# Patient Record
Sex: FEMALE | Race: WHITE | NOT HISPANIC OR LATINO | Employment: OTHER | URBAN - METROPOLITAN AREA
[De-identification: names, ages, dates, MRNs, and addresses within clinical notes are randomized per-mention and may not be internally consistent; named-entity substitution may affect disease eponyms.]

---

## 2017-03-01 ENCOUNTER — HOSPITAL ENCOUNTER (OUTPATIENT)
Dept: LAB | Facility: MEDICAL CENTER | Age: 32
End: 2017-03-01
Attending: OBSTETRICS & GYNECOLOGY
Payer: MEDICARE

## 2017-03-01 LAB — B-HCG SERPL-ACNC: 804.9 MIU/ML (ref 0–5)

## 2017-03-01 PROCEDURE — 36415 COLL VENOUS BLD VENIPUNCTURE: CPT | Mod: GA

## 2017-03-01 PROCEDURE — 84702 CHORIONIC GONADOTROPIN TEST: CPT | Mod: GA

## 2017-05-04 ENCOUNTER — OFFICE VISIT (OUTPATIENT)
Dept: URGENT CARE | Facility: CLINIC | Age: 32
End: 2017-05-04
Payer: MEDICARE

## 2017-05-04 VITALS
DIASTOLIC BLOOD PRESSURE: 72 MMHG | OXYGEN SATURATION: 98 % | HEIGHT: 65 IN | HEART RATE: 66 BPM | BODY MASS INDEX: 28.82 KG/M2 | RESPIRATION RATE: 16 BRPM | TEMPERATURE: 99.1 F | SYSTOLIC BLOOD PRESSURE: 114 MMHG | WEIGHT: 173 LBS

## 2017-05-04 DIAGNOSIS — Z3A.15 PREGNANCY WITH 15 COMPLETED WEEKS GESTATION: ICD-10-CM

## 2017-05-04 DIAGNOSIS — J45.41 MODERATE PERSISTENT ASTHMA WITH ACUTE EXACERBATION: ICD-10-CM

## 2017-05-04 DIAGNOSIS — J30.0 VASOMOTOR RHINITIS: ICD-10-CM

## 2017-05-04 DIAGNOSIS — J30.89 NON-SEASONAL ALLERGIC RHINITIS, UNSPECIFIED ALLERGIC RHINITIS TRIGGER: ICD-10-CM

## 2017-05-04 PROCEDURE — G8419 CALC BMI OUT NRM PARAM NOF/U: HCPCS | Performed by: EMERGENCY MEDICINE

## 2017-05-04 PROCEDURE — 1036F TOBACCO NON-USER: CPT | Performed by: EMERGENCY MEDICINE

## 2017-05-04 PROCEDURE — 99203 OFFICE O/P NEW LOW 30 MIN: CPT | Performed by: EMERGENCY MEDICINE

## 2017-05-04 ASSESSMENT — ENCOUNTER SYMPTOMS
SORE THROAT: 0
RHINORRHEA: 1
WHEEZING: 1
EYE REDNESS: 0
HEARTBURN: 0
VOMITING: 0
ABDOMINAL PAIN: 0
DIARRHEA: 0
SHORTNESS OF BREATH: 1
PALPITATIONS: 0
COUGH: 1
HEMOPTYSIS: 0
MYALGIAS: 0
SPUTUM PRODUCTION: 0
FEVER: 0
EYE DISCHARGE: 0
CHILLS: 0
HEADACHES: 0
NAUSEA: 0

## 2017-05-04 NOTE — MR AVS SNAPSHOT
"        Annamaria Medrano Ramoneinnaz   2017 4:30 PM   Office Visit   MRN: [id number]    Department:  [facility]   Dept Phone:  [phone]    Description:  Female : 1985   Provider:  Chi Palacios M.D.           Reason for Visit     Cough x 3 weeks having cough and chest congestion, nasal congestion and drainage, some ear popping, headache, wheezing, hx of asthma, 15 weeks pregnant       Allergies as of 2017     Allergen Noted Reactions    Imuran [Azathioprine Sodium] 2012   Anaphylaxis    Liver enzymes rise significantly    Sulfasalazine 2012   Hives    Plaquenil [Hydroxychloroquine Sulfate] 2012   Hives    Vicodin [Hydrocodone-Acetaminophen] 2012   Itching    Other Drug 2013   Hives    \"Iv contrast\"    Other Misc 10/05/2007       tape      You were diagnosed with     Moderate persistent asthma with acute exacerbation   [0114439]       Non-seasonal allergic rhinitis, unspecified allergic rhinitis trigger   [8906432]       Vasomotor rhinitis   [538116]       Pregnancy with 15 completed weeks gestation   [4374280]         Vital Signs     Blood Pressure Pulse Temperature Respirations Height Weight    114/72 mmHg 66 37.3 °C (99.1 °F) 16 1.651 m (5' 5\") 78.472 kg (173 lb)    Body Mass Index Oxygen Saturation Last Menstrual Period Breastfeeding? 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   2012 - Present    Dermatographic urticaria L50.3   2013 - Present    Migraine headache G43.909   2013 - Present    H/O thyroidectomy E89.0   2013 - Present    History of strabismus Z86.69   " 11/14/2013 - Present    S/P cholecystectomy Z90.49   11/14/2013 - Present    S/P inguinal hernia repair Z98.890, Z87.19   11/14/2013 - Present    Ankle fracture S82.899A   11/14/2013 - Present    History of back surgery Z98.890   11/14/2013 - Present    Mast cell disease Q82.2   11/19/2014 - Present    Mild intermittent asthma J45.20   9/28/2015 - Present    Postoperative hypothyroidism E89.0   1/15/2016 - Present    Folliculitis L73.9   1/15/2016 - Present    Candida vaginitis B37.3   1/15/2016 - Present    Acute on chronic SI joint pain M53.3, G89.29   2/3/2016 - Present    Lumbar radiculopathy M54.16   9/19/2016 - Present      Health Maintenance        Date Due Completion Dates    IMM PNEUMOCOCCAL 19-64 (ADULT) MEDIUM RISK SERIES (1 of 1 - PPSV23) 11/14/2016 9/19/2016    PAP SMEAR 12/17/2018 12/17/2015    IMM DTaP/Tdap/Td Vaccine (2 - Td) 9/19/2026 9/19/2016            Current Immunizations     13-VALENT PCV PREVNAR 9/19/2016 10:20 AM    Influenza Vaccine Quad Inj (Preserved) 1/15/2016    Tdap Vaccine 9/19/2016 10:20 AM      Below and/or attached are the medications your provider expects you to take. Review all of your home medications and newly ordered medications with your provider and/or pharmacist. Follow medication instructions as directed by your provider and/or pharmacist. Please keep your medication list with you and share with your provider. Update the information when medications are discontinued, doses are changed, or new medications (including over-the-counter products) are added; and carry medication information at all times in the event of emergency situations     Allergies:  IMURAN - Anaphylaxis     SULFASALAZINE - Hives     PLAQUENIL - Hives     VICODIN - Itching     OTHER DRUG - Hives     OTHER MISC - (reactions not documented)               Medications  Valid as of: May 04, 2017 -  5:41 PM    Generic Name Brand Name Tablet Size Instructions for use    Acyclovir (Ointment) ZOVIRAX 5 % APPLY TO  COLD SORE 4 TIMES DAILY        Albuterol Sulfate (Aero Soln) albuterol 108 (90 BASE) MCG/ACT Inhale 2 Puffs by mouth every four hours as needed for Shortness of Breath.        Albuterol Sulfate (Aero Soln) albuterol 108 (90 BASE) MCG/ACT Inhale 2 Puffs by mouth every 6 hours as needed for Shortness of Breath.        Budesonide (AEROSOL POWDER, BREATH ACTIVATED) Budesonide (Inhalation) 180 MCG/ACT Inhale 1 Puff by mouth 2 Times a Day.        Cetirizine HCl (Tab) ZYRTEC 10 MG TAKE 2 TABLETS BY MOUTH DAILY        Diclofenac Sodium (Gel) Diclofenac Sodium 1 % Apply 4 gm to effected areas qid prn.        Fluconazole (Tab) DIFLUCAN 100 MG Take 1 Tab by mouth every day. TAKE 1 TAB BY MOUTH EVERY DAY.        HydrOXYzine HCl (Tab) ATARAX 10 MG Take 1 Tab by mouth 3 times a day as needed for Itching.        Ibuprofen (Tab) MOTRIN 600 MG Take 1 Tab by mouth every 8 hours as needed for Moderate Pain or Inflammation.        Levothyroxine Sodium (Tab) SYNTHROID 112 MCG TAKE 2 TABLETS BY MOUTH EVERY MORNING ON AN EMPTY STOMACH.        Metaxalone (Tab) SKELAXIN 800 MG Take 1 Tab by mouth 3 times a day.        Montelukast Sodium (Tab) SINGULAIR 10 MG TAKE 1 TAB BY MOUTH EVERY DAY.        Ondansetron HCl (Tab) ZOFRAN 4 MG Take 1 Tab by mouth every 8 hours as needed for Nausea/Vomiting.        RaNITidine HCl (Tab) ZANTAC 150 MG Take 1 Tab by mouth 2 times a day.        SUMAtriptan Succinate   Take  by mouth.        .                 Medicines prescribed today were sent to:     [id number] - [place] - [address]    [address] [place] [address]    Phone: [phone] Fax: [phone]    Open 24 Hours?: No      Medication refill instructions:       If your prescription bottle indicates you have medication refills left, it is not necessary to call your provider’s office. Please contact your pharmacy and they will refill your medication.    If your prescription bottle indicates you do not have any refills left, you may  request refills at any time through one of the following ways: The online [employer] system (except Urgent Care), by calling your provider’s office, or by asking your pharmacy to contact your provider’s office with a refill request. Medication refills are processed only during regular business hours and may not be available until the next business day. Your provider may request additional information or to have a follow-up visit with you prior to refilling your medication.   *Please Note: Medication refills are assigned a new Rx number when refilled electronically. Your pharmacy may indicate that no refills were authorized even though a new prescription for the same medication is available at the pharmacy. Please request the medicine by name with the pharmacy before contacting your provider for a refill.        Instructions    Use saline nasal irrigation, such as with a Neti Pot, as needed daily for relief of nasal or sinus congestion relief.  Use over-the-counter inhaled nasal steroid (Rhinocort AQ) daily; continue for at least 2-3 weeks    Allergic Rhinitis  Allergic rhinitis is when the mucous membranes in the nose respond to allergens. Allergens are particles in the air that cause your body to have an allergic reaction. This causes you to release allergic antibodies. Through a chain of events, these eventually cause you to release histamine into the blood stream. Although meant to protect the body, it is this release of histamine that causes your discomfort, such as frequent sneezing, congestion, and an itchy, runny nose.   CAUSES  Seasonal allergic rhinitis (hay fever) is caused by pollen allergens that may come from grasses, trees, and weeds. Year-round allergic rhinitis (perennial allergic rhinitis) is caused by allergens such as house dust mites, pet dander, and mold spores.  SYMPTOMS  · Nasal stuffiness (congestion).  · Itchy, runny nose with sneezing and tearing of the eyes.  DIAGNOSIS  Your health care  provider can help you determine the allergen or allergens that trigger your symptoms. If you and your health care provider are unable to determine the allergen, skin or blood testing may be used. Your health care provider will diagnose your condition after taking your health history and performing a physical exam. Your health care provider may assess you for other related conditions, such as asthma, pink eye, or an ear infection.  TREATMENT  Allergic rhinitis does not have a cure, but it can be controlled by:  · Medicines that block allergy symptoms. These may include allergy shots, nasal sprays, and oral antihistamines.  · Avoiding the allergen.  Hay fever may often be treated with antihistamines in pill or nasal spray forms. Antihistamines block the effects of histamine. There are over-the-counter medicines that may help with nasal congestion and swelling around the eyes. Check with your health care provider before taking or giving this medicine.  If avoiding the allergen or the medicine prescribed do not work, there are many new medicines your health care provider can prescribe. Stronger medicine may be used if initial measures are ineffective. Desensitizing injections can be used if medicine and avoidance does not work. Desensitization is when a patient is given ongoing shots until the body becomes less sensitive to the allergen. Make sure you follow up with your health care provider if problems continue.  HOME CARE INSTRUCTIONS  It is not possible to completely avoid allergens, but you can reduce your symptoms by taking steps to limit your exposure to them. It helps to know exactly what you are allergic to so that you can avoid your specific triggers.  SEEK MEDICAL CARE IF:  · You have a fever.  · You develop a cough that does not stop easily (persistent).  · You have shortness of breath.  · You start wheezing.  · Symptoms interfere with normal daily activities.     This information is not intended to replace  advice given to you by your health care provider. Make sure you discuss any questions you have with your health care provider.     Document Released: 09/12/2002 Document Revised: 01/08/2016 Document Reviewed: 08/25/2014  [employer] Interactive Patient Education ©2016 [employer]  Bronchospasm, Adult  A bronchospasm is when the tubes that carry air in and out of your lungs (airways) spasm or tighten. During a bronchospasm it is hard to breathe. This is because the airways get smaller. A bronchospasm can be triggered by:  · Allergies. These may be to animals, pollen, food, or mold.  · Infection. This is a common cause of bronchospasm.  · Exercise.  · Irritants. These include pollution, cigarette smoke, strong odors, aerosol sprays, and paint fumes.  · Weather changes.  · Stress.  · Being emotional.  HOME CARE   · Always have a plan for getting help. Know when to call your doctor and local emergency services (911 in the [place] Know where you can get emergency care.  · Only take medicines as told by your doctor.  · If you were prescribed an inhaler or nebulizer machine, ask your doctor how to use it correctly. Always use a spacer with your inhaler if you were given one.  · Stay calm during an attack. Try to relax and breathe more slowly.  · Control your home environment:  · Change your heating and air conditioning filter at least once a month.  · Limit your use of fireplaces and wood stoves.  · Do not  smoke. Do not  allow smoking in your home.  · Avoid perfumes and fragrances.  · Get rid of pests (such as roaches and mice) and their droppings.  · Throw away plants if you see mold on them.  · Keep your house clean and dust free.  · Replace carpet with wood, tile, or vinyl shazia. Carpet can trap dander and dust.  · Use allergy-proof pillows, mattress covers, and box spring covers.  · Wash bed sheets and blankets every week in hot water. Dry them in a dryer.  · Use blankets that are made of polyester or cotton.  · Wash  hands frequently.  GET HELP IF:  · You have muscle aches.  · You have chest pain.  · The thick spit you spit or cough up (sputum) changes from clear or white to yellow, green, gray, or bloody.  · The thick spit you spit or cough up gets thicker.  · There are problems that may be related to the medicine you are given such as:  · A rash.  · Itching.  · Swelling.  · Trouble breathing.  GET HELP RIGHT AWAY IF:  · You feel you cannot breathe or catch your breath.  · You cannot stop coughing.  · Your treatment is not helping you breathe better.  · You have very bad chest pain.  MAKE SURE YOU:   · Understand these instructions.  · Will watch your condition.  · Will get help right away if you are not doing well or get worse.     This information is not intended to replace advice given to you by your health care provider. Make sure you discuss any questions you have with your health care provider.     Document Released: 10/15/2010 Document Revised: 01/08/2016 Document Reviewed: 06/10/2014  [employer] Interactive Patient Education ©2016 [employer]  .       Other Notes About Your Plan     Dr Champagne for Allergy  Dr Titus-STEVIE Jarrett-rheumatology           MyChart Access Code: Activation code not generated  Current [employer] Status: Active

## 2017-05-05 NOTE — PROGRESS NOTES
Subjective:      Annamaria Babin is a 32 y.o. female who presents with Cough            Cough  This is a new problem. Episode onset: 3 weeks. The problem has been waxing and waning. The problem occurs every few minutes. The cough is non-productive. Associated symptoms include chest pain, nasal congestion, postnasal drip, rhinorrhea, shortness of breath and wheezing. Pertinent negatives include no chills, ear congestion, ear pain, eye redness, fever, headaches, heartburn, hemoptysis, myalgias or sore throat. She has tried a beta-agonist inhaler for the symptoms. The treatment provided mild relief. Her past medical history is significant for asthma and environmental allergies.     3 and Ab2, 15 weeks on, located pregnancy. Patient notes prior vasomotor issues associated with pregnancy. Onset of illness associated with coryzal symptoms and fever, fever resolved but cough and congestion persist. Obstetrician suggested riding it out.    Review of Systems   Constitutional: Positive for malaise/fatigue. Negative for fever and chills.   HENT: Positive for congestion, postnasal drip and rhinorrhea. Negative for ear discharge, ear pain, hearing loss, nosebleeds and sore throat.    Eyes: Negative for discharge and redness.   Respiratory: Positive for cough, shortness of breath and wheezing. Negative for hemoptysis and sputum production.    Cardiovascular: Positive for chest pain. Negative for palpitations and leg swelling.        Diffuse chest pain associated with coughing only   Gastrointestinal: Negative for heartburn, nausea, vomiting, abdominal pain and diarrhea.   Genitourinary:        No vaginal discharge or bleeding.   Musculoskeletal: Negative for myalgias.   Neurological: Negative for headaches.   Endo/Heme/Allergies: Positive for environmental allergies.     PMH:  has a past medical history of Hashimoto disease; Anemia; Renal disorder; and Liver disease.  MEDS:   Current outpatient prescriptions:   •   Budesonide, Inhalation, 180 MCG/ACT AEROSOL POWDER, BREATH ACTIVATED, Inhale 1 Puff by mouth 2 Times a Day., Disp: 1 Each, Rfl: 0  •  levothyroxine (SYNTHROID) 112 MCG Tab, TAKE 2 TABLETS BY MOUTH EVERY MORNING ON AN EMPTY STOMACH., Disp: 180 Tab, Rfl: 3  •  albuterol 108 (90 BASE) MCG/ACT Aero Soln inhalation aerosol, Inhale 2 Puffs by mouth every 6 hours as needed for Shortness of Breath., Disp: 8.5 g, Rfl: 3  •  montelukast (SINGULAIR) 10 MG Tab, TAKE 1 TAB BY MOUTH EVERY DAY., Disp: 60 Tab, Rfl: 6  •  cetirizine (ZYRTEC) 10 MG Tab, TAKE 2 TABLETS BY MOUTH DAILY, Disp: 90 Tab, Rfl: 3  •  acyclovir (ZOVIRAX) 5 % Ointment, APPLY TO COLD SORE 4 TIMES DAILY, Disp: 30 g, Rfl: 0  •  ranitidine (ZANTAC) 150 MG Tab, Take 1 Tab by mouth 2 times a day., Disp: 60 Tab, Rfl: 11  •  albuterol (VENTOLIN OR PROVENTIL) 108 (90 BASE) MCG/ACT Aero Soln inhalation aerosol, Inhale 2 Puffs by mouth every four hours as needed for Shortness of Breath., Disp: 1 Inhaler, Rfl: 3  •  ondansetron (ZOFRAN) 4 MG Tab tablet, Take 1 Tab by mouth every 8 hours as needed for Nausea/Vomiting., Disp: 20 Tab, Rfl: 1  •  SUMAtriptan Succinate (IMITREX PO), Take  by mouth., Disp: , Rfl:   •  ibuprofen (MOTRIN) 600 MG Tab, Take 1 Tab by mouth every 8 hours as needed for Moderate Pain or Inflammation., Disp: 30 Tab, Rfl: 3  •  metaxalone (SKELAXIN) 800 MG Tab, Take 1 Tab by mouth 3 times a day., Disp: 90 Tab, Rfl: 3  •  hydrOXYzine (ATARAX) 10 MG Tab, Take 1 Tab by mouth 3 times a day as needed for Itching., Disp: 90 Tab, Rfl: 1  •  fluconazole (DIFLUCAN) 100 MG Tab, Take 1 Tab by mouth every day. TAKE 1 TAB BY MOUTH EVERY DAY., Disp: 5 Tab, Rfl: 1  •  Diclofenac Sodium (VOLTAREN) 1 % GEL, Apply 4 gm to effected areas qid prn., Disp: 3 Tube, Rfl: 3  ALLERGIES:   Allergies   Allergen Reactions   • Imuran [Azathioprine Sodium] Anaphylaxis     Liver enzymes rise significantly   • Sulfasalazine Hives   • Plaquenil [Hydroxychloroquine Sulfate] Hives   •  "Vicodin [Hydrocodone-Acetaminophen] Itching   • Other Drug Hives     \"Iv contrast\"   • Other Misc      tape     SURGHX:   Past Surgical History   Procedure Laterality Date   • Thyroidectomy     • Tonsillectomy     • Strabismus repair     • Ankle orif     • Inguinal hernia repair     • Other abdominal surgery       gall bladder     SOCHX:  reports that she has never smoked. She has never used smokeless tobacco. She reports that she does not drink alcohol or use illicit drugs.  FH: family history includes Diabetes in her father and mother; GI in her mother; Heart Disease in her father; Heart Failure in her father; Hypertension in her father and mother.       Objective:     /72 mmHg  Pulse 66  Temp(Src) 37.3 °C (99.1 °F)  Resp 16  Ht 1.651 m (5' 5\")  Wt 78.472 kg (173 lb)  BMI 28.79 kg/m2  SpO2 98%  LMP 02/01/2017  Breastfeeding? No     Physical Exam   Constitutional: She is oriented to person, place, and time. She appears well-developed and well-nourished. She is cooperative. She does not have a sickly appearance. She does not appear ill. No distress.   HENT:   Head: Normocephalic.   Right Ear: Ear canal normal. Tympanic membrane is not erythematous.   Left Ear: Ear canal normal. Tympanic membrane is not erythematous.   Nose: Mucosal edema and rhinorrhea present. Right sinus exhibits maxillary sinus tenderness. Left sinus exhibits maxillary sinus tenderness.   Mouth/Throat: Oropharyngeal exudate present. No posterior oropharyngeal edema or posterior oropharyngeal erythema.   Eyes: Conjunctivae are normal.   Neck: Trachea normal and phonation normal. Neck supple. No JVD present.   Cardiovascular: Normal rate, regular rhythm and normal heart sounds.    No significant pedal edema.   Pulmonary/Chest: No accessory muscle usage. No tachypnea. No respiratory distress. She has no decreased breath sounds. She has wheezes. She has rhonchi. She has no rales.   Rhonchi clear with cough.   Abdominal: She exhibits no " distension.   Lymphadenopathy:     She has no cervical adenopathy.   Neurological: She is alert and oriented to person, place, and time.   Skin: Skin is warm and dry.   Psychiatric: She has a normal mood and affect.          Risk of persistent asthma symptoms appears greater than the risk of having inhaled steroids during second trimester pregnancy     Assessment/Plan:     1. Moderate persistent asthma with acute exacerbation  Advised used own metered-dose inhaler with spacer, own nebulizer with albuterol as needed.  Advised F/U PCP as soon as available.  - Budesonide, Inhalation, 180 MCG/ACT AEROSOL POWDER, BREATH ACTIVATED; Inhale 1 Puff by mouth 2 Times a Day.  Dispense: 1 Each; Refill: 0  2. Non-seasonal allergic rhinitis, unspecified allergic rhinitis trigger  Recommended nasal saline irrigation daily, OTC inhaled Rhinocort nasal steroid daily, discontinue OTC nonsedating antihistamine.  3. Vasomotor rhinitis    4. Pregnancy with 15 completed weeks gestation

## 2017-05-05 NOTE — PATIENT INSTRUCTIONS
Use saline nasal irrigation, such as with a Neti Pot, as needed daily for relief of nasal or sinus congestion relief.  Use over-the-counter inhaled nasal steroid (Rhinocort AQ) daily; continue for at least 2-3 weeks    Allergic Rhinitis  Allergic rhinitis is when the mucous membranes in the nose respond to allergens. Allergens are particles in the air that cause your body to have an allergic reaction. This causes you to release allergic antibodies. Through a chain of events, these eventually cause you to release histamine into the blood stream. Although meant to protect the body, it is this release of histamine that causes your discomfort, such as frequent sneezing, congestion, and an itchy, runny nose.   CAUSES  Seasonal allergic rhinitis (hay fever) is caused by pollen allergens that may come from grasses, trees, and weeds. Year-round allergic rhinitis (perennial allergic rhinitis) is caused by allergens such as house dust mites, pet dander, and mold spores.  SYMPTOMS  · Nasal stuffiness (congestion).  · Itchy, runny nose with sneezing and tearing of the eyes.  DIAGNOSIS  Your health care provider can help you determine the allergen or allergens that trigger your symptoms. If you and your health care provider are unable to determine the allergen, skin or blood testing may be used. Your health care provider will diagnose your condition after taking your health history and performing a physical exam. Your health care provider may assess you for other related conditions, such as asthma, pink eye, or an ear infection.  TREATMENT  Allergic rhinitis does not have a cure, but it can be controlled by:  · Medicines that block allergy symptoms. These may include allergy shots, nasal sprays, and oral antihistamines.  · Avoiding the allergen.  Hay fever may often be treated with antihistamines in pill or nasal spray forms. Antihistamines block the effects of histamine. There are over-the-counter medicines that may help with  nasal congestion and swelling around the eyes. Check with your health care provider before taking or giving this medicine.  If avoiding the allergen or the medicine prescribed do not work, there are many new medicines your health care provider can prescribe. Stronger medicine may be used if initial measures are ineffective. Desensitizing injections can be used if medicine and avoidance does not work. Desensitization is when a patient is given ongoing shots until the body becomes less sensitive to the allergen. Make sure you follow up with your health care provider if problems continue.  HOME CARE INSTRUCTIONS  It is not possible to completely avoid allergens, but you can reduce your symptoms by taking steps to limit your exposure to them. It helps to know exactly what you are allergic to so that you can avoid your specific triggers.  SEEK MEDICAL CARE IF:  · You have a fever.  · You develop a cough that does not stop easily (persistent).  · You have shortness of breath.  · You start wheezing.  · Symptoms interfere with normal daily activities.     This information is not intended to replace advice given to you by your health care provider. Make sure you discuss any questions you have with your health care provider.     Document Released: 09/12/2002 Document Revised: 01/08/2016 Document Reviewed: 08/25/2014  [employer] Interactive Patient Education ©2016 [employer]  Bronchospasm, Adult  A bronchospasm is when the tubes that carry air in and out of your lungs (airways) spasm or tighten. During a bronchospasm it is hard to breathe. This is because the airways get smaller. A bronchospasm can be triggered by:  · Allergies. These may be to animals, pollen, food, or mold.  · Infection. This is a common cause of bronchospasm.  · Exercise.  · Irritants. These include pollution, cigarette smoke, strong odors, aerosol sprays, and paint fumes.  · Weather changes.  · Stress.  · Being emotional.  HOME CARE   · Always have a plan  for getting help. Know when to call your doctor and local emergency services (911 in the [place] Know where you can get emergency care.  · Only take medicines as told by your doctor.  · If you were prescribed an inhaler or nebulizer machine, ask your doctor how to use it correctly. Always use a spacer with your inhaler if you were given one.  · Stay calm during an attack. Try to relax and breathe more slowly.  · Control your home environment:  · Change your heating and air conditioning filter at least once a month.  · Limit your use of fireplaces and wood stoves.  · Do not  smoke. Do not  allow smoking in your home.  · Avoid perfumes and fragrances.  · Get rid of pests (such as roaches and mice) and their droppings.  · Throw away plants if you see mold on them.  · Keep your house clean and dust free.  · Replace carpet with wood, tile, or vinyl shazia. Carpet can trap dander and dust.  · Use allergy-proof pillows, mattress covers, and box spring covers.  · Wash bed sheets and blankets every week in hot water. Dry them in a dryer.  · Use blankets that are made of polyester or cotton.  · Wash hands frequently.  GET HELP IF:  · You have muscle aches.  · You have chest pain.  · The thick spit you spit or cough up (sputum) changes from clear or white to yellow, green, gray, or bloody.  · The thick spit you spit or cough up gets thicker.  · There are problems that may be related to the medicine you are given such as:  · A rash.  · Itching.  · Swelling.  · Trouble breathing.  GET HELP RIGHT AWAY IF:  · You feel you cannot breathe or catch your breath.  · You cannot stop coughing.  · Your treatment is not helping you breathe better.  · You have very bad chest pain.  MAKE SURE YOU:   · Understand these instructions.  · Will watch your condition.  · Will get help right away if you are not doing well or get worse.     This information is not intended to replace advice given to you by your health care provider. Make sure you  discuss any questions you have with your health care provider.     Document Released: 10/15/2010 Document Revised: 01/08/2016 Document Reviewed: 06/10/2014  [employer] Interactive Patient Education ©2016 [employer]  .

## 2017-05-08 ENCOUNTER — HOSPITAL ENCOUNTER (OUTPATIENT)
Dept: LAB | Facility: MEDICAL CENTER | Age: 32
End: 2017-05-08
Attending: OBSTETRICS & GYNECOLOGY
Payer: MEDICARE

## 2017-05-08 ENCOUNTER — HOSPITAL ENCOUNTER (OUTPATIENT)
Facility: MEDICAL CENTER | Age: 32
End: 2017-05-08
Attending: OBSTETRICS & GYNECOLOGY
Payer: MEDICARE

## 2017-05-08 LAB — AMBIGUOUS DTTM AMBI4: NORMAL

## 2017-05-08 PROCEDURE — 81507 FETAL ANEUPLOIDY TRISOM RISK: CPT

## 2017-05-12 ENCOUNTER — HOSPITAL ENCOUNTER (OUTPATIENT)
Dept: LAB | Facility: MEDICAL CENTER | Age: 32
End: 2017-05-12
Attending: OBSTETRICS & GYNECOLOGY
Payer: MEDICARE

## 2017-05-12 LAB
ALBUMIN SERPL BCP-MCNC: 4.2 G/DL (ref 3.2–4.9)
ALBUMIN/GLOB SERPL: 1.3 G/DL
ALP SERPL-CCNC: 82 U/L (ref 30–99)
ALT SERPL-CCNC: 10 U/L (ref 2–50)
ANION GAP SERPL CALC-SCNC: 9 MMOL/L (ref 0–11.9)
AST SERPL-CCNC: 17 U/L (ref 12–45)
BILIRUB SERPL-MCNC: 0.6 MG/DL (ref 0.1–1.5)
BUN SERPL-MCNC: 10 MG/DL (ref 8–22)
CALCIUM SERPL-MCNC: 9.2 MG/DL (ref 8.5–10.5)
CHLORIDE SERPL-SCNC: 105 MMOL/L (ref 96–112)
CO2 SERPL-SCNC: 23 MMOL/L (ref 20–33)
COLLECT DURATION TIME UR: 24 HR
CREAT CL/1.73 SQ M ?TM UR+SERPL-ARVRAT: 209 ML/MIN (ref 88–128)
CREAT SERPL-MCNC: 0.61 MG/DL (ref 0.5–1.4)
CREAT SERPL-MCNC: 0.63 MG/DL (ref 0.5–1.4)
CREAT UR-MCNC: 65.3 MG/DL
GFR SERPL CREATININE-BSD FRML MDRD: >60 ML/MIN/1.73 M 2
GLOBULIN SER CALC-MCNC: 3.2 G/DL (ref 1.9–3.5)
GLUCOSE SERPL-MCNC: 77 MG/DL (ref 65–99)
POTASSIUM SERPL-SCNC: 4.1 MMOL/L (ref 3.6–5.5)
PROT 24H UR-MCNC: NORMAL MG/24 HR (ref 30–150)
PROT 24H UR-MRATE: <4 MG/DL (ref 0–15)
PROT SERPL-MCNC: 7.4 G/DL (ref 6–8.2)
SODIUM SERPL-SCNC: 137 MMOL/L (ref 135–145)
SPECIMEN VOL UR: 3000 ML
SPECIMEN VOL UR: 3000 ML
T4 FREE SERPL-MCNC: 0.76 NG/DL (ref 0.53–1.43)
TSH SERPL DL<=0.005 MIU/L-ACNC: 4.99 UIU/ML (ref 0.3–3.7)
URINE CREATININE EXCRETED 1125: 1959 MG/24 HR

## 2017-05-12 PROCEDURE — 80053 COMPREHEN METABOLIC PANEL: CPT

## 2017-05-12 PROCEDURE — 36415 COLL VENOUS BLD VENIPUNCTURE: CPT

## 2017-05-12 PROCEDURE — 84439 ASSAY OF FREE THYROXINE: CPT

## 2017-05-12 PROCEDURE — 81050 URINALYSIS VOLUME MEASURE: CPT

## 2017-05-12 PROCEDURE — 84443 ASSAY THYROID STIM HORMONE: CPT

## 2017-05-12 PROCEDURE — 84156 ASSAY OF PROTEIN URINE: CPT

## 2017-05-12 PROCEDURE — 82575 CREATININE CLEARANCE TEST: CPT

## 2017-05-15 LAB
ANNOTATION COMMENT IMP: NORMAL
FET CHR X + Y ANEUP RISK PLAS.CFDNA QN: NORMAL
FET SEX US: NORMAL
FET TS 13 RISK PLAS.CFDNA QL: NORMAL
FET TS 18 RISK PLAS.CFDNA QL: NORMAL
FET TS 21 RISK PLAS.CFDNA QL: NORMAL
FETAL FRACTION Q0204: 8.9
GA (DAYS): 5 D
GA (WEEKS): 12 WK
PATHOLOGY STUDY: NORMAL
SERVICE CMNT-IMP: YES
TRIPLOIDY VAN TWIN Q0202: NORMAL

## 2017-05-26 DIAGNOSIS — D47.09 MAST CELL DISEASE: ICD-10-CM

## 2017-05-26 DIAGNOSIS — L50.3 DERMATOGRAPHIC URTICARIA: ICD-10-CM

## 2017-05-26 DIAGNOSIS — J45.20 MILD INTERMITTENT ASTHMA WITHOUT COMPLICATION: ICD-10-CM

## 2017-05-26 RX ORDER — CETIRIZINE HYDROCHLORIDE 10 MG/1
TABLET ORAL
Qty: 90 TAB | Refills: 12 | Status: SHIPPED | OUTPATIENT
Start: 2017-05-26 | End: 2018-10-01 | Stop reason: SDUPTHER

## 2017-05-26 RX ORDER — RANITIDINE 150 MG/1
TABLET ORAL
Qty: 60 TAB | Refills: 12 | Status: SHIPPED | OUTPATIENT
Start: 2017-05-26 | End: 2018-10-01 | Stop reason: SDUPTHER

## 2017-05-26 RX ORDER — MONTELUKAST SODIUM 10 MG/1
TABLET ORAL
Qty: 60 TAB | Refills: 12 | Status: SHIPPED | OUTPATIENT
Start: 2017-05-26 | End: 2018-10-01 | Stop reason: SDUPTHER

## 2017-06-19 DIAGNOSIS — D47.09 MAST CELL DISEASE: ICD-10-CM

## 2017-06-19 RX ORDER — CROMOLYN SODIUM 100 MG/5ML
200 SOLUTION, CONCENTRATE ORAL
Qty: 10 ML | Refills: 0 | OUTPATIENT
Start: 2017-06-19

## 2017-06-19 NOTE — TELEPHONE ENCOUNTER
1. Caller Name: Annamaria Babin                                         Call Back Number: [phone] (home)       Patient approves a detailed voicemail message: N\A    Patient informed.

## 2017-06-19 NOTE — TELEPHONE ENCOUNTER
Was the patient seen in the last year in this department? No lv 04/19/16    Does patient have an active prescription for medications requested? No     Received Request Via: Patient

## 2017-06-19 NOTE — TELEPHONE ENCOUNTER
Refill one time. Patient received Cromolyn from Allergy specialist. Please ask patient and pharmacy to send refill request to allergy specialist.    Margret Titus M.D.

## 2017-06-26 ENCOUNTER — HOSPITAL ENCOUNTER (OUTPATIENT)
Dept: LAB | Facility: MEDICAL CENTER | Age: 32
End: 2017-06-26
Attending: OBSTETRICS & GYNECOLOGY
Payer: MEDICARE

## 2017-06-26 LAB
T4 FREE SERPL-MCNC: 0.68 NG/DL (ref 0.53–1.43)
TSH SERPL DL<=0.005 MIU/L-ACNC: 5.35 UIU/ML (ref 0.3–3.7)

## 2017-06-26 PROCEDURE — 82105 ALPHA-FETOPROTEIN SERUM: CPT

## 2017-06-26 PROCEDURE — 36415 COLL VENOUS BLD VENIPUNCTURE: CPT

## 2017-06-26 PROCEDURE — 84439 ASSAY OF FREE THYROXINE: CPT

## 2017-06-26 PROCEDURE — 84443 ASSAY THYROID STIM HORMONE: CPT

## 2017-07-03 LAB
# FETUSES US: NORMAL
AFP MOM SERPL: 0.98
AFP SERPL-MCNC: 54 NG/ML
AGE - REPORTED: 33.7 YR
GA METHOD: NORMAL
GA: 20.71 WEEKS
IDDM PATIENT QL: NO
INTEGRATED SCN PATIENT-IMP: NORMAL

## 2017-08-01 ENCOUNTER — HOSPITAL ENCOUNTER (OUTPATIENT)
Facility: MEDICAL CENTER | Age: 32
End: 2017-08-01
Attending: CLINICAL NURSE SPECIALIST
Payer: MEDICARE

## 2017-08-01 PROCEDURE — 87591 N.GONORRHOEAE DNA AMP PROB: CPT

## 2017-08-01 PROCEDURE — 87491 CHLMYD TRACH DNA AMP PROBE: CPT

## 2017-08-02 LAB — AMBIGUOUS DTTM AMBI4: NORMAL

## 2017-08-03 ENCOUNTER — HOSPITAL ENCOUNTER (OUTPATIENT)
Dept: LAB | Facility: MEDICAL CENTER | Age: 32
End: 2017-08-03
Attending: CLINICAL NURSE SPECIALIST
Payer: MEDICARE

## 2017-08-03 LAB
C TRACH DNA SPEC QL NAA+PROBE: NEGATIVE
ERYTHROCYTE [DISTWIDTH] IN BLOOD BY AUTOMATED COUNT: 49.4 FL (ref 35.9–50)
GLUCOSE 1H P 50 G GLC PO SERPL-MCNC: 115 MG/DL (ref 70–139)
HBV SURFACE AG SER QL: NEGATIVE
HCT VFR BLD AUTO: 44.1 % (ref 37–47)
HCV AB SER QL: NEGATIVE
HGB BLD-MCNC: 14.4 G/DL (ref 12–16)
HIV 1+2 AB+HIV1 P24 AG SERPL QL IA: NON REACTIVE
MCH RBC QN AUTO: 32.9 PG (ref 27–33)
MCHC RBC AUTO-ENTMCNC: 32.7 G/DL (ref 33.6–35)
MCV RBC AUTO: 100.7 FL (ref 81.4–97.8)
N GONORRHOEA DNA SPEC QL NAA+PROBE: NEGATIVE
PLATELET # BLD AUTO: 179 K/UL (ref 164–446)
PMV BLD AUTO: 11.7 FL (ref 9–12.9)
RBC # BLD AUTO: 4.38 M/UL (ref 4.2–5.4)
SPEC CONTAINER SPEC: NORMAL
SPECIMEN SOURCE: NORMAL
TREPONEMA PALLIDUM IGG+IGM AB [PRESENCE] IN SERUM OR PLASMA BY IMMUNOASSAY: NON REACTIVE
WBC # BLD AUTO: 10.1 K/UL (ref 4.8–10.8)

## 2017-08-03 PROCEDURE — 86780 TREPONEMA PALLIDUM: CPT

## 2017-08-03 PROCEDURE — 85027 COMPLETE CBC AUTOMATED: CPT

## 2017-08-03 PROCEDURE — 86803 HEPATITIS C AB TEST: CPT

## 2017-08-03 PROCEDURE — 87340 HEPATITIS B SURFACE AG IA: CPT

## 2017-08-03 PROCEDURE — 36415 COLL VENOUS BLD VENIPUNCTURE: CPT

## 2017-08-03 PROCEDURE — 87389 HIV-1 AG W/HIV-1&-2 AB AG IA: CPT

## 2017-08-03 PROCEDURE — 82950 GLUCOSE TEST: CPT

## 2017-08-21 ENCOUNTER — HOSPITAL ENCOUNTER (OUTPATIENT)
Facility: MEDICAL CENTER | Age: 32
End: 2017-08-21
Attending: OBSTETRICS & GYNECOLOGY | Admitting: OBSTETRICS & GYNECOLOGY
Payer: MEDICARE

## 2017-08-21 VITALS
TEMPERATURE: 97.8 F | HEART RATE: 69 BPM | SYSTOLIC BLOOD PRESSURE: 116 MMHG | RESPIRATION RATE: 18 BRPM | HEIGHT: 65 IN | BODY MASS INDEX: 33.32 KG/M2 | WEIGHT: 200 LBS | DIASTOLIC BLOOD PRESSURE: 70 MMHG

## 2017-08-21 LAB
A1 MICROGLOB PLACENTAL VAG QL: NEGATIVE
FIBRONECTIN FETAL SPEC QL: POSITIVE

## 2017-08-21 PROCEDURE — 59025 FETAL NON-STRESS TEST: CPT | Performed by: OBSTETRICS & GYNECOLOGY

## 2017-08-21 PROCEDURE — 84112 EVAL AMNIOTIC FLUID PROTEIN: CPT

## 2017-08-21 PROCEDURE — 82731 ASSAY OF FETAL FIBRONECTIN: CPT

## 2017-08-21 ASSESSMENT — PAIN SCALES - GENERAL
PAINLEVEL_OUTOF10: 0
PAINLEVEL_OUTOF10: 0

## 2017-08-22 NOTE — CONSULTS
DATE OF SERVICE:  2017    HISTORY OF PRESENT ILLNESS:  This is a 32-year-old  3, para 2 who is at   28 weeks and 5 days.  She reports loss of fluid at approximately 1900 with   small amount of dribble.  Following that, she denies any contractions or   vaginal bleeding.  She reports that the baby had decreased movement this   morning, but this has subsequently resolved.  Fetal surveillance shows   reactive tracing for gestational age.  Her Tocometer shows no contractions.    On sterile speculum exam, there was no fluid noted.  The AmniSure was   negative.  Fetal fibronectin was collected was positive; however, on review,   the patient has had intercourse within the last 24 hours.  Based on overall   clinical exam, patient does not appear to be ruptured and has reassuring fetal   surveillance for gestational age.  She will be discharged home with   precautions and will follow up with Dr. Mora as previously scheduled.       ____________________________________     MD CHASITY SINGLETON / MAGEN    DD:  2017 23:11:38  DT:  2017 00:11:53    D#:  [id number]  Job#:  [id number]

## 2017-08-22 NOTE — PROGRESS NOTES
"- 32 yr old, , EDC , per LMP, 28.5wks.  Pt arrived to L&D for LOF at 1900.  Pt states, \"I was standing outside and I have fluid leak down my legs.\"  \"I laid down for a while and then when I got up I had a couple more dripples.\"  Pt denies VB or UC's.  Pt reports decreased FM, starting this morning. Pt currently reports +FM, with visual movement and palpable movement of baby in abdomen.  [place] and US on, VS/S.  - Dr. Vigil updated on pt's status. FFN and amnisure obtained by sterile speculum exam, no fluid present upon exam.  - Amnisure results-neg. FFN results +, pt questioned about sexual activity and pt did have sex this morning.  Dr. Vigil updated on test results.  -  Working at the bedside, all questions answered. Pt to follow-up with Dr. Mora at scheduled appt. Pt discharged home with written instructions.  - pt ambulated out of hospital with FOB.  "

## 2017-09-19 ENCOUNTER — HOSPITAL ENCOUNTER (OUTPATIENT)
Facility: MEDICAL CENTER | Age: 32
End: 2017-09-19
Attending: OBSTETRICS & GYNECOLOGY | Admitting: OBSTETRICS & GYNECOLOGY
Payer: MEDICARE

## 2017-09-19 VITALS — TEMPERATURE: 97.2 F

## 2017-09-19 LAB
APPEARANCE UR: ABNORMAL
COLOR UR AUTO: YELLOW
GLUCOSE UR QL STRIP.AUTO: NEGATIVE MG/DL
KETONES UR QL STRIP.AUTO: NEGATIVE MG/DL
LEUKOCYTE ESTERASE UR QL STRIP.AUTO: NEGATIVE
NITRITE UR QL STRIP.AUTO: NEGATIVE
PH UR STRIP.AUTO: 5.5 [PH]
PROT UR QL STRIP: NEGATIVE MG/DL
RBC UR QL AUTO: ABNORMAL
SP GR UR: 1.01

## 2017-09-19 PROCEDURE — 59025 FETAL NON-STRESS TEST: CPT | Performed by: OBSTETRICS & GYNECOLOGY

## 2017-09-19 PROCEDURE — 81002 URINALYSIS NONAUTO W/O SCOPE: CPT

## 2017-09-20 PROCEDURE — 81050 URINALYSIS VOLUME MEASURE: CPT

## 2017-09-20 PROCEDURE — 84156 ASSAY OF PROTEIN URINE: CPT

## 2017-09-20 PROCEDURE — 36415 COLL VENOUS BLD VENIPUNCTURE: CPT

## 2017-09-20 PROCEDURE — 82575 CREATININE CLEARANCE TEST: CPT

## 2017-09-20 NOTE — PROGRESS NOTES
with EDC of  making her 32.6 presents for irregular UCs since last noc; denies VB or LOF; reports good FM. Pt had intercourse yesterday. Urine dipped, WNL. Pt reports no UCs while she's been here. Report given to Dr. Arita, orders to check cervix and d/c home. SVE closed. PTL precautions provided; educated about the importance of doing kick counts

## 2017-09-21 ENCOUNTER — HOSPITAL ENCOUNTER (OUTPATIENT)
Dept: LAB | Facility: MEDICAL CENTER | Age: 32
End: 2017-09-21
Attending: OBSTETRICS & GYNECOLOGY
Payer: MEDICARE

## 2017-09-21 LAB
ALBUMIN SERPL BCP-MCNC: 3.4 G/DL (ref 3.2–4.9)
ALBUMIN/GLOB SERPL: 1.1 G/DL
ALP SERPL-CCNC: 154 U/L (ref 30–99)
ALT SERPL-CCNC: 18 U/L (ref 2–50)
ANION GAP SERPL CALC-SCNC: 8 MMOL/L (ref 0–11.9)
AST SERPL-CCNC: 18 U/L (ref 12–45)
BASOPHILS # BLD AUTO: 0.5 % (ref 0–1.8)
BASOPHILS # BLD: 0.05 K/UL (ref 0–0.12)
BILIRUB SERPL-MCNC: 0.4 MG/DL (ref 0.1–1.5)
BUN SERPL-MCNC: 10 MG/DL (ref 8–22)
CALCIUM SERPL-MCNC: 8.3 MG/DL (ref 8.5–10.5)
CHLORIDE SERPL-SCNC: 105 MMOL/L (ref 96–112)
CO2 SERPL-SCNC: 23 MMOL/L (ref 20–33)
CREAT SERPL-MCNC: 0.46 MG/DL (ref 0.5–1.4)
EOSINOPHIL # BLD AUTO: 0.13 K/UL (ref 0–0.51)
EOSINOPHIL NFR BLD: 1.2 % (ref 0–6.9)
ERYTHROCYTE [DISTWIDTH] IN BLOOD BY AUTOMATED COUNT: 44.9 FL (ref 35.9–50)
GFR SERPL CREATININE-BSD FRML MDRD: >60 ML/MIN/1.73 M 2
GLOBULIN SER CALC-MCNC: 3.2 G/DL (ref 1.9–3.5)
GLUCOSE SERPL-MCNC: 100 MG/DL (ref 65–99)
HCT VFR BLD AUTO: 40.7 % (ref 37–47)
HGB BLD-MCNC: 13.8 G/DL (ref 12–16)
IMM GRANULOCYTES # BLD AUTO: 0.19 K/UL (ref 0–0.11)
IMM GRANULOCYTES NFR BLD AUTO: 1.8 % (ref 0–0.9)
LYMPHOCYTES # BLD AUTO: 1.53 K/UL (ref 1–4.8)
LYMPHOCYTES NFR BLD: 14.7 % (ref 22–41)
MCH RBC QN AUTO: 32.4 PG (ref 27–33)
MCHC RBC AUTO-ENTMCNC: 33.9 G/DL (ref 33.6–35)
MCV RBC AUTO: 95.5 FL (ref 81.4–97.8)
MONOCYTES # BLD AUTO: 0.75 K/UL (ref 0–0.85)
MONOCYTES NFR BLD AUTO: 7.2 % (ref 0–13.4)
NEUTROPHILS # BLD AUTO: 7.78 K/UL (ref 2–7.15)
NEUTROPHILS NFR BLD: 74.6 % (ref 44–72)
NRBC # BLD AUTO: 0 K/UL
NRBC BLD AUTO-RTO: 0 /100 WBC
PLATELET # BLD AUTO: 189 K/UL (ref 164–446)
PMV BLD AUTO: 11.5 FL (ref 9–12.9)
POTASSIUM SERPL-SCNC: 3.9 MMOL/L (ref 3.6–5.5)
PROT 24H UR-MCNC: 169 MG/24 HR (ref 30–150)
PROT 24H UR-MRATE: 6.2 MG/DL (ref 0–15)
PROT SERPL-MCNC: 6.6 G/DL (ref 6–8.2)
RBC # BLD AUTO: 4.26 M/UL (ref 4.2–5.4)
SODIUM SERPL-SCNC: 136 MMOL/L (ref 135–145)
SPECIMEN VOL UR: 2725 ML
T4 FREE SERPL-MCNC: 0.57 NG/DL (ref 0.53–1.43)
TSH SERPL DL<=0.005 MIU/L-ACNC: 6.59 UIU/ML (ref 0.3–3.7)
WBC # BLD AUTO: 10.4 K/UL (ref 4.8–10.8)

## 2017-09-21 PROCEDURE — 36415 COLL VENOUS BLD VENIPUNCTURE: CPT

## 2017-09-21 PROCEDURE — 80053 COMPREHEN METABOLIC PANEL: CPT

## 2017-09-21 PROCEDURE — 84443 ASSAY THYROID STIM HORMONE: CPT

## 2017-09-21 PROCEDURE — 85025 COMPLETE CBC W/AUTO DIFF WBC: CPT

## 2017-09-21 PROCEDURE — 84439 ASSAY OF FREE THYROXINE: CPT

## 2017-09-21 PROCEDURE — 84156 ASSAY OF PROTEIN URINE: CPT

## 2017-09-21 PROCEDURE — 82575 CREATININE CLEARANCE TEST: CPT

## 2017-09-21 PROCEDURE — 81050 URINALYSIS VOLUME MEASURE: CPT

## 2017-09-22 LAB
COLLECT DURATION TIME UR: 24 HR
CREAT CL/1.73 SQ M ?TM UR+SERPL-ARVRAT: 259 ML/MIN (ref 88–128)
CREAT SERPL-MCNC: 0.52 MG/DL (ref 0.5–1.4)
CREAT UR-MCNC: 80.7 MG/DL
SPECIMEN VOL UR: 2725 ML
URINE CREATININE EXCRETED 1125: 2199 MG/24 HR

## 2017-09-28 ENCOUNTER — APPOINTMENT (OUTPATIENT)
Dept: RADIOLOGY | Facility: MEDICAL CENTER | Age: 32
End: 2017-09-28
Attending: OBSTETRICS & GYNECOLOGY
Payer: MEDICARE

## 2017-09-28 ENCOUNTER — HOSPITAL ENCOUNTER (OUTPATIENT)
Facility: MEDICAL CENTER | Age: 32
End: 2017-09-28
Attending: OBSTETRICS & GYNECOLOGY | Admitting: OBSTETRICS & GYNECOLOGY
Payer: MEDICARE

## 2017-09-28 VITALS
HEART RATE: 82 BPM | BODY MASS INDEX: 36.49 KG/M2 | RESPIRATION RATE: 18 BRPM | DIASTOLIC BLOOD PRESSURE: 76 MMHG | SYSTOLIC BLOOD PRESSURE: 128 MMHG | HEIGHT: 65 IN | WEIGHT: 219 LBS | TEMPERATURE: 97.8 F

## 2017-09-28 LAB
ANION GAP SERPL CALC-SCNC: 12 MMOL/L (ref 0–11.9)
APPEARANCE UR: CLEAR
BILIRUB UR QL STRIP.AUTO: NEGATIVE
BUN SERPL-MCNC: 8 MG/DL (ref 8–22)
CALCIUM SERPL-MCNC: 8.3 MG/DL (ref 8.5–10.5)
CHLORIDE SERPL-SCNC: 105 MMOL/L (ref 96–112)
CO2 SERPL-SCNC: 19 MMOL/L (ref 20–33)
COLOR UR: YELLOW
CREAT SERPL-MCNC: 0.51 MG/DL (ref 0.5–1.4)
GFR SERPL CREATININE-BSD FRML MDRD: >60 ML/MIN/1.73 M 2
GLUCOSE SERPL-MCNC: 102 MG/DL (ref 65–99)
GLUCOSE UR STRIP.AUTO-MCNC: NEGATIVE MG/DL
KETONES UR STRIP.AUTO-MCNC: NEGATIVE MG/DL
LEUKOCYTE ESTERASE UR QL STRIP.AUTO: NEGATIVE
MICRO URNS: NORMAL
NITRITE UR QL STRIP.AUTO: NEGATIVE
PH UR STRIP.AUTO: 5 [PH]
POTASSIUM SERPL-SCNC: 3.5 MMOL/L (ref 3.6–5.5)
PROT UR QL STRIP: NEGATIVE MG/DL
RBC UR QL AUTO: NEGATIVE
SODIUM SERPL-SCNC: 136 MMOL/L (ref 135–145)
SP GR UR STRIP.AUTO: 1.01
UROBILINOGEN UR STRIP.AUTO-MCNC: 0.2 MG/DL

## 2017-09-28 PROCEDURE — 80048 BASIC METABOLIC PNL TOTAL CA: CPT

## 2017-09-28 PROCEDURE — 700111 HCHG RX REV CODE 636 W/ 250 OVERRIDE (IP): Performed by: OBSTETRICS & GYNECOLOGY

## 2017-09-28 PROCEDURE — 76775 US EXAM ABDO BACK WALL LIM: CPT

## 2017-09-28 PROCEDURE — 81003 URINALYSIS AUTO W/O SCOPE: CPT

## 2017-09-28 PROCEDURE — 96374 THER/PROPH/DIAG INJ IV PUSH: CPT

## 2017-09-28 PROCEDURE — 59025 FETAL NON-STRESS TEST: CPT | Performed by: OBSTETRICS & GYNECOLOGY

## 2017-09-28 PROCEDURE — 36415 COLL VENOUS BLD VENIPUNCTURE: CPT

## 2017-09-28 RX ORDER — BETAMETHASONE SODIUM PHOSPHATE AND BETAMETHASONE ACETATE 3; 3 MG/ML; MG/ML
12 INJECTION, SUSPENSION INTRA-ARTICULAR; INTRALESIONAL; INTRAMUSCULAR; SOFT TISSUE EVERY 24 HOURS
Status: DISCONTINUED | OUTPATIENT
Start: 2017-09-28 | End: 2017-09-28 | Stop reason: HOSPADM

## 2017-09-28 RX ADMIN — BETAMETHASONE SODIUM PHOSPHATE AND BETAMETHASONE ACETATE 12 MG: 3; 3 INJECTION, SUSPENSION INTRA-ARTICULAR; INTRALESIONAL; INTRAMUSCULAR at 04:37

## 2017-09-28 NOTE — PROGRESS NOTES
G  P  EDC of 10-30 35.3 weeks according to DR Patricia or 11-8 34.1 weeks according to Dr Tan pt is here with C/O constant pain on her right kidney. Dr Way was notified orders received 0220 pt is now straining her urine. US is here for renal US. BMP was drawn.  0300 it was noted that pt is having UCs Q 6 min SVE done 3-5 min 10% very posterior and very high. Can not  feel presenting part.  0400 pt is still feeling pain on her right side, just irritability noted. Labs ae WNL.  Renal US is all normal. Dr Way was called report given and order received for betamethasone.  betamethasone first dose given at 0437.  0545 DR Way in spoke to pt, physical exam done and SVE done extrnal oz 3-4 inner oz 2 cm, long and high.  0600 order received to discharge pt home . Pt does not need to come back for second dose.  0605 instructions given and pt was discharged home with  at 0610.

## 2017-09-28 NOTE — CONSULTS
DATE OF SERVICE:  2017    CHIEF COMPLAINT:  Right lower posterior chest wall pain at 35 weeks'   gestation.    HISTORY OF PRESENT ILLNESS:  The patient is a 32-year-old lady,  3,   para 2 with 2 previous uncomplicated term deliveries.  She states that she   sees Dr. Alesia Krishnamurthy for prenatal care.  We do not have her prenatal   records.  We do have some prenatal records from Dr. Mora's office from   earlier in the pregnancy and the patient states that she switched doctors   after that.  It sounds like her STACEY was changed to 10/30/2017 by first   trimester ultrasound at the perinatologist's office, which would make her EGA   35-3/7 weeks.    The patient came here today because of gradually worsening pain in the area of   her right lower posterior ribs for 3 days, which eventually became   unbearable.  She has been on labor and delivery for several hours now and   states that the pain is greatly improved.  She denies having any shortness of   breath or cough.  She denies any trauma or unusual activities.    The patient states that she has a diagnosis of Alport's syndrome, a disorder   of the glomerular basement membrane.  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  There is an extensive family history of renal failure   secondary to this condition in multiple males in her family.    Fetal monitoring is reassuring, there have been sporadic contractions, but no   regular uterine activity.  Before I arrived, there was a question of advanced   cervical dilatation, so I did order a dose of betamethasone;  she did receive   12 mg betamethasone as ordered before IU arrived..  I just checked her cervix myself, it is very high, very   posterior, very difficult to reach the internal os, but I think her internal   os is approximately 2 cm dilated.  I am fairly certain the baby presents   cephalically, the head  "is ballotable.  In view of this, I do not think it is   necessary for her to return tomorrow for second betamethasone injection.    PAST MEDICAL HISTORY:  Positive for \"Alport's syndrome\" diagnosed with renal   biopsy several years ago.  She also describes history of mastocytosis with   splenomegaly, Hashimoto's thyroiditis with goiter, eventually resulting in   thyroidectomy.    PAST SURGICAL HISTORY:  Extensive including total thyroidectomy,   tonsillectomy, hernia repair, cholecystectomy, ankle and back surgery.    MEDICATIONS:  Prenatal vitamin daily.    ALLERGIES:  She has to avoid many medicines because of her mastocytosis   including OPIOIDS, SULFA, AND TAPE ADHESIVE.    FAMILY HISTORY:  She states that her father, paternal grandfather and brother   are also  affected by Alport's syndrome, highly suspicious for the   X-linked variant.  Her father  from both renal failure and deafness.    PHYSICAL EXAMINATION:  VITAL SIGNS:  Afebrile, /76.  GENERAL:  At this time, she is in minimal distress.  She states that the right   posterior lower chest wall pain has markedly improved without any specific   treatment.  The intercostal muscles in this area are mildly tender.  There is   no CVA tenderness.  Uterus is soft and nontender.  PELVIC:  Above.  Cervix very posterior high, uneffaced, internal os   approximately 2 cm dilated.  Baby's head is ballotable.  EXTREMITIES:  No edema.  Homans negative.  DTRs normal.    LABORATORY DATA:  Hemoglobin 1 week ago was 13.8, hematocrit 40.7, white blood   count 10,400, platelet count 189,000.  Today, her BUN is 8, creatinine 0.5,   potassium 3.5, sodium 136.  Urinalysis is completely unremarkable.  There is   no proteinuria, negative for nitrites and leukocyte esterase, negative for   occult blood.  Renal ultrasound is within normal limits.  Kidneys are normal   size.  There is no evidence of hydronephrosis or ureteral obstruction on   either side.    DIAGNOSES:  1.  " 35-3/7 week gestation.  2.  Musculoskeletal pain involving the intercostal muscles, spontaneously   improved without therapy.  3.  No evidence of ureteral obstruction or nephroureterolithiasis.  4.  Reported history of Alport's syndrome, presently normal renal function   with no proteinuria whatsoever.    RECOMMENDATIONS:  She will go home, she will keep her appointment with Dr. Gumaro Ruff next week, I advised Tylenol as needed for pain, heating   pad as needed.  I do not think it is necessary to return for the second   betamethasone injection tomorrow.       ____________________________________     MD REBEKAH DUNHAM / MAGEN    DD:  09/28/2017 06:17:03  DT:  09/28/2017 06:52:53    D#:  [id number]  Job#:  [id number]    cc: GUMARO RUFF MD

## 2017-11-05 ENCOUNTER — HOSPITAL ENCOUNTER (INPATIENT)
Facility: MEDICAL CENTER | Age: 32
LOS: 2 days | End: 2017-11-07
Attending: OBSTETRICS & GYNECOLOGY | Admitting: OBSTETRICS & GYNECOLOGY
Payer: MEDICARE

## 2017-11-05 LAB
BASOPHILS # BLD AUTO: 0.4 % (ref 0–1.8)
BASOPHILS # BLD: 0.04 K/UL (ref 0–0.12)
EOSINOPHIL # BLD AUTO: 0.1 K/UL (ref 0–0.51)
EOSINOPHIL NFR BLD: 0.9 % (ref 0–6.9)
ERYTHROCYTE [DISTWIDTH] IN BLOOD BY AUTOMATED COUNT: 45.1 FL (ref 35.9–50)
HCT VFR BLD AUTO: 42.1 % (ref 37–47)
HGB BLD-MCNC: 14.4 G/DL (ref 12–16)
HOLDING TUBE BB 8507: NORMAL
IMM GRANULOCYTES # BLD AUTO: 0.19 K/UL (ref 0–0.11)
IMM GRANULOCYTES NFR BLD AUTO: 1.8 % (ref 0–0.9)
LYMPHOCYTES # BLD AUTO: 1.93 K/UL (ref 1–4.8)
LYMPHOCYTES NFR BLD: 18.3 % (ref 22–41)
MCH RBC QN AUTO: 31.8 PG (ref 27–33)
MCHC RBC AUTO-ENTMCNC: 34.2 G/DL (ref 33.6–35)
MCV RBC AUTO: 92.9 FL (ref 81.4–97.8)
MONOCYTES # BLD AUTO: 0.82 K/UL (ref 0–0.85)
MONOCYTES NFR BLD AUTO: 7.8 % (ref 0–13.4)
NEUTROPHILS # BLD AUTO: 7.47 K/UL (ref 2–7.15)
NEUTROPHILS NFR BLD: 70.8 % (ref 44–72)
NRBC # BLD AUTO: 0 K/UL
NRBC BLD AUTO-RTO: 0 /100 WBC
PLATELET # BLD AUTO: 183 K/UL (ref 164–446)
PMV BLD AUTO: 11.3 FL (ref 9–12.9)
RBC # BLD AUTO: 4.53 M/UL (ref 4.2–5.4)
WBC # BLD AUTO: 10.6 K/UL (ref 4.8–10.8)

## 2017-11-05 PROCEDURE — 700111 HCHG RX REV CODE 636 W/ 250 OVERRIDE (IP)

## 2017-11-05 PROCEDURE — 700105 HCHG RX REV CODE 258: Performed by: OBSTETRICS & GYNECOLOGY

## 2017-11-05 PROCEDURE — 4A1HX4Z MONITORING OF PRODUCTS OF CONCEPTION, CARDIAC ELECTRICAL ACTIVITY, EXTERNAL APPROACH: ICD-10-PCS | Performed by: OBSTETRICS & GYNECOLOGY

## 2017-11-05 PROCEDURE — 700111 HCHG RX REV CODE 636 W/ 250 OVERRIDE (IP): Performed by: OBSTETRICS & GYNECOLOGY

## 2017-11-05 PROCEDURE — 85025 COMPLETE CBC W/AUTO DIFF WBC: CPT

## 2017-11-05 PROCEDURE — 3E033VJ INTRODUCTION OF OTHER HORMONE INTO PERIPHERAL VEIN, PERCUTANEOUS APPROACH: ICD-10-PCS | Performed by: OBSTETRICS & GYNECOLOGY

## 2017-11-05 PROCEDURE — 4A1H74Z MONITORING OF PRODUCTS OF CONCEPTION, CARDIAC ELECTRICAL ACTIVITY, VIA NATURAL OR ARTIFICIAL OPENING: ICD-10-PCS | Performed by: OBSTETRICS & GYNECOLOGY

## 2017-11-05 PROCEDURE — 700105 HCHG RX REV CODE 258

## 2017-11-05 PROCEDURE — 10907ZC DRAINAGE OF AMNIOTIC FLUID, THERAPEUTIC FROM PRODUCTS OF CONCEPTION, VIA NATURAL OR ARTIFICIAL OPENING: ICD-10-PCS | Performed by: OBSTETRICS & GYNECOLOGY

## 2017-11-05 PROCEDURE — 10H07YZ INSERTION OF OTHER DEVICE INTO PRODUCTS OF CONCEPTION, VIA NATURAL OR ARTIFICIAL OPENING: ICD-10-PCS | Performed by: OBSTETRICS & GYNECOLOGY

## 2017-11-05 PROCEDURE — 36415 COLL VENOUS BLD VENIPUNCTURE: CPT

## 2017-11-05 PROCEDURE — 770002 HCHG ROOM/CARE - OB PRIVATE (112)

## 2017-11-05 RX ORDER — TERBUTALINE SULFATE 1 MG/ML
0.25 INJECTION, SOLUTION SUBCUTANEOUS PRN
Status: DISCONTINUED | OUTPATIENT
Start: 2017-11-05 | End: 2017-11-06 | Stop reason: HOSPADM

## 2017-11-05 RX ORDER — METHYLERGONOVINE MALEATE 0.2 MG/ML
0.2 INJECTION INTRAVENOUS
Status: COMPLETED | OUTPATIENT
Start: 2017-11-05 | End: 2017-11-06

## 2017-11-05 RX ORDER — SODIUM CHLORIDE 9 MG/ML
INJECTION, SOLUTION INTRAVENOUS
Status: ACTIVE
Start: 2017-11-05 | End: 2017-11-06

## 2017-11-05 RX ORDER — SODIUM CHLORIDE 9 MG/ML
INJECTION, SOLUTION INTRAVENOUS
Status: COMPLETED
Start: 2017-11-05 | End: 2017-11-06

## 2017-11-05 RX ORDER — AMPICILLIN 1 G/1
1 INJECTION, POWDER, FOR SOLUTION INTRAMUSCULAR; INTRAVENOUS EVERY 4 HOURS
Status: DISCONTINUED | OUTPATIENT
Start: 2017-11-05 | End: 2017-11-06

## 2017-11-05 RX ORDER — CARBOPROST TROMETHAMINE 250 UG/ML
250 INJECTION, SOLUTION INTRAMUSCULAR
Status: DISCONTINUED | OUTPATIENT
Start: 2017-11-05 | End: 2017-11-06 | Stop reason: HOSPADM

## 2017-11-05 RX ORDER — MISOPROSTOL 200 UG/1
1000 TABLET ORAL
Status: COMPLETED | OUTPATIENT
Start: 2017-11-05 | End: 2017-11-06

## 2017-11-05 RX ORDER — OXYTOCIN 10 [USP'U]/ML
10 INJECTION, SOLUTION INTRAMUSCULAR; INTRAVENOUS
Status: DISCONTINUED | OUTPATIENT
Start: 2017-11-05 | End: 2017-11-06 | Stop reason: HOSPADM

## 2017-11-05 RX ORDER — HYDROXYZINE 50 MG/1
50 TABLET, FILM COATED ORAL EVERY 6 HOURS PRN
Status: DISCONTINUED | OUTPATIENT
Start: 2017-11-05 | End: 2017-11-06 | Stop reason: HOSPADM

## 2017-11-05 RX ORDER — AMPICILLIN 2 G/1
2 INJECTION, POWDER, FOR SOLUTION INTRAVENOUS ONCE
Status: COMPLETED | OUTPATIENT
Start: 2017-11-05 | End: 2017-11-05

## 2017-11-05 RX ORDER — SODIUM CHLORIDE, SODIUM LACTATE, POTASSIUM CHLORIDE, CALCIUM CHLORIDE 600; 310; 30; 20 MG/100ML; MG/100ML; MG/100ML; MG/100ML
INJECTION, SOLUTION INTRAVENOUS CONTINUOUS
Status: DISPENSED | OUTPATIENT
Start: 2017-11-05 | End: 2017-11-05

## 2017-11-05 RX ORDER — ALUMINA, MAGNESIA, AND SIMETHICONE 2400; 2400; 240 MG/30ML; MG/30ML; MG/30ML
30 SUSPENSION ORAL EVERY 6 HOURS PRN
Status: DISCONTINUED | OUTPATIENT
Start: 2017-11-05 | End: 2017-11-06 | Stop reason: HOSPADM

## 2017-11-05 RX ORDER — SODIUM CHLORIDE, SODIUM LACTATE, POTASSIUM CHLORIDE, CALCIUM CHLORIDE 600; 310; 30; 20 MG/100ML; MG/100ML; MG/100ML; MG/100ML
INJECTION, SOLUTION INTRAVENOUS
Status: COMPLETED
Start: 2017-11-05 | End: 2017-11-05

## 2017-11-05 RX ORDER — SODIUM CHLORIDE 9 MG/ML
INJECTION, SOLUTION INTRAVENOUS
Status: COMPLETED
Start: 2017-11-05 | End: 2017-11-05

## 2017-11-05 RX ADMIN — SODIUM CHLORIDE, POTASSIUM CHLORIDE, SODIUM LACTATE AND CALCIUM CHLORIDE: 600; 310; 30; 20 INJECTION, SOLUTION INTRAVENOUS at 21:36

## 2017-11-05 RX ADMIN — SODIUM CHLORIDE 100 ML: 9 INJECTION, SOLUTION INTRAVENOUS at 21:36

## 2017-11-05 RX ADMIN — SODIUM CHLORIDE, POTASSIUM CHLORIDE, SODIUM LACTATE AND CALCIUM CHLORIDE 1000 ML: 600; 310; 30; 20 INJECTION, SOLUTION INTRAVENOUS at 12:15

## 2017-11-05 RX ADMIN — AMPICILLIN SODIUM 1 G: 1 INJECTION, POWDER, FOR SOLUTION INTRAMUSCULAR; INTRAVENOUS at 21:36

## 2017-11-05 RX ADMIN — AMPICILLIN SODIUM 2 G: 2 INJECTION, POWDER, FOR SOLUTION INTRAMUSCULAR; INTRAVENOUS at 13:15

## 2017-11-05 RX ADMIN — SODIUM CHLORIDE: 900 INJECTION INTRAVENOUS at 13:15

## 2017-11-05 RX ADMIN — AMPICILLIN SODIUM 1 G: 1 INJECTION, POWDER, FOR SOLUTION INTRAMUSCULAR; INTRAVENOUS at 17:00

## 2017-11-05 RX ADMIN — OXYTOCIN 1 MILLI-UNITS/MIN: 10 INJECTION, SOLUTION INTRAMUSCULAR; INTRAVENOUS at 12:31

## 2017-11-05 ASSESSMENT — PATIENT HEALTH QUESTIONNAIRE - PHQ9
1. LITTLE INTEREST OR PLEASURE IN DOING THINGS: NOT AT ALL
SUM OF ALL RESPONSES TO PHQ9 QUESTIONS 1 AND 2: 0
SUM OF ALL RESPONSES TO PHQ QUESTIONS 1-9: 0
2. FEELING DOWN, DEPRESSED, IRRITABLE, OR HOPELESS: NOT AT ALL

## 2017-11-05 ASSESSMENT — LIFESTYLE VARIABLES
DO YOU DRINK ALCOHOL: NO
EVER_SMOKED: NEVER
ALCOHOL_USE: NO

## 2017-11-05 ASSESSMENT — COPD QUESTIONNAIRES
DO YOU EVER COUGH UP ANY MUCUS OR PHLEGM?: NO/ONLY WITH OCCASIONAL COLDS OR INFECTIONS
DURING THE PAST 4 WEEKS HOW MUCH DID YOU FEEL SHORT OF BREATH: NONE/LITTLE OF THE TIME
HAVE YOU SMOKED AT LEAST 100 CIGARETTES IN YOUR ENTIRE LIFE: NO/DON'T KNOW
COPD SCREENING SCORE: 0

## 2017-11-05 NOTE — CARE PLAN
Problem: Pain  Goal: Alleviation of Pain or a reduction in pain to the patient's comfort goal  Outcome: PROGRESSING AS EXPECTED  Pt educated on pain intervention options and comfort measures  Pt desires natural labor

## 2017-11-05 NOTE — CARE PLAN
Problem: Risk for Infection, Impaired Wound Healing  Goal: Remain free from signs and symptoms of infection  Outcome: PROGRESSING AS EXPECTED  Pt and FOB educated on infection prevention and hand hygiene

## 2017-11-06 PROCEDURE — 0HQ9XZZ REPAIR PERINEUM SKIN, EXTERNAL APPROACH: ICD-10-PCS | Performed by: OBSTETRICS & GYNECOLOGY

## 2017-11-06 PROCEDURE — A9270 NON-COVERED ITEM OR SERVICE: HCPCS | Performed by: OBSTETRICS & GYNECOLOGY

## 2017-11-06 PROCEDURE — 304965 HCHG RECOVERY SERVICES

## 2017-11-06 PROCEDURE — 303615 HCHG EPIDURAL/SPINAL ANESTHESIA FOR LABOR

## 2017-11-06 PROCEDURE — 770002 HCHG ROOM/CARE - OB PRIVATE (112)

## 2017-11-06 PROCEDURE — 700102 HCHG RX REV CODE 250 W/ 637 OVERRIDE(OP): Performed by: OBSTETRICS & GYNECOLOGY

## 2017-11-06 PROCEDURE — 700111 HCHG RX REV CODE 636 W/ 250 OVERRIDE (IP)

## 2017-11-06 PROCEDURE — 88307 TISSUE EXAM BY PATHOLOGIST: CPT

## 2017-11-06 PROCEDURE — 700111 HCHG RX REV CODE 636 W/ 250 OVERRIDE (IP): Performed by: OBSTETRICS & GYNECOLOGY

## 2017-11-06 PROCEDURE — 36415 COLL VENOUS BLD VENIPUNCTURE: CPT

## 2017-11-06 PROCEDURE — 59409 OBSTETRICAL CARE: CPT

## 2017-11-06 PROCEDURE — 700105 HCHG RX REV CODE 258

## 2017-11-06 RX ORDER — OXYCODONE HYDROCHLORIDE AND ACETAMINOPHEN 5; 325 MG/1; MG/1
1 TABLET ORAL EVERY 4 HOURS PRN
Status: DISCONTINUED | OUTPATIENT
Start: 2017-11-06 | End: 2017-11-07 | Stop reason: HOSPADM

## 2017-11-06 RX ORDER — DOCUSATE SODIUM 100 MG/1
100 CAPSULE, LIQUID FILLED ORAL 2 TIMES DAILY PRN
Status: DISCONTINUED | OUTPATIENT
Start: 2017-11-06 | End: 2017-11-07 | Stop reason: HOSPADM

## 2017-11-06 RX ORDER — ACETAMINOPHEN 325 MG/1
325 TABLET ORAL EVERY 4 HOURS PRN
Status: DISCONTINUED | OUTPATIENT
Start: 2017-11-06 | End: 2017-11-07 | Stop reason: HOSPADM

## 2017-11-06 RX ORDER — LEVOTHYROXINE SODIUM 112 UG/1
224 TABLET ORAL
Status: DISCONTINUED | OUTPATIENT
Start: 2017-11-07 | End: 2017-11-07 | Stop reason: HOSPADM

## 2017-11-06 RX ORDER — ALBUTEROL SULFATE 90 UG/1
2 AEROSOL, METERED RESPIRATORY (INHALATION) EVERY 6 HOURS PRN
Status: DISCONTINUED | OUTPATIENT
Start: 2017-11-06 | End: 2017-11-07 | Stop reason: HOSPADM

## 2017-11-06 RX ORDER — SODIUM CHLORIDE, SODIUM LACTATE, POTASSIUM CHLORIDE, CALCIUM CHLORIDE 600; 310; 30; 20 MG/100ML; MG/100ML; MG/100ML; MG/100ML
INJECTION, SOLUTION INTRAVENOUS PRN
Status: DISCONTINUED | OUTPATIENT
Start: 2017-11-06 | End: 2017-11-07 | Stop reason: HOSPADM

## 2017-11-06 RX ORDER — CARBOPROST TROMETHAMINE 250 UG/ML
250 INJECTION, SOLUTION INTRAMUSCULAR
Status: DISCONTINUED | OUTPATIENT
Start: 2017-11-06 | End: 2017-11-06

## 2017-11-06 RX ORDER — IBUPROFEN 600 MG/1
600 TABLET ORAL EVERY 6 HOURS PRN
Status: DISCONTINUED | OUTPATIENT
Start: 2017-11-06 | End: 2017-11-07 | Stop reason: HOSPADM

## 2017-11-06 RX ORDER — ONDANSETRON 2 MG/ML
4 INJECTION INTRAMUSCULAR; INTRAVENOUS EVERY 6 HOURS PRN
Status: DISCONTINUED | OUTPATIENT
Start: 2017-11-06 | End: 2017-11-07 | Stop reason: HOSPADM

## 2017-11-06 RX ORDER — ROPIVACAINE HYDROCHLORIDE 2 MG/ML
INJECTION, SOLUTION EPIDURAL; INFILTRATION; PERINEURAL
Status: COMPLETED
Start: 2017-11-06 | End: 2017-11-06

## 2017-11-06 RX ORDER — VITAMIN A ACETATE, BETA CAROTENE, ASCORBIC ACID, CHOLECALCIFEROL, .ALPHA.-TOCOPHEROL ACETATE, DL-, THIAMINE MONONITRATE, RIBOFLAVIN, NIACINAMIDE, PYRIDOXINE HYDROCHLORIDE, FOLIC ACID, CYANOCOBALAMIN, CALCIUM CARBONATE, FERROUS FUMARATE, ZINC OXIDE, CUPRIC OXIDE 3080; 12; 120; 400; 1; 1.84; 3; 20; 22; 920; 25; 200; 27; 10; 2 [IU]/1; UG/1; MG/1; [IU]/1; MG/1; MG/1; MG/1; MG/1; MG/1; [IU]/1; MG/1; MG/1; MG/1; MG/1; MG/1
1 TABLET, FILM COATED ORAL EVERY MORNING
Status: DISCONTINUED | OUTPATIENT
Start: 2017-11-06 | End: 2017-11-07 | Stop reason: HOSPADM

## 2017-11-06 RX ORDER — OXYCODONE AND ACETAMINOPHEN 10; 325 MG/1; MG/1
1 TABLET ORAL EVERY 4 HOURS PRN
Status: DISCONTINUED | OUTPATIENT
Start: 2017-11-06 | End: 2017-11-07 | Stop reason: HOSPADM

## 2017-11-06 RX ORDER — LEVOTHYROXINE SODIUM 112 UG/1
112 TABLET ORAL 2 TIMES DAILY
Status: DISCONTINUED | OUTPATIENT
Start: 2017-11-06 | End: 2017-11-06

## 2017-11-06 RX ORDER — ONDANSETRON 4 MG/1
4 TABLET, ORALLY DISINTEGRATING ORAL EVERY 6 HOURS PRN
Status: DISCONTINUED | OUTPATIENT
Start: 2017-11-06 | End: 2017-11-07 | Stop reason: HOSPADM

## 2017-11-06 RX ORDER — MISOPROSTOL 200 UG/1
600 TABLET ORAL
Status: DISCONTINUED | OUTPATIENT
Start: 2017-11-06 | End: 2017-11-07 | Stop reason: HOSPADM

## 2017-11-06 RX ORDER — METHYLERGONOVINE MALEATE 0.2 MG/ML
0.2 INJECTION INTRAVENOUS
Status: DISCONTINUED | OUTPATIENT
Start: 2017-11-06 | End: 2017-11-07 | Stop reason: HOSPADM

## 2017-11-06 RX ORDER — SODIUM CHLORIDE, SODIUM LACTATE, POTASSIUM CHLORIDE, CALCIUM CHLORIDE 600; 310; 30; 20 MG/100ML; MG/100ML; MG/100ML; MG/100ML
INJECTION, SOLUTION INTRAVENOUS
Status: COMPLETED
Start: 2017-11-06 | End: 2017-11-06

## 2017-11-06 RX ADMIN — MISOPROSTOL 1000 MCG: 200 TABLET ORAL at 04:01

## 2017-11-06 RX ADMIN — OXYTOCIN: 10 INJECTION, SOLUTION INTRAMUSCULAR; INTRAVENOUS at 05:00

## 2017-11-06 RX ADMIN — SODIUM CHLORIDE, POTASSIUM CHLORIDE, SODIUM LACTATE AND CALCIUM CHLORIDE 1000 ML: 600; 310; 30; 20 INJECTION, SOLUTION INTRAVENOUS at 01:00

## 2017-11-06 RX ADMIN — LEVOTHYROXINE SODIUM 112 MCG: 112 TABLET ORAL at 07:16

## 2017-11-06 RX ADMIN — Medication 1 TABLET: at 20:47

## 2017-11-06 RX ADMIN — METHYLERGONOVINE MALEATE 0.2 MG: 0.2 INJECTION, SOLUTION INTRAMUSCULAR; INTRAVENOUS at 07:03

## 2017-11-06 RX ADMIN — OXYTOCIN 2000 ML/HR: 10 INJECTION, SOLUTION INTRAMUSCULAR; INTRAVENOUS at 03:51

## 2017-11-06 RX ADMIN — IBUPROFEN 600 MG: 600 TABLET, FILM COATED ORAL at 12:51

## 2017-11-06 RX ADMIN — IBUPROFEN 600 MG: 600 TABLET, FILM COATED ORAL at 06:12

## 2017-11-06 RX ADMIN — IBUPROFEN 600 MG: 600 TABLET, FILM COATED ORAL at 20:46

## 2017-11-06 RX ADMIN — ROPIVACAINE HYDROCHLORIDE 100 ML: 2 INJECTION, SOLUTION EPIDURAL; INFILTRATION at 00:59

## 2017-11-06 RX ADMIN — ACETAMINOPHEN 325 MG: 325 TABLET, FILM COATED ORAL at 07:17

## 2017-11-06 ASSESSMENT — PATIENT HEALTH QUESTIONNAIRE - PHQ9
1. LITTLE INTEREST OR PLEASURE IN DOING THINGS: NOT AT ALL
SUM OF ALL RESPONSES TO PHQ9 QUESTIONS 1 AND 2: 0
2. FEELING DOWN, DEPRESSED, IRRITABLE, OR HOPELESS: NOT AT ALL
SUM OF ALL RESPONSES TO PHQ QUESTIONS 1-9: 0
SUM OF ALL RESPONSES TO PHQ QUESTIONS 1-9: 0
SUM OF ALL RESPONSES TO PHQ9 QUESTIONS 1 AND 2: 0
1. LITTLE INTEREST OR PLEASURE IN DOING THINGS: NOT AT ALL
2. FEELING DOWN, DEPRESSED, IRRITABLE, OR HOPELESS: NOT AT ALL

## 2017-11-06 ASSESSMENT — PAIN SCALES - GENERAL
PAINLEVEL_OUTOF10: 0
PAINLEVEL_OUTOF10: 5
PAINLEVEL_OUTOF10: 4
PAINLEVEL_OUTOF10: 5
PAINLEVEL_OUTOF10: 3
PAINLEVEL_OUTOF10: 3
PAINLEVEL_OUTOF10: 4
PAINLEVEL_OUTOF10: 5

## 2017-11-06 ASSESSMENT — LIFESTYLE VARIABLES
DO YOU DRINK ALCOHOL: NO
DO YOU DRINK ALCOHOL: NO

## 2017-11-06 NOTE — PROGRESS NOTES
1733 Report received from KAY Paul RN  0820 Assessment complete WDL, pain management plan discussed. Pt states she will call for pain medication as needed. POC discussed all questions answered at this time. Pt oriented to skylight. Bands matched with infant MOB and FOB. Oriented to call light and emergency cord. Education completed at this time, answered all questions at this time. Pt verbalizes understanding.  1040 Report given to Maria E VACA

## 2017-11-06 NOTE — DELIVERY NOTE
DATE OF SERVICE:  2017    Stage I labor:  The patient was admitted as a 32-year-old  3, para   2-0-0-2 at 39+4 weeks' gestation for induction of labor secondary to nephrotic   syndrome and thin basement membrane kidney disease with worsening blood   pressures.  The patient was started on Pitocin per protocol IV.  The patient   was noted to be GBS positive and was started on ampicillin IV per protocol.    Patient underwent artificial rupture of membranes of clear fluid at 1825 after   2 doses of IV antibiotics and an IUPC was placed at that time.  The patient   was noted to have baseline in the 120s with accels to the 140s and variable   decelerations as well as occasional late decelerations.  The patient had   periods of category 2 tracing and as such, a fetal scalp electrode was placed.    The patient had been 7 cm for sometime and received an epidural for labor   analgesia and then progressed to complete at 0325 on 2017.    Stage II labor, 25 minutes:  I was present for normal spontaneous vaginal   delivery of a viable male infant at 0350.  The vertex of the infant delivered   without difficulty in the direct OP position.  No nuchal cord was palpated and   no shoulder dystocia was encountered.  The remainder of the infant delivered   without difficulty.  Terminal meconium was noted.  The infant's mouth and nose   were bulb suctioned.  The infant was placed on the mother's abdomen.  Delayed   cord clamping was performed.  The cord was then clamped and cut.  A segment   of cord was clamped and cut for cord gas and these results are pending.  The   Apgar scores were 8 at 1 minute and 9 at 5 minutes.    The infant's weight is pending.    The patient received IV Pitocin after delivery of the infant.    Stage III labor, 10 minutes:  The placenta delivered intact with 3-vessel cord   at 0400 hours.  The patient's perineum was examined and she was found to have   sustained multiple first-degree perineal  lacerations, which were repaired in   the usual fashion with 3-0 Vicryl on an SH needle.  Bimanual uterine massage   was performed and small clots removed from the lower uterine segment.  The   patient received 1000 mcg of Cytotec per rectum x1 after bimanual uterine   massage and exploration was performed.    ESTIMATED BLOOD LOSS:  300 mL.       ____________________________________     GUMARO RUFF MD    HTA / NTS    DD:  11/06/2017 04:20:41  DT:  11/06/2017 04:47:10    D#:  [id number]  Job#:  [id number]    cc: _____ _____

## 2017-11-06 NOTE — H&P
DATE OF ADMISSION:  2017    DATE OF BIRTH:      ADMISSION DIAGNOSES:  1.  Intrauterine pregnancy at 39+4 weeks gestation.  2.  Induction of labor secondary to nephrotic syndrome with possible Alport   syndrome.  3.  Chronic hypertension.  4.  Hypothyroidism.    HISTORY OF PRESENT ILLNESS:  The patient is a 32-year-old  3, para   2-0-0-2 at 39+4 weeks gestation based upon her LMP of 2017, which is   consistent with a 13+5 week ultrasound performed on 2017, who presents   for induction of labor secondary to nephrotic syndrome with elevated blood   pressures.  The patient reports positive fetal movement.  She denies vaginal   bleeding, leakage of fluid, and regular and painful uterine contractions.  The   patient was admitted to labor and delivery where she was started on IV   Pitocin.  She received 2 doses of IV ampicillin.    PRENATAL CARE:  With Dr. Alesia Tan.  Transfer of care at 30 weeks   gestation.  Total visits 7.  Third trimester blood pressures 118 to 132 over   75 to 91.    PRENATAL LABS:  Blood type O positive, antibody screen negative, RPR   nonreactive, HIV negative, rubella equivocal, hepatitis C surface antigen   negative, 1 hour , hep C viral antibody negative, GBS positive on   10/04/2017.    OBSTETRIC ULTRASOUND:  1.  On 2017 at 13+5 weeks gestation, STACEY 2017, anterior placenta,   no previa.  2.  On 2017 at 15+1 week gestation, STACEY 2017.  3.  On 2017 at 30+2 weeks gestation, STACEY 2017.    PAST OBSTETRIC HISTORY:  1.  On 10/11/2008 at 41 weeks gestation, 8 pound 12 ounce male infant via   , no complications.  2.  On 2010 at 39 weeks gestation, 8 pound female infant via .    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    PAST SURGICAL HISTORY:  Total thyroidectomy in ,  laparoscopic   cholecystectomy, inguinal hernia repair in , ankle surgery, back surgery,   ear and eye surgery, and tonsillectomy.    ALLERGIES:  TO SULFA LEADING TO INCREASED LFTS AND SKIN RASH, IMURAN ITCHING   AND INCREASED LFTS, VICODIN LEADING TO ITCHINESS, AND TAPE LEADING TO SKIN   REACTION.    SOCIAL HISTORY:  She is .  She denies tobacco and alcohol, but does   admit to occasional marijuana smoking.  Father of the baby is named Elieser.    MEDICATIONS:  Include Synthroid, albuterol as needed, prenatal vitamins, and   albuterol inhaler.    PHYSICAL EXAMINATION:  VITAL SIGNS:  Stable.  She is afebrile.  Her blood pressures have ranged from   120s to 140s over 80s to high 90s.  GENERAL:  Alert, awake and oriented x3, in no acute distress.  PELVIC:  Sterile vaginal exam, her cervix is 3-4, 60% effaced, -2 station.    Artificial rupture of membranes of clear fluid was performed and an IUPC was   placed, toco, on Pitocin.  She is robin every 2-3 minutes on 8   milliunits.  External fetal monitoring, category 1 tracing.    ASSESSMENT AND PLAN:  A 32-year-old  3, para 2-0-0-2 at 39+4 weeks   gestation based upon her LMP, which is consistent with a 13+5 week ultrasound   performed on 2017, who presents to labor and delivery for induction of   labor secondary to nephrotic syndrome and elevated blood pressures.  She will   be admitted to labor and delivery.  She will undergo artificial rupture of   membranes of clear fluid.  IUPC will be placed.  She will be continued on   Pitocin per protocol.  She is status post 2 doses of IV antibiotics and she   will be continued on IV ampicillin per protocol.  We will anticipate a normal   spontaneous vaginal delivery.       ____________________________________     MD ANN MAKI / NTS    DD:  2017 18:43:06  DT:  2017 19:19:24    D#:  [id number]  Job#:  [id number]

## 2017-11-06 NOTE — PROGRESS NOTES
1900 Received report from from PETRA Peralta RN and assumed care. POC discussed with pt. Pt encouraged to state needs or questions at any time.    2100 SVE by MICKEY Mata RN [phone]    2300 80/0    0000 /0    0013 Dr. Tan called and given status update.    0020 Dr. Tan at bedside. Strip reviewed by provider. FSE placed.     0130 SVE by MICKEY Mata RN 0    0311 Pitocin stopped. 10L/min O2 via face mask applied. Pt turned to right side    0314 SVE by MICKEY Mata ant lip/+1    0316 IV fluids opened    0317 pt in hands and knees    0324 Dr. Tan called and given update    0327 Dr. Tan at bedside. Strip reviewed by provider    0328 SVE by Dr. Tan C/+1    0350  of viable male infant apgars 8/9    0400 Delivery of placenta S/I    0530 Report given to Hetal VACA

## 2017-11-06 NOTE — CARE PLAN
Problem: Pain  Goal: Alleviation of Pain or a reduction in pain to the patient's comfort goal  Outcome: PROGRESSING AS EXPECTED  Pain management options for labor discussed with pt, pt choosing to use breathing techniques, movement, and other natural techniques.     Problem: Risk for Infection, Impaired Wound Healing  Goal: Remain free from signs and symptoms of infection  Outcome: PROGRESSING AS EXPECTED  Pt is afebrile at this time and show no s/s of infection.

## 2017-11-06 NOTE — DELIVERY NOTE
LABOR AND DELIVERY    DELIVERY SUMMARY    STAGE I LABOR:    The patient was admitted as a 33 yo  at 39w4d for IOL secondary to nephrotic syndrome and thin basement membrane kidney disease with worsening blood pressures.  She was started on pitocin per protocol.  She was noted to be GBS positive and was started on Ampicillin IV per protocol.  She underwent AROM clear fluid at 1825 after two doses of antibiotics and an IUPC was placed at that time.  She had periods of a category II tracing and an FSE was placed.  She received an epidural for labor analgesia and progressed to complete at 0325.    STAGE II LABOR: 25 minutes    I was present for a  of a viable male infant.  The vertex of the infant delivered without difficulty in the direct OP position.   No nuchal cord was palpated and no shoulder dystocia was encountered.  The remainder of the infant delivered without difficulty.  Terminal meconium was noted.  The infant was placed on the mother's abdomen.  The infant's mouth and nose were bulb suctioned.  Delayed cord clamping was performed.  A segment of cord was clamped and cut for a cord gas and these results are pending.  The APGAR scores were 8 at one minute and 9 at five minutes.    The infant weight is pending.    STAGE III LABOR: 10 minutes    The placenta delivered intact with a 3-vessel cord.    The patient's perineum was examined and found to have sustained multiple first degree perineal lacerations which were repaired in the usual fashion with 3-0 vicryl on SH needle.    Bimanual uterine massage performed.  Small clots removed from the lower uterine segment.      Cytotec 1000 mcg per rectum given x 1.    EBL - 300 cc

## 2017-11-06 NOTE — H&P
History and Physical    Annamaria Hernandez is a 32 y.o. female  -Para:      Gestational Age:  39w4d.  Admitted for:   IOL secondary to nephrotic syndrome.  Admitted to  [facility].  Patient received prenatal care:Dr. Tan - transfer of care at 30w2d from Dr. Mora.    HPI: Patient is a 33 yo  at 39w4d based upon her LMP of 17 which is consistent with a 13w5d u/s performed on 17 who presents for IOL secondary to nephrotic syndrome.  She reports excellent fetal movement.  She denies vaginal bleeding, leakage of fluid, and regular and painful uterine contractions.    Patient denies fever, chills, nausea, vomiting , headache, visual disturbance, or dysuria  No LMP recorded. Patient has had an implant.  Estimated Date of Delivery: 17    Patient Active Problem List    Diagnosis Date Noted   • Lumbar radiculopathy 2016   • Acute on chronic SI joint pain 2016   • Postoperative hypothyroidism 01/15/2016   • Folliculitis 01/15/2016   • Candida vaginitis 01/15/2016   • Mild intermittent asthma 2015   • Mast cell disease 2014   • Dermatographic urticaria 2013   • Migraine headache 2013   • H/O thyroidectomy 2013   • History of strabismus 2013   • S/P cholecystectomy 2013   • S/P inguinal hernia repair 2013   • Ankle fracture 2013   • History of back surgery 2013   • Autoimmune disorder (CMS-HCC) 2012   • Hashimoto's thyroiditis 2012   • Thin basement membrane nephropathy 2012       Admitting DX: Pregnancy  IOL  Indication for care in labor or delivery  Pregnancy Complications:  Nephrotic syndrome.  Elevated blood pressures.  Possible Alport's syndrome.  Hypothyroidism.    History:   has a past medical history of Anemia; Hashimoto disease; Liver disease; and Renal disorder.     has a past surgical history that includes thyroidectomy; tonsillectomy; strabismus repair; ankle orif;  inguinal hernia repair; other abdominal surgery; other; other; other; and other.    OB History   No data available       Medications:  No current facility-administered medications on file prior to encounter.      Current Outpatient Prescriptions on File Prior to Encounter   Medication Sig Dispense Refill   • ondansetron (ZOFRAN) 4 MG Tab tablet TAKE 1 TAB BY MOUTH EVERY 8 HOURS AS NEEDED FOR NAUSEA/VOMITING. 20 Tab 0   • ranitidine (ZANTAC) 150 MG Tab TAKE 1 TABLET BY MOUTH 2 TIMES A DAY. 60 Tab 12   • cetirizine (ZYRTEC) 10 MG Tab TAKE 2 TABLETS BY MOUTH DAILY 90 Tab 12   • montelukast (SINGULAIR) 10 MG Tab TAKE 1 TAB BY MOUTH EVERY DAY. 60 Tab 12   • Budesonide, Inhalation, 180 MCG/ACT AEROSOL POWDER, BREATH ACTIVATED Inhale 1 Puff by mouth 2 Times a Day. 1 Each 0   • levothyroxine (SYNTHROID) 112 MCG Tab TAKE 2 TABLETS BY MOUTH EVERY MORNING ON AN EMPTY STOMACH. 180 Tab 3   • albuterol 108 (90 BASE) MCG/ACT Aero Soln inhalation aerosol Inhale 2 Puffs by mouth every 6 hours as needed for Shortness of Breath. 8.5 g 3   • ibuprofen (MOTRIN) 600 MG Tab Take 1 Tab by mouth every 8 hours as needed for Moderate Pain or Inflammation. 30 Tab 3   • metaxalone (SKELAXIN) 800 MG Tab Take 1 Tab by mouth 3 times a day. 90 Tab 3   • acyclovir (ZOVIRAX) 5 % Ointment APPLY TO COLD SORE 4 TIMES DAILY 30 g 0   • hydrOXYzine (ATARAX) 10 MG Tab Take 1 Tab by mouth 3 times a day as needed for Itching. 90 Tab 1   • albuterol (VENTOLIN OR PROVENTIL) 108 (90 BASE) MCG/ACT Aero Soln inhalation aerosol Inhale 2 Puffs by mouth every four hours as needed for Shortness of Breath. 1 Inhaler 3   • fluconazole (DIFLUCAN) 100 MG Tab Take 1 Tab by mouth every day. TAKE 1 TAB BY MOUTH EVERY DAY. 5 Tab 1   • Diclofenac Sodium (VOLTAREN) 1 % GEL Apply 4 gm to effected areas qid prn. 3 Tube 3   • SUMAtriptan Succinate (IMITREX PO) Take  by mouth.         Allergies:  Imuran [azathioprine sodium]; Sulfasalazine; Plaquenil [hydroxychloroquine sulfate];  "Vicodin [hydrocodone-acetaminophen]; Other drug; and Other misc    ROS:   Neuro: negative    Cardiovascular: negative  Gastro intestinal: negative  Genitourinary: negative            Physical Exam:  Ht 1.651 m (5' 5\")   Wt 100.2 kg (221 lb)   BMI 36.78 kg/m²   Constitutional:  A, A, and O x 3 NAD    Abdomen - gravid    EFW - 3700 grams    TOCO - every 2-4 minutes    EFM - category I tracing.  No decelerations.    Cervix - 3-4/60%/-2    Labs:  Recent Labs      11/05/17   1230   WBC  10.6   RBC  4.53   HEMOGLOBIN  14.4   HEMATOCRIT  42.1   MCV  92.9   MCH  31.8   MCHC  34.2   RDW  45.1   PLATELETCT  183   MPV  11.3     Prenatal Results    The patient does not have an associated pregnancy episode and working [employer]. Some results will not display without a pregnancy episode and working [employer].   1st Trimester     Test Value Date Time    ABO       RH       Antibody       CBC/PLT/DIFF       HGB       Platelets       HGB A1C        1 Hr GCT       3 Hr GTT       Rubella       RPR       Urine Culture       24 Urine Protein        24 Urine Creatinine        HBsAg       Hep CAB        HIV       Gonorrhea       Chlamydia       TSH        Free T4         TB       Pap       SYPHILUS TREP QUAL [id number] [[id number]][             2nd Trimester     Test Value Date Time    HCT       HGB       1 Hr GCT       3 Hr GTT             24-28 Weeks     Test Value Date Time    1 Hr GCT        TSH        Free T4        24 Urine Protein       24 Urine Creatinine       BUN       Creatinine       GFR       AST       ALT       Uric Acid       LDH             3rd Trimester     Test Value Date Time    HCT       HGB       TSH       Free T4       24 Hr Urine Protein       24 Hr Urine Creatinine       SYPHILUS TREP QUAL             35-37 Weeks     Test Value Date Time    GBS PCR LB       GBS PCR             Genetic Screening     Test Value Date Time    Cystic Fibrosis       AFP Quad       Sickle Cell                 GBS positive.    Assessment:  Gestational Age: "  IUP at 39w4d  Labor State:   Not in labor.  History of Nephrotic syndrome - thin basement membrane kidney disease.  GBS positive.    Patient Active Problem List    Diagnosis Date Noted   • Lumbar radiculopathy 2016   • Acute on chronic SI joint pain 2016   • Postoperative hypothyroidism 01/15/2016   • Folliculitis 01/15/2016   • Candida vaginitis 01/15/2016   • Mild intermittent asthma 2015   • Mast cell disease 2014   • Dermatographic urticaria 2013   • Migraine headache 2013   • H/O thyroidectomy 2013   • History of strabismus 2013   • S/P cholecystectomy 2013   • S/P inguinal hernia repair 2013   • Ankle fracture 2013   • History of back surgery 2013   • Autoimmune disorder (CMS-HCC) 2012   • Hashimoto's thyroiditis 2012   • Thin basement membrane nephropathy 2012       Plan:   Admitted for: IOL.  IV antibiotics.  IV pitocin.  AROM - clear fluid at 1825.  IUPC placed.  Anticipate .  Check TSH/Free T4 postpartum.    Alesia Krishnamurthy M.D.

## 2017-11-06 NOTE — CARE PLAN
Problem: Altered physiologic condition related to immediate post-delivery state and potential for bleeding/hemorrhage  Goal: Patient physiologically stable as evidenced by normal lochia, palpable uterine involution and vital signs within normal limits  Outcome: PROGRESSING AS EXPECTED  Fundus firm, lochia light    Problem: Potential for postpartum infection related to presence of episiotomy/vaginal tear and/or uterine contamination  Goal: Patient will be absent from signs and symptoms of infection  Outcome: PROGRESSING AS EXPECTED  No s/s of infection noted. Pt remains afebrile.    Problem: Alteration in comfort related to episiotomy, vaginal repair and/or after birth pains  Goal: Patient verbalizes acceptable pain level  Outcome: PROGRESSING AS EXPECTED

## 2017-11-06 NOTE — PROGRESS NOTES
0700 Report received, Britney at bedside for bimanual for bleeding.     0710 Per Krystle, small clots during Bimanual.    0750 RN at bedside to get PT to bathroom. PT was already up with out calling for assistance.  She stated she suddenly had diarrhea and could not hold it.  PT cleaned up and to WC.    0800 To PP.

## 2017-11-06 NOTE — PROGRESS NOTES
5608- Asked labor and delivery nurse, Billie, to assess pt's bleeding. Billie stated that her bleeding is moderate with some blood clots which was what my assessment had been. Billie stated that this RN should weigh the pads and call Dr. Tan if needed. Pads were weighed and it was 224 cc. Reported amount to Billie and she suggested to call Dr. Tan. MD ordered to give Methergine. No other orders given.

## 2017-11-06 NOTE — PROGRESS NOTES
2017  6:38 PM           1145) pt received to  for IOL,  at 39.4 weeks with   EFM applied.  POC discussed.  Pt educated on pain intervention and comfort measures, pt desires natural labor.  Family at bedside for support.   1215) IV started, labs drawn.    1230) Pitocin started, BP Q30 minutes.    1315) Antibiotics started  1600) Dr Tan on phone, updated on status.  Pt feels uc's with occasional uc with increased intensity, no intervention required  1700) Antibiotics #2 started  1825) Dr Tan at bedside, SVE /-2, AROm clear fluid, IUPC placed. Catherine pads changed.  Pt resting with FOB at bedside        1900) Report to KELLI Mata RN

## 2017-11-06 NOTE — PROGRESS NOTES
Report received from labor and delivery nurseBillie. Assessment completed. Pt states she will call when she needs prn pain medication. Pt denies any other issues at this time. Educated pt about feeding infant every 2-3 hours or when infant is showing cues. Pt encouraged to call for any needs.

## 2017-11-06 NOTE — PROGRESS NOTES
Labor Progress Note    Annamaria Hernandez   39w5d.  IOL for nephrotic syndrome.  Thin basement membrane disease.      Subjective:    Doing ok but very uncomfortable with contractions.  RN having difficulty monitoring baby.  Discussed need for FSE.  Patient agrees to plan.  Recommend that she have an epidural for labor analgesia and she will consider it.    Objective:   Vitals:    11/05/17 1956 11/05/17 2026 11/05/17 2056 11/05/17 2127   BP: 137/84 126/73 138/89 130/81   Pulse: 69 72 74 71   Temp:       TempSrc:       Weight:       Height:         SVE - 7-8/70%/-2    IUPC - every 3-5 minutes - MVUs 180-200.    FHT - 130 bpm with accelerations to 140 bpm.  Variable decelerations to 90 bpm.  Category II tracing.    Meds:     Pitocin: .yes    Labs:  Recent Results (from the past 24 hour(s))   Hold Blood Bank Specimen (Not Tested)    Collection Time: 11/05/17 12:30 PM   Result Value Ref Range    Holding Tube - Bb DONE    CBC WITH DIFFERENTIAL    Collection Time: 11/05/17 12:30 PM   Result Value Ref Range    WBC 10.6 4.8 - 10.8 K/uL    RBC 4.53 4.20 - 5.40 M/uL    Hemoglobin 14.4 12.0 - 16.0 g/dL    Hematocrit 42.1 37.0 - 47.0 %    MCV 92.9 81.4 - 97.8 fL    MCH 31.8 27.0 - 33.0 pg    MCHC 34.2 33.6 - 35.0 g/dL    RDW 45.1 35.9 - 50.0 fL    Platelet Count 183 164 - 446 K/uL    MPV 11.3 9.0 - 12.9 fL    Neutrophils-Polys 70.80 44.00 - 72.00 %    Lymphocytes 18.30 (L) 22.00 - 41.00 %    Monocytes 7.80 0.00 - 13.40 %    Eosinophils 0.90 0.00 - 6.90 %    Basophils 0.40 0.00 - 1.80 %    Immature Granulocytes 1.80 (H) 0.00 - 0.90 %    Nucleated RBC 0.00 /100 WBC    Neutrophils (Absolute) 7.47 (H) 2.00 - 7.15 K/uL    Lymphs (Absolute) 1.93 1.00 - 4.80 K/uL    Monos (Absolute) 0.82 0.00 - 0.85 K/uL    Eos (Absolute) 0.10 0.00 - 0.51 K/uL    Baso (Absolute) 0.04 0.00 - 0.12 K/uL    Immature Granulocytes (abs) 0.19 (H) 0.00 - 0.11 K/uL    NRBC (Absolute) 0.00 K/uL       Assessment/Plan:   39w5d.  Labor State: active labor.  FSE  placed.  Recommend epidural secondary to maternal discomfort.  Adequate MVUs  Anticipate .    Alesia Krishnamurthy MD

## 2017-11-07 VITALS
WEIGHT: 221 LBS | HEART RATE: 88 BPM | BODY MASS INDEX: 36.82 KG/M2 | RESPIRATION RATE: 18 BRPM | HEIGHT: 65 IN | SYSTOLIC BLOOD PRESSURE: 137 MMHG | DIASTOLIC BLOOD PRESSURE: 80 MMHG | OXYGEN SATURATION: 95 % | TEMPERATURE: 98.9 F

## 2017-11-07 LAB
ERYTHROCYTE [DISTWIDTH] IN BLOOD BY AUTOMATED COUNT: 45.4 FL (ref 35.9–50)
HCT VFR BLD AUTO: 38.7 % (ref 37–47)
HGB BLD-MCNC: 13.2 G/DL (ref 12–16)
MCH RBC QN AUTO: 32 PG (ref 27–33)
MCHC RBC AUTO-ENTMCNC: 34.1 G/DL (ref 33.6–35)
MCV RBC AUTO: 93.7 FL (ref 81.4–97.8)
PLATELET # BLD AUTO: 184 K/UL (ref 164–446)
PMV BLD AUTO: 11.2 FL (ref 9–12.9)
RBC # BLD AUTO: 4.13 M/UL (ref 4.2–5.4)
WBC # BLD AUTO: 14.1 K/UL (ref 4.8–10.8)

## 2017-11-07 PROCEDURE — 36415 COLL VENOUS BLD VENIPUNCTURE: CPT

## 2017-11-07 PROCEDURE — 90471 IMMUNIZATION ADMIN: CPT

## 2017-11-07 PROCEDURE — 90707 MMR VACCINE SC: CPT

## 2017-11-07 PROCEDURE — 700102 HCHG RX REV CODE 250 W/ 637 OVERRIDE(OP): Performed by: OBSTETRICS & GYNECOLOGY

## 2017-11-07 PROCEDURE — A9270 NON-COVERED ITEM OR SERVICE: HCPCS | Performed by: OBSTETRICS & GYNECOLOGY

## 2017-11-07 PROCEDURE — 700111 HCHG RX REV CODE 636 W/ 250 OVERRIDE (IP)

## 2017-11-07 PROCEDURE — 3E0134Z INTRODUCTION OF SERUM, TOXOID AND VACCINE INTO SUBCUTANEOUS TISSUE, PERCUTANEOUS APPROACH: ICD-10-PCS | Performed by: OBSTETRICS & GYNECOLOGY

## 2017-11-07 PROCEDURE — 85027 COMPLETE CBC AUTOMATED: CPT

## 2017-11-07 RX ADMIN — LEVOTHYROXINE SODIUM 224 MCG: 112 TABLET ORAL at 08:21

## 2017-11-07 RX ADMIN — IBUPROFEN 600 MG: 600 TABLET, FILM COATED ORAL at 13:02

## 2017-11-07 RX ADMIN — IBUPROFEN 600 MG: 600 TABLET, FILM COATED ORAL at 19:44

## 2017-11-07 RX ADMIN — Medication 1 TABLET: at 21:02

## 2017-11-07 RX ADMIN — IBUPROFEN 600 MG: 600 TABLET, FILM COATED ORAL at 06:02

## 2017-11-07 RX ADMIN — MEASLES, MUMPS, AND RUBELLA VIRUS VACCINE LIVE 0.5 ML: 1000; 12500; 1000 INJECTION, POWDER, LYOPHILIZED, FOR SUSPENSION SUBCUTANEOUS at 14:32

## 2017-11-07 ASSESSMENT — PAIN SCALES - GENERAL
PAINLEVEL_OUTOF10: 2
PAINLEVEL_OUTOF10: 2
PAINLEVEL_OUTOF10: 0
PAINLEVEL_OUTOF10: 0
PAINLEVEL_OUTOF10: 5
PAINLEVEL_OUTOF10: 5
PAINLEVEL_OUTOF10: 3
PAINLEVEL_OUTOF10: 2
PAINLEVEL_OUTOF10: 2

## 2017-11-07 ASSESSMENT — LIFESTYLE VARIABLES: EVER_SMOKED: NEVER

## 2017-11-07 NOTE — CARE PLAN
Problem: Altered physiologic condition related to immediate post-delivery state and potential for bleeding/hemorrhage  Goal: Patient physiologically stable as evidenced by normal lochia, palpable uterine involution and vital signs within normal limits  Outcome: PROGRESSING AS EXPECTED  Pt has firm fundus, normal lochia and her vital signs are within the defined parameters.    Problem: Potential for postpartum infection related to presence of episiotomy/vaginal tear and/or uterine contamination  Goal: Patient will be absent from signs and symptoms of infection  Outcome: PROGRESSING AS EXPECTED  Pt is free of signs and symptoms of infection.

## 2017-11-07 NOTE — PROGRESS NOTES
6462- Dr. Tan notified of patient's AM CBC results and that patient's fundus is firm and lochia scant.  Dr. Tan notified that patient desires to be discharged home this evening as infant has discharge orders for this evening.  Telephone order received to have patient follow up in the office in 5 weeks and to call to make appointment.  Telephone order received to instruct patient that she may take over the counter ibuprofen 600 mg, one tablet, PO Qhrs, prn for cramping and may take tylenol 500 mg, two tablets, PO four times a day as needed for pain, not to exceed 4 gm in 24 hours.

## 2017-11-07 NOTE — PROGRESS NOTES
PP day #1    S:  Pt doing well.  Minimal lochia.  No problems voiding. Breast feeding.  Staying until tomorrow for GBS + status    O:  T 97.4 P 61  /83  ABD:  Soft, NT, FF below umb  EXT: +1 pedal edema, no cords or Homans  H/H   O+  GBS +    A/P:  PP day #1 S/P  at term, GBS + - doing well  1.  Continue routine pp care  2.  Plan for D/C home 17

## 2017-11-07 NOTE — PROGRESS NOTES
Spoke to STARR regarding breastfeeding status. States breastfeeding going well, and denies pain with latch. She also says she  her other two children: one for 9 weeks, and the other for 18 months, and has been able to donate breastmilk to the [facility].     Discussed discharge feeding plan-   1- To breastfeed first.  2- if latch or breastfeeding is suboptimal, can supplement according to guidelines. (Volumes reviewed per infant birthweight)  3. Use breastpump to protect supply.     STARR has medicaid, and is established at [facility]. Advised her of being able to obtain [place] pump from the [facility] as needed, and to follow up with her [facility] office for lactation support.     MOB verbalized understanding and has no other questions or concerns regarding breastfeeding. Encouraged to call for assistance as needed.

## 2017-11-07 NOTE — PROGRESS NOTES
Late entry for 11/7/17 at 0710- Bedside report received from LIO Sanderson.  Patient denied needs.  0821- Patient assessment done.  Patient stated that she is voiding without difficulty and passing flatus.  Patient denied dizziness and stated that she is walking without difficulty.  Discussed pain management plan and patient prefers to call for pain intervention as needed.  Reviewed plan of care.  5742- Patient given her inhaler to take home this evening.  MMR vaccine given per MD order.

## 2017-11-07 NOTE — CARE PLAN
Problem: Alteration in comfort related to episiotomy, vaginal repair and/or after birth pains  Goal: Patient is able to ambulate, care for self and infant  Outcome: PROGRESSING AS EXPECTED  MOB cares well for self and infant.  Goal: Patient verbalizes acceptable pain level  Outcome: PROGRESSING AS EXPECTED  Pt cares well for self and infant.

## 2017-11-07 NOTE — CONSULTS
Discussed breastfeeding plan with mother, she agreed to have her nurse observe the next feeding. She feels that baby is basically learning how to latch. She will watch the [employer] videos today, instructions on how to do that were provided. Encouraged her to spend time skin to skin with baby and ask for assistance if needed.

## 2017-11-08 NOTE — PROGRESS NOTES
1630- Discharge instructions given to patient who verbalized understanding and stated she has no questions.  No written Rxs for discharge at this time.  Patient will call MD if she needs a prescription for pain medication.  Patient instructed to discuss her medication list with her physician.  Patient verbalized understanding.  3433- Patient declined pneumococcal vaccine at this time.

## 2017-12-14 ENCOUNTER — HOSPITAL ENCOUNTER (OUTPATIENT)
Facility: MEDICAL CENTER | Age: 32
End: 2017-12-14
Attending: OBSTETRICS & GYNECOLOGY
Payer: MEDICARE

## 2017-12-21 DIAGNOSIS — E89.0 POSTOPERATIVE HYPOTHYROIDISM: ICD-10-CM

## 2017-12-21 DIAGNOSIS — D47.09 MAST CELL DISEASE: ICD-10-CM

## 2017-12-21 DIAGNOSIS — R73.01 IFG (IMPAIRED FASTING GLUCOSE): ICD-10-CM

## 2017-12-22 RX ORDER — LEVOTHYROXINE SODIUM 112 UG/1
TABLET ORAL
Qty: 180 TAB | Refills: 0 | OUTPATIENT
Start: 2017-12-22

## 2017-12-22 NOTE — TELEPHONE ENCOUNTER
Was the patient seen in the last year in this department? No LAST OV 9/19/16 No future appts scheduled.    Does patient have an active prescription for medications requested? No     Received Request Via: Pharmacy

## 2017-12-22 NOTE — TELEPHONE ENCOUNTER
Refill declined.  Please advise pt and pharmacy.  She is past due for labs and appt.    Last office visit with me was on 4/19/16 and with Dr. Clements was on 9/19/16.  Her last thyroid function tests on 9/21/17 were not normal. She needs to have blood tests and to be evaluated clinic for refill medication and appropriate treatment.     Margret Titus MD  [facility] [address] [place] [address]

## 2017-12-27 ENCOUNTER — TELEPHONE (OUTPATIENT)
Dept: MEDICAL GROUP | Age: 32
End: 2017-12-27

## 2017-12-28 ENCOUNTER — OFFICE VISIT (OUTPATIENT)
Dept: MEDICAL GROUP | Age: 32
End: 2017-12-28
Payer: MEDICARE

## 2017-12-28 VITALS
DIASTOLIC BLOOD PRESSURE: 64 MMHG | WEIGHT: 206.9 LBS | OXYGEN SATURATION: 97 % | SYSTOLIC BLOOD PRESSURE: 98 MMHG | BODY MASS INDEX: 34.47 KG/M2 | HEART RATE: 89 BPM | TEMPERATURE: 97 F | HEIGHT: 65 IN

## 2017-12-28 DIAGNOSIS — E89.0 POSTOPERATIVE HYPOTHYROIDISM: ICD-10-CM

## 2017-12-28 DIAGNOSIS — Z30.09 FAMILY PLANNING: Primary | ICD-10-CM

## 2017-12-28 PROCEDURE — 99213 OFFICE O/P EST LOW 20 MIN: CPT | Performed by: FAMILY MEDICINE

## 2017-12-28 RX ORDER — MEDROXYPROGESTERONE ACETATE 150 MG/ML
150 INJECTION, SUSPENSION INTRAMUSCULAR
Status: DISCONTINUED | OUTPATIENT
Start: 2017-12-28 | End: 2018-04-16

## 2017-12-28 RX ORDER — LEVOTHYROXINE SODIUM 0.15 MG/1
150 TABLET ORAL
Qty: 90 TAB | Refills: 0 | Status: SHIPPED | OUTPATIENT
Start: 2017-12-28 | End: 2018-03-27 | Stop reason: SDUPTHER

## 2017-12-28 RX ADMIN — MEDROXYPROGESTERONE ACETATE 150 MG: 150 INJECTION, SUSPENSION INTRAMUSCULAR at 17:25

## 2017-12-28 ASSESSMENT — PATIENT HEALTH QUESTIONNAIRE - PHQ9: CLINICAL INTERPRETATION OF PHQ2 SCORE: 0

## 2017-12-28 NOTE — TELEPHONE ENCOUNTER
Labs are ordered.   Please advise patient to do fasting blood test one week before follow-up visit.      Margret Titus M.D.

## 2017-12-28 NOTE — TELEPHONE ENCOUNTER
Phone Number Called: [phone] (home)     Message: Pt states that she has been out of her medication since Friday and that if she gets her blood work done she is worried that the results would be inaccurate. Pt has an appointment with Dr. Ramos today at 4:40pm.     Left Message for patient to call back: no

## 2017-12-28 NOTE — TELEPHONE ENCOUNTER
ESTABLISHED PATIENT PRE-VISIT PLANNING     Note: Patient will not be contacted if there is no indication to call.     1.  Reviewed notes from the last few office visits within the medical group: Yes    2.  If any orders were placed at last visit or intended to be done for this visit (i.e. 6 mos follow-up), do we have Results/Consult Notes?        •  Labs - Labs ordered, completed on 9/12/17 and results are in chart.   Note: If patient appointment is for lab review and patient did not complete labs, check with provider if OK to reschedule patient until labs completed.       •  Imaging - Imaging ordered, NOT completed. Patient advised to complete prior to next appointment.       •  Referrals - No referrals were ordered at last office visit.    3. Is this appointment scheduled as a Hospital Follow-Up? No    4.  Immunizations were updated in Epic using WebIZ?: No WebIZ record       •  Web Iz Recommendations:     5.  Patient is due for the following Health Maintenance Topics:   Health Maintenance Due   Topic Date Due   • Annual Wellness Visit  11/15/2014   • IMM PNEUMOCOCCAL 19-64 (ADULT) MEDIUM RISK SERIES (1 of 1 - PPSV23) 11/14/2016   • IMM INFLUENZA (1) 09/01/2017       - Patient is up-to-date on all Health Maintenance topics. No records have been requested at this time.    6.  Patient was NOT informed to arrive 15 min prior to their scheduled appointment and bring in their medication bottles.

## 2017-12-29 NOTE — PROGRESS NOTES
Subjective:   CC: medication management    HPI:     Annamaria Hernandez is a 32 y.o. female, established patient of the clinic, presents with the following concerns:     1. Postoperative hypothyroidism  Pt has hx of postoperative hypothyroidism. She used to take 224 mcg of Levothyroxine. She recently gave birth to a healthy boy 2 months ago. She states that the dose of Levothyroxine was adjusted to 150 mcg daily during pregnancy. She continues this dose throughout pregnancy and until recently. However, she ran out of medication couple days ago, and was advised to schedule appointment for evaluation by PCP. She denies chest palpitation, hair loss, depressed mood, anxiety, nervousness, etc.     2. Family planning  Pt is , 2-month postpartum, still breast feeding, denies mood problems postpartum, breastfeeding going well. She has resumed sexual intercourse and does not want to get pregnant again. She is interested in Mirena. She has Mirena before and tolerated it well.     Current medicines (including changes today)  Current Outpatient Prescriptions   Medication Sig Dispense Refill   • levothyroxine (SYNTHROID) 150 MCG Tab Take 1 Tab by mouth Every morning on an empty stomach. 90 Tab 0   • Prenatal MV-Min-Fe Fum-FA-DHA (PRENATAL 1 PO) Take 1 Tab by mouth every day.     • ondansetron (ZOFRAN) 4 MG Tab tablet TAKE 1 TAB BY MOUTH EVERY 8 HOURS AS NEEDED FOR NAUSEA/VOMITING. 20 Tab 0   • ranitidine (ZANTAC) 150 MG Tab TAKE 1 TABLET BY MOUTH 2 TIMES A DAY. 60 Tab 12   • cetirizine (ZYRTEC) 10 MG Tab TAKE 2 TABLETS BY MOUTH DAILY 90 Tab 12   • montelukast (SINGULAIR) 10 MG Tab TAKE 1 TAB BY MOUTH EVERY DAY. 60 Tab 12   • ibuprofen (MOTRIN) 600 MG Tab Take 1 Tab by mouth every 8 hours as needed for Moderate Pain or Inflammation. 30 Tab 3   • metaxalone (SKELAXIN) 800 MG Tab Take 1 Tab by mouth 3 times a day. 90 Tab 3   • hydrOXYzine (ATARAX) 10 MG Tab Take 1 Tab by mouth 3 times a day as needed for Itching. 90 Tab 1   •  "fluconazole (DIFLUCAN) 100 MG Tab Take 1 Tab by mouth every day. TAKE 1 TAB BY MOUTH EVERY DAY. 5 Tab 1   • Diclofenac Sodium (VOLTAREN) 1 % GEL Apply 4 gm to effected areas qid prn. 3 Tube 3   • SUMAtriptan Succinate (IMITREX PO) Take  by mouth.       Current Facility-Administered Medications   Medication Dose Route Frequency Provider Last Rate Last Dose   • medroxyPROGESTERone (DEPO-PROVERA) injection 150 mg  150 mg Intramuscular EVERY 11 WEEKS Vivian Ramos M.D.         She  has a past medical history of Anemia; Hashimoto disease; Liver disease; and Renal disorder.    I personally reviewed patient's problem list, allergies, medications, family hx, social hx with patient and update EPIC.     REVIEW OF SYSTEMS:  CONSTITUTIONAL:  Denies night sweats, fatigue, malaise, lethargy, fever or chills.  RESPIRATORY:  Denies cough, wheeze, hemoptysis, or shortness of breath.  CARDIOVASCULAR:  Denies chest pains, palpitations, pedal edema     Objective:     Blood pressure (!) 98/64, pulse 89, temperature 36.1 °C (97 °F), height 1.645 m (5' 4.75\"), weight 93.8 kg (206 lb 14.4 oz), SpO2 97 %. Body mass index is 34.7 kg/m².    Physical Exam:  Constitutional: awake, alert, in no distress, obese.  Skin: Warm, dry, good turgor, no rashes, bruises, ulcers in visible areas.  Eye: conjunctiva clear, lids neg for edema or lesions.  Neck: Trachea midline, no masses, no thyromegaly. No cervical or supraclavicular lymphadenopathy  Respiratory: Unlabored respiratory effort, lungs clear to auscultation, no wheezes, no rhonchi, no rales.  Cardiovascular: Normal S1, S2, no murmur, no pedal edema.  Psych: Oriented x3, affect and mood wnl, intact judgement and insight.       Assessment and Plan:   The following treatment plan was discussed    1. Postoperative hypothyroidism  Chronic, used to take Levothyroxine 224 mcg daily, dose was reduced to 150 mcg during recent pregnancy, she is 2-month postpartum and ran out of medication. Plans:   - " levothyroxine (SYNTHROID) 150 MCG Tab; Take 1 Tab by mouth Every morning on an empty stomach.  Dispense: 90 Tab; Refill: 0  - TSH; Future    2. Family planning  , sexually active, 2-month postpartum, still breastfeeding, has resumed sexual intercourse. She is interested in contraception counseling. Plan:   - contraception counseling provided. Pt is interested in Mirena. Will start the application for insurance approval. In the mean time, pt agrees to Depo-Provera.   - medroxyPROGESTERone (DEPO-PROVERA) injection 150 mg; 1 mL by Intramuscular route EVERY 11 WEEKS.      Vivian Ramos M.D.      Followup: Return for As needed.    Please note that this dictation was created using voice recognition software. I have made every reasonable attempt to correct obvious errors, but I expect that there are errors of grammar and possibly content that I did not discover before finalizing the note.

## 2018-04-09 ENCOUNTER — HOSPITAL ENCOUNTER (OUTPATIENT)
Dept: LAB | Facility: MEDICAL CENTER | Age: 33
End: 2018-04-09
Attending: OBSTETRICS & GYNECOLOGY
Payer: MEDICARE

## 2018-04-09 LAB — B-HCG SERPL-ACNC: <0.6 MIU/ML (ref 0–5)

## 2018-04-09 PROCEDURE — 36415 COLL VENOUS BLD VENIPUNCTURE: CPT

## 2018-04-09 PROCEDURE — 84702 CHORIONIC GONADOTROPIN TEST: CPT

## 2018-04-10 ENCOUNTER — HOSPITAL ENCOUNTER (OUTPATIENT)
Dept: LAB | Facility: MEDICAL CENTER | Age: 33
End: 2018-04-10
Attending: INTERNAL MEDICINE
Payer: MEDICARE

## 2018-04-10 DIAGNOSIS — D47.09 MAST CELL DISEASE: ICD-10-CM

## 2018-04-10 DIAGNOSIS — R73.01 IFG (IMPAIRED FASTING GLUCOSE): ICD-10-CM

## 2018-04-10 DIAGNOSIS — E89.0 POSTOPERATIVE HYPOTHYROIDISM: ICD-10-CM

## 2018-04-10 LAB
ALBUMIN SERPL BCP-MCNC: 4.8 G/DL (ref 3.2–4.9)
ALBUMIN/GLOB SERPL: 2.2 G/DL
ALP SERPL-CCNC: 87 U/L (ref 30–99)
ALT SERPL-CCNC: 19 U/L (ref 2–50)
ANION GAP SERPL CALC-SCNC: 6 MMOL/L (ref 0–11.9)
AST SERPL-CCNC: 18 U/L (ref 12–45)
BASOPHILS # BLD AUTO: 0.5 % (ref 0–1.8)
BASOPHILS # BLD: 0.03 K/UL (ref 0–0.12)
BILIRUB SERPL-MCNC: 0.7 MG/DL (ref 0.1–1.5)
BUN SERPL-MCNC: 15 MG/DL (ref 8–22)
CALCIUM SERPL-MCNC: 9.3 MG/DL (ref 8.5–10.5)
CHLORIDE SERPL-SCNC: 107 MMOL/L (ref 96–112)
CHOLEST SERPL-MCNC: 166 MG/DL (ref 100–199)
CO2 SERPL-SCNC: 28 MMOL/L (ref 20–33)
CREAT SERPL-MCNC: 0.76 MG/DL (ref 0.5–1.4)
EOSINOPHIL # BLD AUTO: 0.14 K/UL (ref 0–0.51)
EOSINOPHIL NFR BLD: 2.2 % (ref 0–6.9)
ERYTHROCYTE [DISTWIDTH] IN BLOOD BY AUTOMATED COUNT: 41.9 FL (ref 35.9–50)
GLOBULIN SER CALC-MCNC: 2.2 G/DL (ref 1.9–3.5)
GLUCOSE SERPL-MCNC: 94 MG/DL (ref 65–99)
HCT VFR BLD AUTO: 47.6 % (ref 37–47)
HDLC SERPL-MCNC: 54 MG/DL
HGB BLD-MCNC: 16 G/DL (ref 12–16)
IMM GRANULOCYTES # BLD AUTO: 0.01 K/UL (ref 0–0.11)
IMM GRANULOCYTES NFR BLD AUTO: 0.2 % (ref 0–0.9)
LDLC SERPL CALC-MCNC: 101 MG/DL
LYMPHOCYTES # BLD AUTO: 2.24 K/UL (ref 1–4.8)
LYMPHOCYTES NFR BLD: 35.2 % (ref 22–41)
MCH RBC QN AUTO: 29.9 PG (ref 27–33)
MCHC RBC AUTO-ENTMCNC: 33.6 G/DL (ref 33.6–35)
MCV RBC AUTO: 89 FL (ref 81.4–97.8)
MONOCYTES # BLD AUTO: 0.39 K/UL (ref 0–0.85)
MONOCYTES NFR BLD AUTO: 6.1 % (ref 0–13.4)
NEUTROPHILS # BLD AUTO: 3.55 K/UL (ref 2–7.15)
NEUTROPHILS NFR BLD: 55.8 % (ref 44–72)
NRBC # BLD AUTO: 0 K/UL
NRBC BLD-RTO: 0 /100 WBC
PLATELET # BLD AUTO: 226 K/UL (ref 164–446)
PMV BLD AUTO: 10.3 FL (ref 9–12.9)
POTASSIUM SERPL-SCNC: 4 MMOL/L (ref 3.6–5.5)
PROT SERPL-MCNC: 7 G/DL (ref 6–8.2)
RBC # BLD AUTO: 5.35 M/UL (ref 4.2–5.4)
SODIUM SERPL-SCNC: 141 MMOL/L (ref 135–145)
T4 FREE SERPL-MCNC: 1.01 NG/DL (ref 0.53–1.43)
TRIGL SERPL-MCNC: 54 MG/DL (ref 0–149)
TSH SERPL DL<=0.005 MIU/L-ACNC: 1.28 UIU/ML (ref 0.38–5.33)
WBC # BLD AUTO: 6.4 K/UL (ref 4.8–10.8)

## 2018-04-10 PROCEDURE — 36415 COLL VENOUS BLD VENIPUNCTURE: CPT

## 2018-04-10 PROCEDURE — 84443 ASSAY THYROID STIM HORMONE: CPT

## 2018-04-10 PROCEDURE — 84439 ASSAY OF FREE THYROXINE: CPT

## 2018-04-10 PROCEDURE — 80061 LIPID PANEL: CPT

## 2018-04-10 PROCEDURE — 85025 COMPLETE CBC W/AUTO DIFF WBC: CPT

## 2018-04-10 PROCEDURE — 80053 COMPREHEN METABOLIC PANEL: CPT

## 2018-04-11 ENCOUNTER — PATIENT MESSAGE (OUTPATIENT)
Dept: MEDICAL GROUP | Age: 33
End: 2018-04-11

## 2018-04-12 ENCOUNTER — APPOINTMENT (OUTPATIENT)
Dept: MEDICAL GROUP | Age: 33
End: 2018-04-12
Payer: MEDICARE

## 2018-04-12 ENCOUNTER — PATIENT OUTREACH (OUTPATIENT)
Dept: HEALTH INFORMATION MANAGEMENT | Facility: OTHER | Age: 33
End: 2018-04-12

## 2018-04-12 NOTE — TELEPHONE ENCOUNTER
Phone Number Called:   [phone] (home)       Message: Pt decreased thyroid hormone from 224mg to 150mg and is experiencing hair loss, appointment made to discuss medication    Left Message for patient to call back: N\A

## 2018-04-13 ENCOUNTER — TELEPHONE (OUTPATIENT)
Dept: MEDICAL GROUP | Age: 33
End: 2018-04-13

## 2018-04-13 NOTE — TELEPHONE ENCOUNTER
Future Appointments       Provider Department Center    4/16/2018 4:40 PM Vivian Ramos M.D. [address]    5/9/2018 4:00 PM Margret Titus M.D.; [facility]  [address] [place]        ESTABLISHED PATIENT PRE-VISIT PLANNING     Note: Patient will not be contacted if there is no indication to call.     1.  Reviewed notes from the last few office visits within the medical group: Yes    2.  If any orders were placed at last visit or intended to be done for this visit (i.e. 6 mos follow-up), do we have Results/Consult Notes?        •  Labs - Labs ordered, completed on 4/10/18 and results are in chart.   Note: If patient appointment is for lab review and patient did not complete labs, check with provider if OK to reschedule patient until labs completed.       •  Imaging - Imaging was not ordered at last office visit.       •  Referrals - No referrals were ordered at last office visit.    3. Is this appointment scheduled as a Hospital Follow-Up? No    4.  Immunizations were updated in Epic using WebIZ?: No WebIZ record       •  Web Iz Recommendations: PNEUMOVAX (PPSV23)    5.  Patient is due for the following Health Maintenance Topics:   Health Maintenance Due   Topic Date Due   • Annual Wellness Visit  11/15/2014   • IMM PNEUMOCOCCAL 19-64 (ADULT) MEDIUM RISK SERIES (1 of 1 - PPSV23) 11/14/2016       - Patient is up-to-date on all Health Maintenance topics. No records have been requested at this time.    6.  MDX printed for Provider? YES    7.  Patient was NOT informed to arrive 15 min prior to their scheduled appointment and bring in their medication bottles.

## 2018-04-16 ENCOUNTER — OFFICE VISIT (OUTPATIENT)
Dept: MEDICAL GROUP | Age: 33
End: 2018-04-16
Payer: MEDICARE

## 2018-04-16 DIAGNOSIS — M54.5 LOW BACK PAIN, UNSPECIFIED BACK PAIN LATERALITY, UNSPECIFIED CHRONICITY, WITH SCIATICA PRESENCE UNSPECIFIED: ICD-10-CM

## 2018-04-16 DIAGNOSIS — Z30.09 FAMILY PLANNING: ICD-10-CM

## 2018-04-16 DIAGNOSIS — N91.1 SECONDARY AMENORRHEA: Primary | ICD-10-CM

## 2018-04-16 LAB
APPEARANCE UR: NORMAL
BILIRUB UR STRIP-MCNC: NORMAL MG/DL
COLOR UR AUTO: NORMAL
GLUCOSE UR STRIP.AUTO-MCNC: NORMAL MG/DL
INT CON NEG: NEGATIVE
INT CON POS: POSITIVE
KETONES UR STRIP.AUTO-MCNC: NORMAL MG/DL
LEUKOCYTE ESTERASE UR QL STRIP.AUTO: NORMAL
NITRITE UR QL STRIP.AUTO: NORMAL
PH UR STRIP.AUTO: 6 [PH] (ref 5–8)
POC URINE PREGNANCY TEST: NORMAL
PROT UR QL STRIP: NORMAL MG/DL
RBC UR QL AUTO: NORMAL
SP GR UR STRIP.AUTO: 1.03
UROBILINOGEN UR STRIP-MCNC: NORMAL MG/DL

## 2018-04-16 PROCEDURE — 81025 URINE PREGNANCY TEST: CPT | Performed by: FAMILY MEDICINE

## 2018-04-16 PROCEDURE — 99214 OFFICE O/P EST MOD 30 MIN: CPT | Performed by: FAMILY MEDICINE

## 2018-04-16 PROCEDURE — 81002 URINALYSIS NONAUTO W/O SCOPE: CPT | Performed by: FAMILY MEDICINE

## 2018-04-16 RX ORDER — ACETAMINOPHEN AND CODEINE PHOSPHATE 120; 12 MG/5ML; MG/5ML
1 SOLUTION ORAL DAILY
Qty: 28 TAB | Refills: 5 | Status: SHIPPED | OUTPATIENT
Start: 2018-04-16 | End: 2018-10-01

## 2018-04-17 ENCOUNTER — TELEPHONE (OUTPATIENT)
Dept: MEDICAL GROUP | Age: 33
End: 2018-04-17

## 2018-04-17 VITALS
HEART RATE: 74 BPM | SYSTOLIC BLOOD PRESSURE: 110 MMHG | TEMPERATURE: 98.1 F | BODY MASS INDEX: 32.62 KG/M2 | WEIGHT: 203 LBS | DIASTOLIC BLOOD PRESSURE: 68 MMHG | HEIGHT: 66 IN | OXYGEN SATURATION: 98 %

## 2018-04-17 NOTE — PROGRESS NOTES
Subjective:   CC: Missed period    HPI:     Annamaria Hernandez is a 33 y.o. female, established patient of the clinic, presents with the following concerns:     Patient is , sexually active, 5 months postpartum, still breast-feeding exclusively every 3 hours. Patient is currently using Depo-Provera for contraception. Last dose was on 2017. Patient complains of worsening postpartum depressive mood and crying without any reason. Patient is not interested in taking Depo-Provera. Patient states that she has resumed very light vaginal bleeding within 2-3 months after postpartum. However, she had missed a period for the past 23 days and is concerned of pregnancy. Patient wants to have 4 children but is not ready to have another child anytime soon. Patient was interested in Mirena for contraception. Application for Mirena was admitted submitted 3 months ago for insurance approval, however patient had not heard back from a insurance company or the clinic.    Patient has history of chronic, low intermittent back pain. She has severe reaction to oral anti-inflammatory medications. However, she appears to tolerate topical diclofenac gel well. Her symptoms have not changed after pregnancy. Patient denies fever, chills, lower extremity weakness, numbness, bowel or bladder symptoms.    Breast-feeding is doing well. Patient denies postpartum. Postpartum depression. Patient has resumed sexual intercourse. Her  also uses condoms consistently.      Current medicines (including changes today)  Current Outpatient Prescriptions   Medication Sig Dispense Refill   • norethindrone (SHAHLA) 0.35 MG tablet Take 1 Tab by mouth every day. Take one tablet at the same time every day. 28 Tab 5   • Diclofenac Sodium (VOLTAREN) 1 % Gel Apply 4 gm to effected areas qid prn. 3 Tube 3   • levothyroxine (SYNTHROID) 150 MCG Tab TAKE 1 TAB BY MOUTH EVERY MORNING ON AN EMPTY STOMACH. 90 Tab 1   • Prenatal MV-Min-Fe Fum-FA-DHA  "(PRENATAL 1 PO) Take 1 Tab by mouth every day.     • ranitidine (ZANTAC) 150 MG Tab TAKE 1 TABLET BY MOUTH 2 TIMES A DAY. 60 Tab 12   • cetirizine (ZYRTEC) 10 MG Tab TAKE 2 TABLETS BY MOUTH DAILY 90 Tab 12   • montelukast (SINGULAIR) 10 MG Tab TAKE 1 TAB BY MOUTH EVERY DAY. 60 Tab 12   • metaxalone (SKELAXIN) 800 MG Tab Take 1 Tab by mouth 3 times a day. 90 Tab 3   • hydrOXYzine (ATARAX) 10 MG Tab Take 1 Tab by mouth 3 times a day as needed for Itching. 90 Tab 1     No current facility-administered medications for this visit.      She  has a past medical history of Anemia; Hashimoto disease; Liver disease; and Renal disorder.    I personally reviewed patient's problem list, allergies, medications, family hx, social hx with patient and update EPIC.     REVIEW OF SYSTEMS:  CONSTITUTIONAL:  Denies night sweats, fatigue, malaise, lethargy, fever or chills.  RESPIRATORY:  Denies cough, wheeze, hemoptysis, or shortness of breath.  CARDIOVASCULAR:  Denies chest pains, palpitations, pedal edema     Objective:     Blood pressure 110/68, pulse 74, temperature 36.7 °C (98.1 °F), height 1.676 m (5' 6\"), weight 92.1 kg (203 lb), last menstrual period 03/01/2018, SpO2 98 %. Body mass index is 32.77 kg/m².    Physical Exam:  Constitutional: awake, alert, in no distress.  Skin: Warm, dry, good turgor, no rashes, bruises, ulcers in visible areas.  Neck: Trachea midline, no masses, no thyromegaly. No cervical or supraclavicular lymphadenopathy  Respiratory: Unlabored respiratory effort, lungs clear to auscultation, no wheezes, no rhonchi, no rales.  Cardiovascular: Normal S1, S2, no murmur, no pedal edema.  Abdomen: Soft, non-tender to palpation, active BS, no hernia, no hepatosplenomegaly, negative rebound or guarding.   Psych: Oriented x3, affect and mood wnl, intact judgement and insight.       Assessment and Plan:   The following treatment plan was discussed    1. Secondary amenorrhea  History and exam consistent with secondary " amenorrhea secondary to active breast-feeding. In office urine pregnancy test was negative. Reassurance provided.    2. Family planning  , sexually active, 5 months postpartum, still breast-feeding exclusively, interested in contraception, patient has been also uses condoms. Patient wants to have double protection due to fear of getting pregnant at this time. Plans:  - norethindrone (SHAHLA) 0.35 MG tablet; Take 1 Tab by mouth every day. Take one tablet at the same time every day.  Dispense: 28 Tab; Refill: 5  - Side effect discussed with patient    3. Low back pain, unspecified back pain laterality, unspecified chronicity, with sciatica presence unspecified  Chronic, stable, tolerating diclofenac gel well. Plans:  - Diclofenac Sodium (VOLTAREN) 1 % Gel; Apply 4 gm to effected areas qid prn.  Dispense: 3 Tube; Refill: 3  - Regular exercise, weight loss  - Follow-up with PCP when necessary      Vivian Ramos M.D.      Followup: Return for As needed.    Please note that this dictation was created using voice recognition software. I have made every reasonable attempt to correct obvious errors, but I expect that there are errors of grammar and possibly content that I did not discover before finalizing the note.

## 2018-04-17 NOTE — TELEPHONE ENCOUNTER
1. Caller Name: Annamaria                                         Call Back Number: [phone] (home)         Patient approves a detailed voicemail message: N\A    Spoke with patient regarding her Mirena. [facility] and [facility] has a $183.45 copay before Mirena will be released. Patient is wishing to stay with pills at this time. Patient will contatct office, if she changes her BCM later on. to re-start process of mirena

## 2018-04-30 ENCOUNTER — TELEPHONE (OUTPATIENT)
Dept: MEDICAL GROUP | Age: 33
End: 2018-04-30

## 2018-04-30 NOTE — TELEPHONE ENCOUNTER
Future Appointments       Provider Department Center    5/9/2018 4:00 PM Margret Titus M.D.; [facility] [address]        ANNUAL WELLNESS VISIT PRE-VISIT PLANNING WITHOUT OUTREACH    1.  Reviewed note from last office visit with PCP: YES    2.  If any orders were placed at last visit, do we have Results/Consult Notes?        •  Labs - Labs were not ordered at last office visit.  Note: If patient appointment is for lab review and patient did not complete labs, check with provider if OK to reschedule patient until labs completed.       •  Imaging - Imaging was not ordered at last office visit.       •  Referrals - No referrals were ordered at last office visit.    3.  Immunizations were updated in Epic using WebIZ?: Epic matches WebIZ       •  WebIZ Recommendations: PNEUMOVAX (PPSV23)        •  Is patient due for Tdap? NO       •  Is patient due for Shingles? NO     4.  Patient is due for the following Health Maintenance Topics:   Health Maintenance Due   Topic Date Due   • Annual Wellness Visit  11/15/2014   • IMM PNEUMOCOCCAL 19-64 (ADULT) MEDIUM RISK SERIES (1 of 1 - PPSV23) 11/14/2016       - Patient is up-to-date on all Health Maintenance topics. No records have been requested at this time.    5.  Reviewed/Updated the following with patient:       •   Preferred Pharmacy? YES       •   Preferred Lab? YES       •   Preferred Communication? YES       •   Allergies? YES       •   Medications? YES. Was Abstract Encounter opened and chart updated? YES       •   Social History? YES. Was Abstract Encounter opened and chart updated? YES       •   Family History (document living status of immediate family members and if + hx of  cancer, diabetes, hypertension, hyperlipidemia, heart attack, stroke) YES. Was Abstract Encounter opened and chart updated? YES    6.  Care Team Updated:       •   DME Company (gait device, O2, CPAP, etc.): YES       •   Other Specialists (eye doctor, derm,  GYN, cardiology, endo, etc): YES    7.  Patient has the following Care Path diagnoses on Problem List:  NONE    8.  MDX printed and highlighted for Provider? NO    9.  Patient was advised: “This is a free wellness visit. The provider will screen for medical conditions to help you stay healthy. If you have other concerns to address you may be asked to discuss these at a separate visit or there may be an additional fee.”     10.  Patient was informed to arrive 15 min prior to their scheduled appointment and bring in their medication bottles.

## 2018-05-09 ENCOUNTER — APPOINTMENT (OUTPATIENT)
Dept: MEDICAL GROUP | Age: 33
End: 2018-05-09
Payer: MEDICARE

## 2018-06-02 ENCOUNTER — HOSPITAL ENCOUNTER (OUTPATIENT)
Dept: RADIOLOGY | Facility: MEDICAL CENTER | Age: 33
End: 2018-06-02
Attending: PHYSICIAN ASSISTANT
Payer: MEDICARE

## 2018-06-02 ENCOUNTER — OFFICE VISIT (OUTPATIENT)
Dept: URGENT CARE | Facility: PHYSICIAN GROUP | Age: 33
End: 2018-06-02
Payer: MEDICARE

## 2018-06-02 VITALS
TEMPERATURE: 98.4 F | BODY MASS INDEX: 33.63 KG/M2 | WEIGHT: 197 LBS | OXYGEN SATURATION: 95 % | DIASTOLIC BLOOD PRESSURE: 84 MMHG | RESPIRATION RATE: 16 BRPM | HEART RATE: 73 BPM | SYSTOLIC BLOOD PRESSURE: 108 MMHG | HEIGHT: 64 IN

## 2018-06-02 DIAGNOSIS — M79.641 RIGHT HAND PAIN: ICD-10-CM

## 2018-06-02 PROCEDURE — 99214 OFFICE O/P EST MOD 30 MIN: CPT | Performed by: PHYSICIAN ASSISTANT

## 2018-06-02 PROCEDURE — 73130 X-RAY EXAM OF HAND: CPT | Mod: RT

## 2018-06-02 RX ORDER — IBUPROFEN 800 MG/1
800 TABLET ORAL EVERY 8 HOURS PRN
Qty: 30 TAB | Refills: 0 | Status: SHIPPED | OUTPATIENT
Start: 2018-06-02 | End: 2018-10-01

## 2018-06-02 ASSESSMENT — ENCOUNTER SYMPTOMS
MYALGIAS: 0
FEVER: 0
FOCAL WEAKNESS: 0
NUMBNESS: 0
ARTHRALGIAS: 1
JOINT SWELLING: 1
VOMITING: 0
NAUSEA: 0
TINGLING: 0
SENSORY CHANGE: 0
WEAKNESS: 0

## 2018-06-02 NOTE — PROGRESS NOTES
"Subjective:      Annamaria Hernandez is a 33 y.o. female who presents with Hand Pain (right hand/thumb pain; hit hand against door knob 2 weeks ago but has not had significant pain until now)            Hand Injury   This is a new problem. Episode onset: 2 weeks ago patient hit right hand on door knob around thumb. pain was okay up until yesterday. The problem has been unchanged. Associated symptoms include arthralgias and joint swelling. Pertinent negatives include no fever, myalgias, nausea, numbness, rash, vomiting or weakness. Exacerbated by: gripping, using left hand  She has tried NSAIDs for the symptoms. The treatment provided no relief.       Past Medical History:   Diagnosis Date   • Anemia     iron deficiency   • Hashimoto disease    • Liver disease     non viral hepatitis   • Renal disorder        Past Surgical History:   Procedure Laterality Date   • ANKLE ORIF     • INGUINAL HERNIA REPAIR     • OTHER      eyes    • OTHER      ears   • OTHER      tonsils   • OTHER      thyroid   • OTHER ABDOMINAL SURGERY      gall bladder   • STRABISMUS REPAIR     • THYROIDECTOMY     • TONSILLECTOMY         Family History   Problem Relation Age of Onset   • Hypertension Mother    • Diabetes Mother    • GI Mother      hep c   • Heart Disease Father    • Heart Failure Father    • Hypertension Father    • Diabetes Father        Allergies   Allergen Reactions   • Imuran [Azathioprine Sodium] Anaphylaxis     Liver enzymes rise significantly   • Iodine Hives   • Sulfasalazine Hives   • Plaquenil [Hydroxychloroquine Sulfate] Hives   • Vicodin [Hydrocodone-Acetaminophen] Itching   • Other Drug Hives     \"Iv contrast\"   • Other Misc      tape       Medications, Allergies, and current problem list reviewed today in Epic      Review of Systems   Constitutional: Negative for fever.   Gastrointestinal: Negative for nausea and vomiting.   Musculoskeletal: Positive for arthralgias, joint pain (right thumb pain ) and joint swelling. " "Negative for myalgias.   Skin: Negative for rash.   Neurological: Negative for tingling, sensory change, focal weakness, weakness and numbness.     All other systems reviewed and are negative.        Objective:     /84   Pulse 73   Temp 36.9 °C (98.4 °F)   Resp 16   Ht 1.626 m (5' 4\")   Wt 89.4 kg (197 lb)   SpO2 95%   BMI 33.81 kg/m²      Physical Exam   Constitutional: She is oriented to person, place, and time. She appears well-developed and well-nourished. No distress.   Pulmonary/Chest: Effort normal. No respiratory distress.   Musculoskeletal:        Hands:  Neurological: She is alert and oriented to person, place, and time. No cranial nerve deficit.   Psychiatric: She has a normal mood and affect. Her behavior is normal. Judgment and thought content normal.             6/2/2018 12:52 PM    HISTORY/REASON FOR EXAM:  Pain/Deformity Following Trauma.  Right hand pain after injury    TECHNIQUE/EXAM DESCRIPTION AND NUMBER OF VIEWS:  3 views of the RIGHT hand/wrist.    COMPARISON: None    FINDINGS:  There is amorphous soft tissue calcification versus ossification or old bone fragment lateral to the trapezium. This does not appear to represent an acute or subacute fracture. No acute or subacute fracture identified. No evidence of dislocation.   Impression       Amorphous soft tissue calcification or ossification versus old fracture fragment adjacent to the trapezium.            Assessment/Plan:     1. Right hand pain    Suspect De Quervain's tenosynovitis /Mommy's thumb  Encouraged RICE  RX Ibuprofen 800 mg  Thumb spica splint  Follow-up with Sports med  - DX-HAND 3+ RIGHT; Future     Differential diagnoses, Supportive care, and indications for immediate follow-up discussed with patient.   Instructed to return to clinic or nearest emergency department for any change in condition, further concerns, or worsening of symptoms.    The patient demonstrated a good understanding and agreed with the treatment " plan.    Annamaria Quezada P.A.-C.

## 2018-06-03 ENCOUNTER — TELEPHONE (OUTPATIENT)
Dept: URGENT CARE | Facility: PHYSICIAN GROUP | Age: 33
End: 2018-06-03

## 2018-06-03 NOTE — TELEPHONE ENCOUNTER
Pt called asking if she could get that pain medication that she denied yesterday.  Pt stated that she was starting to feel pain in the hand, more so than yesterday.  Thank you Annamaria.

## 2018-07-31 DIAGNOSIS — J45.20 MILD INTERMITTENT ASTHMA WITHOUT COMPLICATION: ICD-10-CM

## 2018-07-31 RX ORDER — ALBUTEROL SULFATE 90 UG/1
2 AEROSOL, METERED RESPIRATORY (INHALATION) EVERY 6 HOURS PRN
Qty: 18 INHALER | Refills: 3 | Status: SHIPPED | OUTPATIENT
Start: 2018-07-31 | End: 2018-10-01 | Stop reason: SDUPTHER

## 2018-08-10 ENCOUNTER — TELEPHONE (OUTPATIENT)
Dept: MEDICAL GROUP | Age: 33
End: 2018-08-10

## 2018-08-10 NOTE — TELEPHONE ENCOUNTER
Future Appointments       Provider Department Center    8/13/2018 6:40 PM Vivian Ramos M.D. [facility] [address]        ESTABLISHED PATIENT PRE-VISIT PLANNING     Note: Patient will not be contacted if there is no indication to call.     1.  Reviewed notes from the last few office visits within the medical group: Yes    2.  If any orders were placed at last visit or intended to be done for this visit (i.e. 6 mos follow-up), do we have Results/Consult Notes?        •  Labs - Labs were not ordered at last office visit.   Note: If patient appointment is for lab review and patient did not complete labs, check with provider if OK to reschedule patient until labs completed.       •  Imaging - Imaging was not ordered at last office visit.       •  Referrals - No referrals were ordered at last office visit.    3. Is this appointment scheduled as a Hospital Follow-Up? No    4.  Immunizations were updated in Epic using WebIZ?: Epic matches WebIZ       •  Web Iz Recommendations: PNEUMOVAX (PPSV23)    5.  Patient is due for the following Health Maintenance Topics:   Health Maintenance Due   Topic Date Due   • Annual Wellness Visit  11/15/2014   • IMM PNEUMOCOCCAL 19-64 (ADULT) MEDIUM RISK SERIES (1 of 1 - PPSV23) 11/14/2016       - Patient is up-to-date on all Health Maintenance topics. No records have been requested at this time.    6.  MDX printed for Provider? YES    7.  Patient was NOT informed to arrive 15 min prior to their scheduled appointment and bring in their medication bottles.

## 2018-08-13 ENCOUNTER — APPOINTMENT (OUTPATIENT)
Dept: MEDICAL GROUP | Age: 33
End: 2018-08-13
Payer: MEDICARE

## 2018-08-27 ENCOUNTER — APPOINTMENT (OUTPATIENT)
Dept: LAB | Facility: MEDICAL CENTER | Age: 33
End: 2018-08-27
Payer: MEDICARE

## 2018-08-27 ENCOUNTER — HOSPITAL ENCOUNTER (OUTPATIENT)
Dept: LAB | Facility: MEDICAL CENTER | Age: 33
End: 2018-08-27
Attending: OBSTETRICS & GYNECOLOGY
Payer: MEDICARE

## 2018-08-27 LAB
25(OH)D3 SERPL-MCNC: 27 NG/ML (ref 30–100)
BASOPHILS # BLD AUTO: 0.6 % (ref 0–1.8)
BASOPHILS # BLD: 0.04 K/UL (ref 0–0.12)
EOSINOPHIL # BLD AUTO: 0.16 K/UL (ref 0–0.51)
EOSINOPHIL NFR BLD: 2.5 % (ref 0–6.9)
ERYTHROCYTE [DISTWIDTH] IN BLOOD BY AUTOMATED COUNT: 39.8 FL (ref 35.9–50)
ESTRADIOL SERPL-MCNC: 28 PG/ML
FSH SERPL-ACNC: 7.5 MIU/ML
HCT VFR BLD AUTO: 48.1 % (ref 37–47)
HCV AB SER QL: NEGATIVE
HGB BLD-MCNC: 15.8 G/DL (ref 12–16)
IMM GRANULOCYTES # BLD AUTO: 0.01 K/UL (ref 0–0.11)
IMM GRANULOCYTES NFR BLD AUTO: 0.2 % (ref 0–0.9)
LH SERPL-ACNC: 5 IU/L
LYMPHOCYTES # BLD AUTO: 2.25 K/UL (ref 1–4.8)
LYMPHOCYTES NFR BLD: 34.9 % (ref 22–41)
MCH RBC QN AUTO: 29.7 PG (ref 27–33)
MCHC RBC AUTO-ENTMCNC: 32.8 G/DL (ref 33.6–35)
MCV RBC AUTO: 90.4 FL (ref 81.4–97.8)
MONOCYTES # BLD AUTO: 0.49 K/UL (ref 0–0.85)
MONOCYTES NFR BLD AUTO: 7.6 % (ref 0–13.4)
NEUTROPHILS # BLD AUTO: 3.5 K/UL (ref 2–7.15)
NEUTROPHILS NFR BLD: 54.2 % (ref 44–72)
NRBC # BLD AUTO: 0 K/UL
NRBC BLD-RTO: 0 /100 WBC
PLATELET # BLD AUTO: 275 K/UL (ref 164–446)
PMV BLD AUTO: 10.5 FL (ref 9–12.9)
PROGEST SERPL-MCNC: 0.33 NG/ML
RBC # BLD AUTO: 5.32 M/UL (ref 4.2–5.4)
T4 FREE SERPL-MCNC: 1.49 NG/DL (ref 0.53–1.43)
TSH SERPL DL<=0.005 MIU/L-ACNC: 1.35 UIU/ML (ref 0.38–5.33)
WBC # BLD AUTO: 6.5 K/UL (ref 4.8–10.8)

## 2018-08-27 PROCEDURE — 83001 ASSAY OF GONADOTROPIN (FSH): CPT

## 2018-08-27 PROCEDURE — 36415 COLL VENOUS BLD VENIPUNCTURE: CPT

## 2018-08-27 PROCEDURE — 84144 ASSAY OF PROGESTERONE: CPT

## 2018-08-27 PROCEDURE — 86803 HEPATITIS C AB TEST: CPT

## 2018-08-27 PROCEDURE — 83002 ASSAY OF GONADOTROPIN (LH): CPT

## 2018-08-27 PROCEDURE — 84439 ASSAY OF FREE THYROXINE: CPT

## 2018-08-27 PROCEDURE — 82306 VITAMIN D 25 HYDROXY: CPT

## 2018-08-27 PROCEDURE — 85025 COMPLETE CBC W/AUTO DIFF WBC: CPT

## 2018-08-27 PROCEDURE — 82670 ASSAY OF TOTAL ESTRADIOL: CPT

## 2018-08-27 PROCEDURE — 84443 ASSAY THYROID STIM HORMONE: CPT

## 2018-09-02 ENCOUNTER — PATIENT MESSAGE (OUTPATIENT)
Dept: MEDICAL GROUP | Age: 33
End: 2018-09-02

## 2018-09-04 NOTE — TELEPHONE ENCOUNTER
Please inform patient that I have not seen her over 2 years.  She needs to be seen in office for any new prescription.  Otherwise, she can get her Plan B from over-the-counter.     Margret Titus M.D.

## 2018-10-01 ENCOUNTER — OFFICE VISIT (OUTPATIENT)
Dept: MEDICAL GROUP | Age: 33
End: 2018-10-01
Payer: MEDICARE

## 2018-10-01 VITALS
WEIGHT: 181.2 LBS | DIASTOLIC BLOOD PRESSURE: 80 MMHG | HEART RATE: 72 BPM | HEIGHT: 64 IN | SYSTOLIC BLOOD PRESSURE: 108 MMHG | TEMPERATURE: 98 F | OXYGEN SATURATION: 96 % | BODY MASS INDEX: 30.93 KG/M2

## 2018-10-01 DIAGNOSIS — E89.0 POSTOPERATIVE HYPOTHYROIDISM: ICD-10-CM

## 2018-10-01 DIAGNOSIS — J45.20 MILD INTERMITTENT ASTHMA WITHOUT COMPLICATION: ICD-10-CM

## 2018-10-01 DIAGNOSIS — Z32.02 PREGNANCY EXAMINATION OR TEST, NEGATIVE RESULT: ICD-10-CM

## 2018-10-01 DIAGNOSIS — Z30.2 ENCOUNTER FOR STERILIZATION: ICD-10-CM

## 2018-10-01 DIAGNOSIS — Z30.42 ENCOUNTER FOR SURVEILLANCE OF INJECTABLE CONTRACEPTIVE: ICD-10-CM

## 2018-10-01 DIAGNOSIS — E55.9 VITAMIN D INSUFFICIENCY: ICD-10-CM

## 2018-10-01 DIAGNOSIS — L50.3 DERMATOGRAPHIC URTICARIA: ICD-10-CM

## 2018-10-01 DIAGNOSIS — Z23 NEED FOR PNEUMOCOCCAL VACCINE: ICD-10-CM

## 2018-10-01 DIAGNOSIS — D47.09 MAST CELL DISEASE: ICD-10-CM

## 2018-10-01 LAB
INT CON NEG: NEGATIVE
INT CON POS: POSITIVE
POC URINE PREGNANCY TEST: NEGATIVE

## 2018-10-01 PROCEDURE — G0009 ADMIN PNEUMOCOCCAL VACCINE: HCPCS | Performed by: INTERNAL MEDICINE

## 2018-10-01 PROCEDURE — 90732 PPSV23 VACC 2 YRS+ SUBQ/IM: CPT | Performed by: INTERNAL MEDICINE

## 2018-10-01 PROCEDURE — 99214 OFFICE O/P EST MOD 30 MIN: CPT | Mod: 25 | Performed by: INTERNAL MEDICINE

## 2018-10-01 PROCEDURE — 81025 URINE PREGNANCY TEST: CPT | Performed by: INTERNAL MEDICINE

## 2018-10-01 RX ORDER — CETIRIZINE HYDROCHLORIDE 10 MG/1
10 TABLET ORAL
Qty: 90 TAB | Refills: 3 | Status: SHIPPED | OUTPATIENT
Start: 2018-10-01 | End: 2018-12-30

## 2018-10-01 RX ORDER — MONTELUKAST SODIUM 10 MG/1
10 TABLET ORAL
Qty: 90 TAB | Refills: 3 | Status: SHIPPED | OUTPATIENT
Start: 2018-10-01 | End: 2019-10-11 | Stop reason: SDUPTHER

## 2018-10-01 RX ORDER — HYDROXYZINE HYDROCHLORIDE 10 MG/1
10 TABLET, FILM COATED ORAL 3 TIMES DAILY PRN
Qty: 90 TAB | Refills: 3 | Status: SHIPPED | OUTPATIENT
Start: 2018-10-01 | End: 2018-12-30

## 2018-10-01 RX ORDER — MEDROXYPROGESTERONE ACETATE 150 MG/ML
150 INJECTION, SUSPENSION INTRAMUSCULAR ONCE
Status: COMPLETED | OUTPATIENT
Start: 2018-10-01 | End: 2018-10-01

## 2018-10-01 RX ORDER — RANITIDINE 150 MG/1
150 TABLET ORAL 2 TIMES DAILY
Qty: 180 TAB | Refills: 3 | Status: SHIPPED | OUTPATIENT
Start: 2018-10-01 | End: 2019-10-14 | Stop reason: CLARIF

## 2018-10-01 RX ORDER — LEVOTHYROXINE SODIUM 0.15 MG/1
150 TABLET ORAL
Qty: 90 TAB | Refills: 1 | Status: SHIPPED | OUTPATIENT
Start: 2018-10-01 | End: 2019-01-22 | Stop reason: SDUPTHER

## 2018-10-01 RX ORDER — ALBUTEROL SULFATE 90 UG/1
2 AEROSOL, METERED RESPIRATORY (INHALATION) EVERY 6 HOURS PRN
Qty: 18 INHALER | Refills: 3 | Status: ON HOLD | OUTPATIENT
Start: 2018-10-01 | End: 2019-03-06

## 2018-10-01 RX ADMIN — MEDROXYPROGESTERONE ACETATE 150 MG: 150 INJECTION, SUSPENSION INTRAMUSCULAR at 11:42

## 2018-10-01 ASSESSMENT — PATIENT HEALTH QUESTIONNAIRE - PHQ9: CLINICAL INTERPRETATION OF PHQ2 SCORE: 0

## 2018-10-01 NOTE — ASSESSMENT & PLAN NOTE
Patient has history of Hashimoto thyroiditis status post complete thyroidectomy.  She is taking levothyroxine 150 mcg 1 tablet every morning on empty stomach.  Recent thyroid function tests in August 2018 showed that she has slightly elevated free T4 and TSH was normal.  We discussed to take levothyroxine 150 mcg 1 tablet every morning on empty stomach for 6 days a week and take half tablet of levothyroxine 150 mcg once a week on every Sunday.  I advised patient to recheck thyroid function test in 3 months.  She agreed with the current plan.    Results for JAQUI MARIA M KARMA (MRN [id number]) as of 10/1/2018 12:26   Ref. Range 8/27/2018 14:17   TSH Latest Ref Range: 0.380 - 5.330 uIU/mL 1.350   Free T-4 Latest Ref Range: 0.53 - 1.43 ng/dL 1.49 (H)

## 2018-10-01 NOTE — PROGRESS NOTES
Subjective:   Maria M Hernandez is a 33 y.o. female here today for evaluation and management of:      Vitamin D insufficiency  Patient reported that she is taking vitamin D 2000 units daily with calcium daily.  However, her calcium level remains low at 27 and recent blood test on 8/27/18.  We discussed to increase the dose of vitamin D to 4000 units daily.  She agreed with the plan.    Postoperative hypothyroidism  Patient has history of Hashimoto thyroiditis status post complete thyroidectomy.  She is taking levothyroxine 150 mcg 1 tablet every morning on empty stomach.  Recent thyroid function tests in August 2018 showed that she has slightly elevated free T4 and TSH was normal.  We discussed to take levothyroxine 150 mcg 1 tablet every morning on empty stomach for 6 days a week and take half tablet of levothyroxine 150 mcg once a week on every Sunday.  I advised patient to recheck thyroid function test in 3 months.  She agreed with the current plan.    Results for MARIA M HERNANDEZ (MRN [id number]) as of 10/1/2018 12:26   Ref. Range 8/27/2018 14:17   TSH Latest Ref Range: 0.380 - 5.330 uIU/mL 1.350   Free T-4 Latest Ref Range: 0.53 - 1.43 ng/dL 1.49 (H)       Mild intermittent asthma  Patient reported that her asthma is stable and well controlled.  She is taking singular 10 mg daily.  She also use albuterol inhaler as needed.  She denies side effects from taking Singulair or albuterol inhaler.  She requested to refill both Singulair and albuterol inhaler today.    Mast cell disease  Patient has history of mast cell disease and she is taking multiple antihistamines as instructed by [facility].  Patient is taking cetirizine 10 mg twice daily, hydroxyzine 10 mg 3 times daily as needed, singular 10 mg daily, ranitidine 150 mg twice daily.  Patient stated that she has retched all the time.  I discussed with patient regarding the sedating effect of cetirizine and hydroxyzine.  Patient agreed to try Claritin 10 mg in  the daytime and suggesting 10 mg at night.  She stated that she only takes hydroxyzine 10 mg at night or twice daily as needed.  Patient denied drowsiness or side effects from taking those medications.  She requested to refill hydroxyzine, cetirizine, Singulair, ranitidine and albuterol inhaler today.    Dermatographic urticaria  This is chronic.  Her symptom is stable.  She is taking antihistamine as discussed above.  She denies side effects from taking current antihistamines.  She requested to refill hydroxyzine, cetirizine, ranitidine, Singulair and albuterol inhaler today.    Encounter for surveillance of injectable contraceptive  Patient stated that she has 3 children and the last child is 10 months old boy.  She stated that she wanted to complete her family planning and she did not want to have children anymore.  She received depo injection 3 months ago.  She wanted to have depo injection today.  She denied reaction or side effects from receiving depo injection in the past.  She did not want to take oral contraceptive pill daily.  She is interested to do sterilization with tubal ligation.  She agreed to refer to gynecologist to discuss tubal ligation.  She initially plan to do IUD placement but she postponed the IUD due to the cost of IUD.  She will discuss with gynecologist regarding IUD versus tubal ligation.         Current medicines (including changes today)  Current Outpatient Prescriptions   Medication Sig Dispense Refill   • albuterol (VENTOLIN HFA) 108 (90 Base) MCG/ACT Aero Soln inhalation aerosol Inhale 2 Puffs by mouth every 6 hours as needed for Shortness of Breath. 18 Inhaler 3   • raNITidine (ZANTAC) 150 MG Tab Take 1 Tab by mouth 2 times a day. 180 Tab 3   • cetirizine (ZYRTEC) 10 MG Tab Take 1 Tab by mouth every bedtime. 90 Tab 3   • hydrOXYzine HCl (ATARAX) 10 MG Tab Take 1 Tab by mouth 3 times a day as needed for Itching. 90 Tab 3   • montelukast (SINGULAIR) 10 MG Tab Take 1 Tab by mouth  "every day. TAKE 1 TAB BY MOUTH EVERY DAY. 90 Tab 3   • levothyroxine (SYNTHROID) 150 MCG Tab Take 1 Tab by mouth Every morning on an empty stomach. 90 Tab 1   • Diclofenac Sodium (VOLTAREN) 1 % Gel Apply 4 gm to effected areas qid prn. 3 Tube 3   • Prenatal MV-Min-Fe Fum-FA-DHA (PRENATAL 1 PO) Take 1 Tab by mouth every day.       No current facility-administered medications for this visit.      She  has a past medical history of Anemia; Hashimoto disease; Liver disease; and Renal disorder.    ROS   No chest pain, no shortness of breath, no abdominal pain       Objective:     Blood pressure 108/80, pulse 72, temperature 36.7 °C (98 °F), temperature source Temporal, height 1.626 m (5' 4\"), weight 82.2 kg (181 lb 3.2 oz), last menstrual period 09/09/2018, SpO2 96 %, not currently breastfeeding. Body mass index is 31.1 kg/m².   Physical Exam:  General: Alert, oriented and no acute distress.  Eye contact is good, speech goal directed, affect calm  HEENT: conjunctiva non-injected, sclera non-icteric.  Oral mucous membranes pink and moist with no lesions.  Pinna normal.  Lungs: Normal respiratory effort, clear to auscultation bilaterally with good excursion.  CV: regular rate and rhythm. No murmurs.  Abdomen: soft, non distended, nontender, Bowel sound normal.  Ext: no edema, color normal, vascularity normal, temperature normal        Assessment and Plan:   The following treatment plan was discussed     1. Mild intermittent asthma without complication  - Well-controlled.  Not on acute respiratory distress.  Continue current regimens, singular 10 mg daily and albuterol inhaler 2 puffs every 6 hours as needed. Recheck lab 1-2 weeks before next follow up visit.  - albuterol (VENTOLIN HFA) 108 (90 Base) MCG/ACT Aero Soln inhalation aerosol; Inhale 2 Puffs by mouth every 6 hours as needed for Shortness of Breath.  Dispense: 18 Inhaler; Refill: 3  - montelukast (SINGULAIR) 10 MG Tab; Take 1 Tab by mouth every day. TAKE 1 TAB BY " MOUTH EVERY DAY.  Dispense: 90 Tab; Refill: 3  - CBC WITH DIFFERENTIAL; Future  - COMP METABOLIC PANEL; Future    2. Dermatographic urticaria  - Stable.  Continue current regimens.  Reviewed the use and side effects of ranitidine, cetirizine, hydroxyzine, Singulair with patient.  Advised to increase water intake.  Advised to continue to monitor possible side effects.  - raNITidine (ZANTAC) 150 MG Tab; Take 1 Tab by mouth 2 times a day.  Dispense: 180 Tab; Refill: 3  - cetirizine (ZYRTEC) 10 MG Tab; Take 1 Tab by mouth every bedtime.  Dispense: 90 Tab; Refill: 3  - hydrOXYzine HCl (ATARAX) 10 MG Tab; Take 1 Tab by mouth 3 times a day as needed for Itching.  Dispense: 90 Tab; Refill: 3  - montelukast (SINGULAIR) 10 MG Tab; Take 1 Tab by mouth every day. TAKE 1 TAB BY MOUTH EVERY DAY.  Dispense: 90 Tab; Refill: 3    3. Mast cell disease  - Well-controlled. Continue current regimens. Recheck lab 1-2 weeks before next follow up visit.  - raNITidine (ZANTAC) 150 MG Tab; Take 1 Tab by mouth 2 times a day.  Dispense: 180 Tab; Refill: 3  - cetirizine (ZYRTEC) 10 MG Tab; Take 1 Tab by mouth every bedtime.  Dispense: 90 Tab; Refill: 3  - hydrOXYzine HCl (ATARAX) 10 MG Tab; Take 1 Tab by mouth 3 times a day as needed for Itching.  Dispense: 90 Tab; Refill: 3  - montelukast (SINGULAIR) 10 MG Tab; Take 1 Tab by mouth every day. TAKE 1 TAB BY MOUTH EVERY DAY.  Dispense: 90 Tab; Refill: 3    4. Postoperative hypothyroidism  - Slightly elevated free T4.  Patient is advised to cut down levothyroxine to half tab every Sunday.  She will take levothyroxine 150 mcg 1 tab every morning on empty stomach for 6 days a week and half tab for once a week.  Will recheck thyroid function test in 3 months.  - levothyroxine (SYNTHROID) 150 MCG Tab; Take 1 Tab by mouth Every morning on an empty stomach.  Dispense: 90 Tab; Refill: 1  - TSH; Future  - FREE THYROXINE; Future    5. Pregnancy examination or test, negative result  - POCT urine pregnancy  test was negative in clinic today.  - POCT Pregnancy    6. Encounter for surveillance of injectable contraceptive  - Discussed different contraception methods.  Patient wanted to receive Depo-Provera injection today.  Reviewed the risks and benefits of Depo-Provera injection.  Patient received 1 dose of Depo-Provera intramuscular injection.  - medroxyPROGESTERone (DEPO-PROVERA) injection 150 mg; 1 mL by Intramuscular route Once.    7. Encounter for sterilization  - Referred to gynecologist for discussion of sterilization with tubal ligation.  She wanted to discuss with gynecologist regarding tubal ligation versus IUD placement.  - REFERRAL TO GYNECOLOGY    8. Vitamin D insufficiency  - Not well controlled.  Advised patient to increase the dose of vitamin D to 4000 units daily.  Recheck vitamin D in 3 months.  - VITAMIN D,25 HYDROXY; Future    9. Need for pneumococcal vaccine  - Pneumovax 23 vaccine was given today after reviewing risks and benefits as well as side effects of vaccine.  - Pneumococal Polysaccharide Vaccine 23-Valent =>1YO SQ/IM    10. Health Maintenance   - Provide counseling for flu vaccine and the risks and benefits of flu vaccine.  Patient will have flu vaccine in pharmacy as we ran out of flu vaccine in clinic today.    Face-to-face time spent 40 minutes with patient and more than half of that time spent for counseling and cooperating of care for medical problems listed above.       Followup: Return in about 3 months (around 1/1/2019), or if symptoms worsen or fail to improve, for mast cell disease, asthma, Vitamin D insufficiency, Lab review.      Please note that this dictation was created using voice recognition software. I have made every reasonable attempt to correct obvious errors, but I expect that there may have unintended errors in text, spelling, punctuation, or grammar that I did not discover.

## 2018-10-01 NOTE — ASSESSMENT & PLAN NOTE
Patient reported that she is taking vitamin D 2000 units daily with calcium daily.  However, her calcium level remains low at 27 and recent blood test on 8/27/18.  We discussed to increase the dose of vitamin D to 4000 units daily.  She agreed with the plan.

## 2018-10-01 NOTE — ASSESSMENT & PLAN NOTE
Patient reported that her asthma is stable and well controlled.  She is taking singular 10 mg daily.  She also use albuterol inhaler as needed.  She denies side effects from taking Singulair or albuterol inhaler.  She requested to refill both Singulair and albuterol inhaler today.

## 2018-10-01 NOTE — ASSESSMENT & PLAN NOTE
Patient stated that she has 3 children and the last child is 10 months old boy.  She stated that she wanted to complete her family planning and she did not want to have children anymore.  She received depo injection 3 months ago.  She wanted to have depo injection today.  She denied reaction or side effects from receiving depo injection in the past.  She did not want to take oral contraceptive pill daily.  She is interested to do sterilization with tubal ligation.  She agreed to refer to gynecologist to discuss tubal ligation.  She initially plan to do IUD placement but she postponed the IUD due to the cost of IUD.  She will discuss with gynecologist regarding IUD versus tubal ligation.

## 2018-10-01 NOTE — ASSESSMENT & PLAN NOTE
This is chronic.  Her symptom is stable.  She is taking antihistamine as discussed above.  She denies side effects from taking current antihistamines.  She requested to refill hydroxyzine, cetirizine, ranitidine, Singulair and albuterol inhaler today.

## 2018-10-01 NOTE — ASSESSMENT & PLAN NOTE
Patient has history of mast cell disease and she is taking multiple antihistamines as instructed by [facility].  Patient is taking cetirizine 10 mg twice daily, hydroxyzine 10 mg 3 times daily as needed, singular 10 mg daily, ranitidine 150 mg twice daily.  Patient stated that she has retched all the time.  I discussed with patient regarding the sedating effect of cetirizine and hydroxyzine.  Patient agreed to try Claritin 10 mg in the daytime and suggesting 10 mg at night.  She stated that she only takes hydroxyzine 10 mg at night or twice daily as needed.  Patient denied drowsiness or side effects from taking those medications.  She requested to refill hydroxyzine, cetirizine, Singulair, ranitidine and albuterol inhaler today.

## 2018-10-11 ENCOUNTER — HOSPITAL ENCOUNTER (OUTPATIENT)
Dept: LAB | Facility: MEDICAL CENTER | Age: 33
End: 2018-10-11
Attending: OBSTETRICS & GYNECOLOGY
Payer: MEDICARE

## 2018-10-11 LAB — B-HCG SERPL-ACNC: <0.6 MIU/ML (ref 0–5)

## 2018-10-11 PROCEDURE — 36415 COLL VENOUS BLD VENIPUNCTURE: CPT

## 2018-10-11 PROCEDURE — 84702 CHORIONIC GONADOTROPIN TEST: CPT

## 2018-11-20 ENCOUNTER — APPOINTMENT (OUTPATIENT)
Dept: RADIOLOGY | Facility: MEDICAL CENTER | Age: 33
End: 2018-11-20
Attending: OBSTETRICS & GYNECOLOGY
Payer: MEDICARE

## 2018-12-30 ENCOUNTER — OFFICE VISIT (OUTPATIENT)
Dept: URGENT CARE | Facility: MEDICAL CENTER | Age: 33
End: 2018-12-30
Payer: MEDICARE

## 2018-12-30 VITALS
HEIGHT: 64 IN | WEIGHT: 172 LBS | SYSTOLIC BLOOD PRESSURE: 110 MMHG | TEMPERATURE: 98.2 F | HEART RATE: 83 BPM | DIASTOLIC BLOOD PRESSURE: 70 MMHG | BODY MASS INDEX: 29.37 KG/M2 | OXYGEN SATURATION: 97 %

## 2018-12-30 DIAGNOSIS — J02.9 SORE THROAT: ICD-10-CM

## 2018-12-30 DIAGNOSIS — J02.9 ACUTE PHARYNGITIS, UNSPECIFIED ETIOLOGY: Primary | ICD-10-CM

## 2018-12-30 LAB
INT CON NEG: NORMAL
INT CON POS: NORMAL
S PYO AG THROAT QL: NORMAL

## 2018-12-30 PROCEDURE — 99213 OFFICE O/P EST LOW 20 MIN: CPT | Performed by: NURSE PRACTITIONER

## 2018-12-30 PROCEDURE — 87880 STREP A ASSAY W/OPTIC: CPT | Performed by: NURSE PRACTITIONER

## 2018-12-31 NOTE — PROGRESS NOTES
"Subjective:      Annamaria Hernandez is a 33 y.o. female who presents with Pharyngitis (niece had strep friday-*--, now has sore throat and is cough x2days)          Denies past medical, surgical or family history that is significant to today's problem.   RX or OTC medications reviewed with patient today.       HPI 6 this is a new problem.  There is a 33-year-old female who presents with a sore throat for 2 days.  Her niece has recently been diagnosed with strep throat.  She is concerned that she has also gotten strep throat.  She has had a nonproductive cough for the same amount of time.  She denies fevers, chills, nausea, vomiting, rash, shortness of breath, abdominal pain.  Treatments tried cold fluids.  This is provided her some relief of her symptoms.  No other aggravating or alleviating factors.    ROS  See HPI     Objective:     /70 (BP Location: Left arm, Patient Position: Sitting, BP Cuff Size: Adult)   Pulse 83   Temp 36.8 °C (98.2 °F) (Temporal)   Ht 1.626 m (5' 4\")   Wt 78 kg (172 lb)   SpO2 97%   BMI 29.52 kg/m²      Physical Exam   Constitutional: She is oriented to person, place, and time. Vital signs are normal. She appears well-developed and well-nourished.  Non-toxic appearance. No distress.   HENT:   Head: Normocephalic.   Right Ear: Hearing, tympanic membrane, external ear and ear canal normal.   Left Ear: Hearing, tympanic membrane, external ear and ear canal normal.   Nose: Nose normal. Right sinus exhibits no frontal sinus tenderness. Left sinus exhibits no frontal sinus tenderness.   Mouth/Throat: Uvula is midline. Posterior oropharyngeal erythema present. No oropharyngeal exudate, posterior oropharyngeal edema or tonsillar abscesses.   Eyes: Pupils are equal, round, and reactive to light. Lids are normal.   Neck: Trachea normal, normal range of motion, full passive range of motion without pain and phonation normal. Neck supple.   Cardiovascular: Normal rate, regular rhythm and " normal pulses.    Pulmonary/Chest: Effort normal and breath sounds normal. No respiratory distress.   Abdominal: Soft.   Musculoskeletal: Normal range of motion.   Lymphadenopathy:        Head (right side): No tonsillar adenopathy present.        Head (left side): No tonsillar adenopathy present.     She has no cervical adenopathy.        Right: No supraclavicular adenopathy present.        Left: No supraclavicular adenopathy present.   Neurological: She is alert and oriented to person, place, and time. She has normal reflexes.   Skin: Skin is warm and dry.   Psychiatric: She has a normal mood and affect. Her speech is normal and behavior is normal.   Nursing note and vitals reviewed.         Strep screen: negative       Assessment/Plan:     1. Sore throat  POCT Rapid Strep A    Alum & Mag Hydroxide-Simeth (MBX) Suspension   2. Acute pharyngitis, unspecified etiology       Discussed that I felt this was viral in nature. Did not see any evidence of a bacterial process. Discussed natural progression and sx care.  Return to clinic or PCP prn  if current symptoms are not resolving in a satisfactory manner or sooner if new or worsening symptoms occur.   Patient was advised of signs and symptoms which would warrant further evaluation and /or emergent evaluation in ER.  Verbalized agreement with this treatment plan and seemed to understand without barriers. Questions were encouraged and answered to patients satisfaction.   Keep well hydrated

## 2019-01-07 ENCOUNTER — APPOINTMENT (OUTPATIENT)
Dept: MEDICAL GROUP | Age: 34
End: 2019-01-07
Payer: MEDICARE

## 2019-01-13 ENCOUNTER — HOSPITAL ENCOUNTER (OUTPATIENT)
Dept: RADIOLOGY | Facility: MEDICAL CENTER | Age: 34
End: 2019-01-13
Attending: OBSTETRICS & GYNECOLOGY
Payer: MEDICARE

## 2019-01-13 DIAGNOSIS — N83.202 BILATERAL OVARIAN CYSTS: ICD-10-CM

## 2019-01-13 DIAGNOSIS — N83.201 BILATERAL OVARIAN CYSTS: ICD-10-CM

## 2019-01-13 PROCEDURE — 76830 TRANSVAGINAL US NON-OB: CPT

## 2019-01-21 ENCOUNTER — TELEPHONE (OUTPATIENT)
Dept: MEDICAL GROUP | Age: 34
End: 2019-01-21

## 2019-01-21 NOTE — TELEPHONE ENCOUNTER
ESTABLISHED PATIENT PRE-VISIT PLANNING     Patient was contacted to complete PVP.     Note: Patient will not be contacted if there is no indication to call.     1.  Reviewed notes from the last few office visits within the medical group: Yes    2.  If any orders were placed at last visit or intended to be done for this visit (i.e. 6 mos follow-up), do we have Results/Consult Notes?        •  Labs - Labs ordered, NOT completed. Patient advised to complete prior to next appointment.   Note: If patient appointment is for lab review and patient did not complete labs, check with provider if OK to reschedule patient until labs completed.       •  Imaging - Imaging ordered, completed and results are in chart.       •  Referrals - Referral ordered, patient has NOT been seen.    3. Is this appointment scheduled as a Hospital Follow-Up? No    4.  Immunizations were updated in Epic using WebIZ?: Epic matches WebIZ       •  Web Iz Recommendations: FLU    5.  Patient is due for the following Health Maintenance Topics:   Health Maintenance Due   Topic Date Due   • Annual Wellness Visit  11/15/2014   • IMM INFLUENZA (1) 09/01/2018   • PAP SMEAR  12/17/2018           6. Orders for overdue Health Maintenance topics pended in Pre-Charting? N\A    7.  AHA (MDX) form printed for Provider? YES    8.  Patient was informed to arrive 15 min prior to their scheduled appointment and bring in their medication bottles.

## 2019-01-22 ENCOUNTER — OFFICE VISIT (OUTPATIENT)
Dept: MEDICAL GROUP | Age: 34
End: 2019-01-22
Payer: MEDICARE

## 2019-01-22 ENCOUNTER — HOSPITAL ENCOUNTER (OUTPATIENT)
Dept: LAB | Facility: MEDICAL CENTER | Age: 34
End: 2019-01-22
Attending: INTERNAL MEDICINE
Payer: MEDICARE

## 2019-01-22 VITALS
SYSTOLIC BLOOD PRESSURE: 110 MMHG | OXYGEN SATURATION: 95 % | TEMPERATURE: 98.8 F | HEIGHT: 64 IN | HEART RATE: 89 BPM | WEIGHT: 179.4 LBS | DIASTOLIC BLOOD PRESSURE: 68 MMHG | BODY MASS INDEX: 30.63 KG/M2

## 2019-01-22 DIAGNOSIS — E55.9 VITAMIN D INSUFFICIENCY: ICD-10-CM

## 2019-01-22 DIAGNOSIS — F33.2 SEVERE EPISODE OF RECURRENT MAJOR DEPRESSIVE DISORDER, WITHOUT PSYCHOTIC FEATURES (HCC): ICD-10-CM

## 2019-01-22 DIAGNOSIS — N92.1 MENORRHAGIA WITH IRREGULAR CYCLE: ICD-10-CM

## 2019-01-22 DIAGNOSIS — E89.0 POSTOPERATIVE HYPOTHYROIDISM: ICD-10-CM

## 2019-01-22 DIAGNOSIS — J45.20 MILD INTERMITTENT ASTHMA WITHOUT COMPLICATION: ICD-10-CM

## 2019-01-22 DIAGNOSIS — Z32.02 PREGNANCY EXAMINATION OR TEST, NEGATIVE RESULT: ICD-10-CM

## 2019-01-22 PROBLEM — Z30.42 ENCOUNTER FOR SURVEILLANCE OF INJECTABLE CONTRACEPTIVE: Status: RESOLVED | Noted: 2018-10-01 | Resolved: 2019-01-22

## 2019-01-22 LAB
25(OH)D3 SERPL-MCNC: 22 NG/ML (ref 30–100)
ALBUMIN SERPL BCP-MCNC: 4.1 G/DL (ref 3.2–4.9)
ALBUMIN/GLOB SERPL: 1.7 G/DL
ALP SERPL-CCNC: 65 U/L (ref 30–99)
ALT SERPL-CCNC: 12 U/L (ref 2–50)
ANION GAP SERPL CALC-SCNC: 7 MMOL/L (ref 0–11.9)
AST SERPL-CCNC: 16 U/L (ref 12–45)
BASOPHILS # BLD AUTO: 0.6 % (ref 0–1.8)
BASOPHILS # BLD: 0.03 K/UL (ref 0–0.12)
BILIRUB SERPL-MCNC: 0.9 MG/DL (ref 0.1–1.5)
BUN SERPL-MCNC: 16 MG/DL (ref 8–22)
CALCIUM SERPL-MCNC: 9.3 MG/DL (ref 8.5–10.5)
CHLORIDE SERPL-SCNC: 107 MMOL/L (ref 96–112)
CO2 SERPL-SCNC: 26 MMOL/L (ref 20–33)
CREAT SERPL-MCNC: 0.73 MG/DL (ref 0.5–1.4)
EOSINOPHIL # BLD AUTO: 0.13 K/UL (ref 0–0.51)
EOSINOPHIL NFR BLD: 2.4 % (ref 0–6.9)
ERYTHROCYTE [DISTWIDTH] IN BLOOD BY AUTOMATED COUNT: 43.2 FL (ref 35.9–50)
GLOBULIN SER CALC-MCNC: 2.4 G/DL (ref 1.9–3.5)
GLUCOSE SERPL-MCNC: 91 MG/DL (ref 65–99)
HCT VFR BLD AUTO: 45.9 % (ref 37–47)
HGB BLD-MCNC: 15.4 G/DL (ref 12–16)
IMM GRANULOCYTES # BLD AUTO: 0.01 K/UL (ref 0–0.11)
IMM GRANULOCYTES NFR BLD AUTO: 0.2 % (ref 0–0.9)
INT CON NEG: NEGATIVE
INT CON POS: POSITIVE
LYMPHOCYTES # BLD AUTO: 1.74 K/UL (ref 1–4.8)
LYMPHOCYTES NFR BLD: 32 % (ref 22–41)
MCH RBC QN AUTO: 31.2 PG (ref 27–33)
MCHC RBC AUTO-ENTMCNC: 33.6 G/DL (ref 33.6–35)
MCV RBC AUTO: 92.9 FL (ref 81.4–97.8)
MONOCYTES # BLD AUTO: 0.36 K/UL (ref 0–0.85)
MONOCYTES NFR BLD AUTO: 6.6 % (ref 0–13.4)
NEUTROPHILS # BLD AUTO: 3.17 K/UL (ref 2–7.15)
NEUTROPHILS NFR BLD: 58.2 % (ref 44–72)
NRBC # BLD AUTO: 0 K/UL
NRBC BLD-RTO: 0 /100 WBC
PLATELET # BLD AUTO: 224 K/UL (ref 164–446)
PMV BLD AUTO: 10.4 FL (ref 9–12.9)
POC URINE PREGNANCY TEST: NORMAL
POTASSIUM SERPL-SCNC: 3.7 MMOL/L (ref 3.6–5.5)
PROT SERPL-MCNC: 6.5 G/DL (ref 6–8.2)
RBC # BLD AUTO: 4.94 M/UL (ref 4.2–5.4)
SODIUM SERPL-SCNC: 140 MMOL/L (ref 135–145)
T4 FREE SERPL-MCNC: 0.88 NG/DL (ref 0.53–1.43)
TSH SERPL DL<=0.005 MIU/L-ACNC: 6.49 UIU/ML (ref 0.38–5.33)
WBC # BLD AUTO: 5.4 K/UL (ref 4.8–10.8)

## 2019-01-22 PROCEDURE — 80053 COMPREHEN METABOLIC PANEL: CPT

## 2019-01-22 PROCEDURE — 84443 ASSAY THYROID STIM HORMONE: CPT

## 2019-01-22 PROCEDURE — 99214 OFFICE O/P EST MOD 30 MIN: CPT | Performed by: INTERNAL MEDICINE

## 2019-01-22 PROCEDURE — 84439 ASSAY OF FREE THYROXINE: CPT

## 2019-01-22 PROCEDURE — 85025 COMPLETE CBC W/AUTO DIFF WBC: CPT

## 2019-01-22 PROCEDURE — 82306 VITAMIN D 25 HYDROXY: CPT

## 2019-01-22 PROCEDURE — 36415 COLL VENOUS BLD VENIPUNCTURE: CPT

## 2019-01-22 PROCEDURE — 81025 URINE PREGNANCY TEST: CPT | Performed by: INTERNAL MEDICINE

## 2019-01-22 RX ORDER — LORATADINE 10 MG/1
10 TABLET ORAL
Refills: 0 | COMMUNITY
Start: 2018-12-07 | End: 2022-03-23 | Stop reason: SDUPTHER

## 2019-01-22 RX ORDER — M-VIT,TX,IRON,MINS/CALC/FOLIC 27MG-0.4MG
1 TABLET ORAL DAILY
COMMUNITY
End: 2023-05-11

## 2019-01-22 RX ORDER — HYDROXYZINE HYDROCHLORIDE 10 MG/1
10 TABLET, FILM COATED ORAL 3 TIMES DAILY PRN
COMMUNITY
End: 2019-11-07 | Stop reason: SDUPTHER

## 2019-01-22 RX ORDER — ERGOCALCIFEROL 1.25 MG/1
50000 CAPSULE ORAL
Qty: 12 CAP | Refills: 3 | Status: SHIPPED | OUTPATIENT
Start: 2019-01-22 | End: 2019-03-04

## 2019-01-22 RX ORDER — BUPROPION HYDROCHLORIDE 75 MG/1
75 TABLET ORAL 2 TIMES DAILY
Qty: 180 TAB | Refills: 3 | Status: SHIPPED | OUTPATIENT
Start: 2019-01-22 | End: 2019-03-04

## 2019-01-22 RX ORDER — LEVOTHYROXINE SODIUM 175 UG/1
175 TABLET ORAL
Qty: 90 TAB | Refills: 3 | Status: SHIPPED | OUTPATIENT
Start: 2019-01-22 | End: 2019-12-30 | Stop reason: SDUPTHER

## 2019-01-22 ASSESSMENT — PATIENT HEALTH QUESTIONNAIRE - PHQ9
SUM OF ALL RESPONSES TO PHQ QUESTIONS 1-9: 9
5. POOR APPETITE OR OVEREATING: 0 - NOT AT ALL
CLINICAL INTERPRETATION OF PHQ2 SCORE: 1

## 2019-01-22 NOTE — LETTER
[facility]  Margret Titus M.D.  [address]  [place] [address]  Fax: [phone]   Authorization for Release/Disclosure of   Protected Health Information   Name: ANNAMARIA NAILS : 1985 SSN: [id number]   Address: [facility] [address]  [place] [address] Phone:    [phone] (home)    I authorize the entity listed below to release/disclose the PHI below to:   Replaced by [facility]/Margret Titus M.D. and Margret Titus M.D.   Provider or Entity Name:  Dr. Alesia Tan    Address   [facility], [facility], [facility]   Phone:      Fax:     Reason for request: continuity of care   Information to be released:    [  ] LAST COLONOSCOPY,  including any PATH REPORT and follow-up  [  ] LAST FIT/COLOGUARD RESULT [  ] LAST DEXA  [  ] LAST MAMMOGRAM  [XXXXX] LAST PAP  [  ] LAST LABS [  ] RETINA EXAM REPORT  [  ] IMMUNIZATION RECORDS  [  ] Release all info      [  ] Check here and initial the line next to each item to release ALL health information INCLUDING  _____ Care and treatment for drug and / or alcohol abuse  _____ HIV testing, infection status, or AIDS  _____ Genetic Testing    DATES OF SERVICE OR TIME PERIOD TO BE DISCLOSED: _____________  I understand and acknowledge that:  * This Authorization may be revoked at any time by you in writing, except if your health information has already been used or disclosed.  * Your health information that will be used or disclosed as a result of you signing this authorization could be re-disclosed by the recipient. If this occurs, your re-disclosed health information may no longer be protected by State or Federal laws.  * You may refuse to sign this Authorization. Your refusal will not affect your ability to obtain treatment.  * This Authorization becomes effective upon signing and will  on (date) __________.      If no date is indicated, this Authorization will  one (1) year from the signature date.    Name: Annamaria Nails    Signature:   Date:     2019       PLEASE FAX  REQUESTED RECORDS BACK TO: [phone]

## 2019-01-23 NOTE — ASSESSMENT & PLAN NOTE
Patient stopped breast-feeding in November 2018.  She started having heavy menstruation in December and stated that she stopped menstrual bleeding for 5-day and recurred again til now.  She saw her gynecologist, Dr. Krishnamurthy yesterday and gynecologist prescribed progesterone for her to control heavy menstruation.  She has pre-operated assessment appointment with gynecologist on 2/22/19.  She plans to do tubal ligation, 1.1 cm complicated right ovarian cyst removed, hysteroscopy and possible endometrial ablation if she still has heavy menstruation with gynecologist on March 6, 2019. Her CBC on 1/22/19 showed normal hemoglobin and hematocrit.

## 2019-01-23 NOTE — PROGRESS NOTES
Subjective:   Annamaria Hernandez is a 33 y.o. female here today for evaluation and management of:      Vitamin D insufficiency  Patient has low vitamin D level at 22 on 1/22/19.  She stated that she is taking over-the-counter vitamin D 3000 units to 4000 units daily.  However her vitamin D level does not improve with over-the-counter supplement.  She reported feeling tired as well.  I advised patient to take ergocalciferol 50,000 units once a week and she agreed to take it.    Severe episode of recurrent major depressive disorder, without psychotic features (HCC)  Patient reported that she has severe is depressed mood in November 2018 and she was presented to [facility] for treatment.  She stated that she was discharged with buspirone, prazosin and Mirtazapine by psychiatrist.  She stated that she only took all these medication for 1 month and she stopped taking all of them for about 1 month as she felt severely tired and fatigued from taking anti-depressant and psychiatrist medication.  Patient reported that she still has mild depressed mood so she would like to try different medication with less side effects.  She follows with [facility] and she states that her mood improved a lot with counseling.  She wanted to refer to [facility] for psychiatrist and psychologist.  She denied suicidal ideation or plan or homicidal ideation or plan.  Patient stated that she did not have bipolar or many years.  Her father and sister have bipolar.    Postoperative hypothyroidism  Patient is taking levothyroxine 150 mcg 1 tablet 6 days a week and half tablet once a week.  She stated that she is taking levothyroxine on empty stomach.  She reported having a lot of hair loss, heavy menstruation and fatigue lately.  Recent thyroid function tests done in this morning showed elevated TSH.  I discussed blood test result with patient.  I discussed with patient to increase the dose of  levothyroxine to 175 mcg to take every morning on empty stomach.  She agreed with the plan.    Results for MARIA M NAILS (MRN [id number]) as of 1/22/2019 20:54   Ref. Range 1/22/2019 06:06   TSH Latest Ref Range: 0.380 - 5.330 uIU/mL 6.490 (H)   Free T-4 Latest Ref Range: 0.53 - 1.43 ng/dL 0.88       Menorrhagia with irregular cycle  Patient stopped breast-feeding in November 2018.  She started having heavy menstruation in December and stated that she stopped menstrual bleeding for 5-day and recurred again til now.  She saw her gynecologist, Dr. Krishnamurthy yesterday and gynecologist prescribed progesterone for her to control heavy menstruation.  She has pre-operated assessment appointment with gynecologist on 2/22/19.  She plans to do tubal ligation, 1.1 cm complicated right ovarian cyst removed, hysteroscopy and possible endometrial ablation if she still has heavy menstruation with gynecologist on March 6, 2019. Her CBC on 1/22/19 showed normal hemoglobin and hematocrit.         Current medicines (including changes today)  Current Outpatient Prescriptions   Medication Sig Dispense Refill   • loratadine (CLARITIN) 10 MG Tab Take 10 mg by mouth every day.  0   • hydrOXYzine HCl (ATARAX) 10 MG Tab Take 10 mg by mouth 3 times a day as needed for Itching.     • therapeutic multivitamin-minerals (THERAGRAN-M) Tab Take 1 Tab by mouth every day.     • levothyroxine (SYNTHROID) 175 MCG Tab Take 1 Tab by mouth Every morning on an empty stomach. 90 Tab 3   • ergocalciferol (DRISDOL) 10599 UNIT capsule Take 1 Cap by mouth every 7 days. 12 Cap 3   • buPROPion (WELLBUTRIN) 75 MG Tab Take 1 Tab by mouth 2 times a day. 180 Tab 3   • Alum & Mag Hydroxide-Simeth (MBX) Suspension Take 5 mL by mouth every 6 hours as needed. 120 mL 0   • albuterol (VENTOLIN HFA) 108 (90 Base) MCG/ACT Aero Soln inhalation aerosol Inhale 2 Puffs by mouth every 6 hours as needed for Shortness of Breath. 18 Inhaler 3   • raNITidine (ZANTAC) 150 MG Tab  "Take 1 Tab by mouth 2 times a day. 180 Tab 3   • montelukast (SINGULAIR) 10 MG Tab Take 1 Tab by mouth every day. TAKE 1 TAB BY MOUTH EVERY DAY. 90 Tab 3   • Diclofenac Sodium (VOLTAREN) 1 % Gel Apply 4 gm to effected areas qid prn. 3 Tube 3   • Prenatal MV-Min-Fe Fum-FA-DHA (PRENATAL 1 PO) Take 1 Tab by mouth every day.       No current facility-administered medications for this visit.      She  has a past medical history of Anemia; Hashimoto disease; Liver disease; and Renal disorder.    ROS   No chest pain, no shortness of breath, no abdominal pain       Objective:     Blood pressure 110/68, pulse 89, temperature 37.1 °C (98.8 °F), temperature source Temporal, height 1.626 m (5' 4\"), weight 81.4 kg (179 lb 6.4 oz), last menstrual period 01/22/2019, SpO2 95 %, not currently breastfeeding. Body mass index is 30.79 kg/m².   Physical Exam:  General: Alert, oriented and no acute distress.  Eye contact is good, speech goal directed, affect calm  HEENT: conjunctiva non-injected, sclera non-icteric.  Oral mucous membranes pink and moist with no lesions.  Pinna normal.   Lungs: Normal respiratory effort, clear to auscultation bilaterally with good excursion.  CV: regular rate and rhythm. No murmurs.  Abdomen: soft, non distended, nontender, Bowel sound normal.  Ext: no edema, color normal, vascularity normal, temperature normal        Assessment and Plan:   The following treatment plan was discussed     1. Postoperative hypothyroidism  - Not well controlled.  Increased the dose of levothyroxine from 150mcg to 175 mcg.  Advised patient to take it every morning on empty stomach.  I reviewed potential side effects of levothyroxine with patient.  Patient is advised to return to clinic sooner if she has any signs or symptoms of hypo-or hyperthyroidism.  Will recheck lab in 3 months.  She agreed with the plan.- levothyroxine (SYNTHROID) 175 MCG Tab; Take 1 Tab by mouth Every morning on an empty stomach.  Dispense: 90 Tab; " Refill: 3  - CBC WITH DIFFERENTIAL; Future  - TSH; Future  - FREE THYROXINE; Future    2. Vitamin D insufficiency  - Not well controlled.  Prescribed ergocalciferol 50,000 units to take once a week.  Discussed the risks and benefits of ergocalciferol with patient.  Recheck lab in 3 months.  - ergocalciferol (DRISDOL) 95435 UNIT capsule; Take 1 Cap by mouth every 7 days.  Dispense: 12 Cap; Refill: 3  - VITAMIN D,25 HYDROXY; Future    3. Menorrhagia with irregular cycle  - Patient was seen by gynecologist yesterday.  Her gynecologist prescribed progesterone yesterday to control her menstrual blood flow.  She has not picked up medication yet.  Patient is advised to take medication as prescribed by gynecologist.  She is advised to contact gynecologist if her menstruation does not improve with progesterone.  She has normal CBC on 1/22/19.  She is advised to follow-up with gynecologist as scheduled.  - POCT Pregnancy  - CBC WITH DIFFERENTIAL; Future  - COMP METABOLIC PANEL; Future    4. Pregnancy examination or test, negative result  - Patient has irregular menstrual blood flow and started having heavy blood flow since December 2018.  I ordered POCT urine pregnancy test to rule out pregnancy.  Urine pregnancy test in clinic today was negative.  - POCT Pregnancy    5. Severe episode of recurrent major depressive disorder, without psychotic features (HCC)  - Improved with counseling by psychologist.  She requested to take different antidepressant with less side effects.  I discussed with patient to try Wellbutrin 75 mg twice daily.  She wanted to refer to [facility].  - Discussed with patient regarding the use and side effects of Wellbutrin as well as black box warning of suicidality risk with medication. Patient verbally understands. Recommend to call 911 or go to ER if patient has suicidal ideation or plan.  - buPROPion (WELLBUTRIN) 75 MG Tab; Take 1 Tab by mouth 2 times a day.  Dispense: 180  Tab; Refill: 3  - REFERRAL TO BEHAVIORAL HEALTH    6. Health Maintenance   - Patient declined to receive influenza vaccine even after discussion of importance of immunization.  Patient has Pap smear with gynecologist last year.  We will request Pap smear report from gynecologist.      Followup: Return in about 3 months (around 4/22/2019), or if symptoms worsen or fail to improve, for Hypothyroid, Vitamin D insufficiency, Depression, Lab review.      Please note that this dictation was created using voice recognition software. I have made every reasonable attempt to correct obvious errors, but I expect that there may have unintended errors in text, spelling, punctuation, or grammar that I did not discover.

## 2019-01-23 NOTE — ASSESSMENT & PLAN NOTE
Patient reported that she has severe is depressed mood in November 2018 and she was presented to [facility] for treatment.  She stated that she was discharged with buspirone, prazosin and Mirtazapine by psychiatrist.  She stated that she only took all these medication for 1 month and she stopped taking all of them for about 1 month as she felt severely tired and fatigued from taking anti-depressant and psychiatrist medication.  Patient reported that she still has mild depressed mood so she would like to try different medication with less side effects.  She follows with psychologist in [facility] and she states that her mood improved a lot with counseling.  She wanted to refer to [facility] for psychiatrist and psychologist.  She denied suicidal ideation or plan or homicidal ideation or plan.  Patient stated that she did not have bipolar or many years.  Her father and sister have bipolar.

## 2019-01-23 NOTE — ASSESSMENT & PLAN NOTE
Patient has low vitamin D level at 22 on 1/22/19.  She stated that she is taking over-the-counter vitamin D 3000 units to 4000 units daily.  However her vitamin D level does not improve with over-the-counter supplement.  She reported feeling tired as well.  I advised patient to take ergocalciferol 50,000 units once a week and she agreed to take it.

## 2019-01-23 NOTE — ASSESSMENT & PLAN NOTE
Patient is taking levothyroxine 150 mcg 1 tablet 6 days a week and half tablet once a week.  She stated that she is taking levothyroxine on empty stomach.  She reported having a lot of hair loss, heavy menstruation and fatigue lately.  Recent thyroid function tests done in this morning showed elevated TSH.  I discussed blood test result with patient.  I discussed with patient to increase the dose of levothyroxine to 175 mcg to take every morning on empty stomach.  She agreed with the plan.    Results for JAQUI MARIA M PARADA (MRN [id number]) as of 1/22/2019 20:54   Ref. Range 1/22/2019 06:06   TSH Latest Ref Range: 0.380 - 5.330 uIU/mL 6.490 (H)   Free T-4 Latest Ref Range: 0.53 - 1.43 ng/dL 0.88

## 2019-01-28 ENCOUNTER — TELEPHONE (OUTPATIENT)
Dept: MEDICAL GROUP | Age: 34
End: 2019-01-28

## 2019-01-28 DIAGNOSIS — N92.1 MENORRHAGIA WITH IRREGULAR CYCLE: ICD-10-CM

## 2019-01-28 NOTE — TELEPHONE ENCOUNTER
Please advise patient that she can take multivitamins with iron supplements once a day.  Recent blood test on 1/22/19 did not show anemia.  I will order iron for her if she want to do it.  She needs to follow with gynecologist to change her medication.  She reported that she has appointment with gynecologist for ablation.  Please advise her to follow with gynecologist.  Iron level was already ordered.  She can do her iron tests together with other blood test in March or April before follow-up appointment on 4/23/19.  She just had blood tests on 1/22/19 and she did not require to repeat blood test right away.  However if she wants to do CBC and iron in 4-6 weeks, she can do so.    Margret Titus M.D.

## 2019-01-28 NOTE — TELEPHONE ENCOUNTER
Patient stated that she is still bleeding, she is cramping, and its a heavy flow.     Patient would like to know if she can get a lab for iron deficiency since she was previously anemic.     OB/GYN put her on XAVIER since Tuesday, and the bleeding is not helping. She has called her GYN to change medications and left voicemail as well.      Please advise.

## 2019-03-04 DIAGNOSIS — Z01.812 PRE-OPERATIVE LABORATORY EXAMINATION: ICD-10-CM

## 2019-03-04 LAB
ANION GAP SERPL CALC-SCNC: 10 MMOL/L (ref 0–11.9)
BASOPHILS # BLD AUTO: 0.6 % (ref 0–1.8)
BASOPHILS # BLD: 0.03 K/UL (ref 0–0.12)
BUN SERPL-MCNC: 15 MG/DL (ref 8–22)
CALCIUM SERPL-MCNC: 8.8 MG/DL (ref 8.5–10.5)
CHLORIDE SERPL-SCNC: 108 MMOL/L (ref 96–112)
CO2 SERPL-SCNC: 21 MMOL/L (ref 20–33)
CREAT SERPL-MCNC: 0.59 MG/DL (ref 0.5–1.4)
EOSINOPHIL # BLD AUTO: 0.09 K/UL (ref 0–0.51)
EOSINOPHIL NFR BLD: 1.8 % (ref 0–6.9)
ERYTHROCYTE [DISTWIDTH] IN BLOOD BY AUTOMATED COUNT: 39.9 FL (ref 35.9–50)
GLUCOSE SERPL-MCNC: 98 MG/DL (ref 65–99)
HAV IGM SERPL QL IA: NEGATIVE
HBV CORE IGM SER QL: NEGATIVE
HBV SURFACE AG SER QL: NEGATIVE
HCG SERPL QL: NEGATIVE
HCT VFR BLD AUTO: 46.9 % (ref 37–47)
HCV AB SER QL: NEGATIVE
HGB BLD-MCNC: 15.7 G/DL (ref 12–16)
HIV 1+2 AB+HIV1 P24 AG SERPL QL IA: NON REACTIVE
IMM GRANULOCYTES # BLD AUTO: 0.01 K/UL (ref 0–0.11)
IMM GRANULOCYTES NFR BLD AUTO: 0.2 % (ref 0–0.9)
LYMPHOCYTES # BLD AUTO: 1.71 K/UL (ref 1–4.8)
LYMPHOCYTES NFR BLD: 33.6 % (ref 22–41)
MCH RBC QN AUTO: 30.7 PG (ref 27–33)
MCHC RBC AUTO-ENTMCNC: 33.5 G/DL (ref 33.6–35)
MCV RBC AUTO: 91.8 FL (ref 81.4–97.8)
MONOCYTES # BLD AUTO: 0.49 K/UL (ref 0–0.85)
MONOCYTES NFR BLD AUTO: 9.6 % (ref 0–13.4)
NEUTROPHILS # BLD AUTO: 2.76 K/UL (ref 2–7.15)
NEUTROPHILS NFR BLD: 54.2 % (ref 44–72)
NRBC # BLD AUTO: 0 K/UL
NRBC BLD-RTO: 0 /100 WBC
PLATELET # BLD AUTO: 235 K/UL (ref 164–446)
PMV BLD AUTO: 10.6 FL (ref 9–12.9)
POTASSIUM SERPL-SCNC: 4 MMOL/L (ref 3.6–5.5)
RBC # BLD AUTO: 5.11 M/UL (ref 4.2–5.4)
SODIUM SERPL-SCNC: 139 MMOL/L (ref 135–145)
TREPONEMA PALLIDUM IGG+IGM AB [PRESENCE] IN SERUM OR PLASMA BY IMMUNOASSAY: NON REACTIVE
WBC # BLD AUTO: 5.1 K/UL (ref 4.8–10.8)

## 2019-03-04 PROCEDURE — 84703 CHORIONIC GONADOTROPIN ASSAY: CPT

## 2019-03-04 PROCEDURE — 86780 TREPONEMA PALLIDUM: CPT

## 2019-03-04 PROCEDURE — 80048 BASIC METABOLIC PNL TOTAL CA: CPT

## 2019-03-04 PROCEDURE — 85025 COMPLETE CBC W/AUTO DIFF WBC: CPT

## 2019-03-04 PROCEDURE — 86694 HERPES SIMPLEX NES ANTBDY: CPT | Mod: 91

## 2019-03-04 PROCEDURE — 36415 COLL VENOUS BLD VENIPUNCTURE: CPT

## 2019-03-04 PROCEDURE — 87389 HIV-1 AG W/HIV-1&-2 AB AG IA: CPT

## 2019-03-04 PROCEDURE — 80074 ACUTE HEPATITIS PANEL: CPT

## 2019-03-04 RX ORDER — CETIRIZINE HYDROCHLORIDE 10 MG/1
10 TABLET ORAL 2 TIMES DAILY
COMMUNITY
End: 2021-01-15 | Stop reason: SDUPTHER

## 2019-03-06 ENCOUNTER — HOSPITAL ENCOUNTER (OUTPATIENT)
Facility: MEDICAL CENTER | Age: 34
End: 2019-03-06
Attending: OBSTETRICS & GYNECOLOGY | Admitting: OBSTETRICS & GYNECOLOGY
Payer: MEDICARE

## 2019-03-06 VITALS
OXYGEN SATURATION: 97 % | DIASTOLIC BLOOD PRESSURE: 80 MMHG | HEIGHT: 65 IN | HEART RATE: 82 BPM | TEMPERATURE: 97.4 F | SYSTOLIC BLOOD PRESSURE: 131 MMHG | BODY MASS INDEX: 30.19 KG/M2 | RESPIRATION RATE: 16 BRPM | WEIGHT: 181.22 LBS

## 2019-03-06 DIAGNOSIS — G89.18 PAIN FOLLOWING SURGERY OR PROCEDURE: ICD-10-CM

## 2019-03-06 LAB
HSV1+2 IGG SER IA-ACNC: >22.4 IV
HSV1+2 IGM SER IA-ACNC: 0.56 IV
PATHOLOGY CONSULT NOTE: NORMAL

## 2019-03-06 PROCEDURE — 700101 HCHG RX REV CODE 250

## 2019-03-06 PROCEDURE — 160029 HCHG SURGERY MINUTES - 1ST 30 MINS LEVEL 4: Performed by: OBSTETRICS & GYNECOLOGY

## 2019-03-06 PROCEDURE — 88305 TISSUE EXAM BY PATHOLOGIST: CPT

## 2019-03-06 PROCEDURE — 500002 HCHG ADHESIVE, DERMABOND: Performed by: OBSTETRICS & GYNECOLOGY

## 2019-03-06 PROCEDURE — A6402 STERILE GAUZE <= 16 SQ IN: HCPCS | Performed by: OBSTETRICS & GYNECOLOGY

## 2019-03-06 PROCEDURE — 160009 HCHG ANES TIME/MIN: Performed by: OBSTETRICS & GYNECOLOGY

## 2019-03-06 PROCEDURE — 501582 HCHG TROCAR, THRD BLADED: Performed by: OBSTETRICS & GYNECOLOGY

## 2019-03-06 PROCEDURE — 700111 HCHG RX REV CODE 636 W/ 250 OVERRIDE (IP)

## 2019-03-06 PROCEDURE — 502704 HCHG DEVICE, LIGASURE IMPACT: Performed by: OBSTETRICS & GYNECOLOGY

## 2019-03-06 PROCEDURE — 502240 HCHG MISC OR SUPPLY RC 0272: Performed by: OBSTETRICS & GYNECOLOGY

## 2019-03-06 PROCEDURE — 500886 HCHG PACK, LAPAROSCOPY: Performed by: OBSTETRICS & GYNECOLOGY

## 2019-03-06 PROCEDURE — A6404 STERILE GAUZE > 48 SQ IN: HCPCS | Performed by: OBSTETRICS & GYNECOLOGY

## 2019-03-06 PROCEDURE — 160035 HCHG PACU - 1ST 60 MINS PHASE I: Performed by: OBSTETRICS & GYNECOLOGY

## 2019-03-06 PROCEDURE — 501838 HCHG SUTURE GENERAL: Performed by: OBSTETRICS & GYNECOLOGY

## 2019-03-06 PROCEDURE — A4338 INDWELLING CATHETER LATEX: HCPCS | Performed by: OBSTETRICS & GYNECOLOGY

## 2019-03-06 PROCEDURE — 160036 HCHG PACU - EA ADDL 30 MINS PHASE I: Performed by: OBSTETRICS & GYNECOLOGY

## 2019-03-06 PROCEDURE — 700102 HCHG RX REV CODE 250 W/ 637 OVERRIDE(OP): Performed by: ANESTHESIOLOGY

## 2019-03-06 PROCEDURE — A9270 NON-COVERED ITEM OR SERVICE: HCPCS | Performed by: ANESTHESIOLOGY

## 2019-03-06 PROCEDURE — 160025 RECOVERY II MINUTES (STATS): Performed by: OBSTETRICS & GYNECOLOGY

## 2019-03-06 PROCEDURE — 502587 HCHG PACK, D&C: Performed by: OBSTETRICS & GYNECOLOGY

## 2019-03-06 PROCEDURE — 160041 HCHG SURGERY MINUTES - EA ADDL 1 MIN LEVEL 4: Performed by: OBSTETRICS & GYNECOLOGY

## 2019-03-06 PROCEDURE — 88302 TISSUE EXAM BY PATHOLOGIST: CPT

## 2019-03-06 PROCEDURE — 700111 HCHG RX REV CODE 636 W/ 250 OVERRIDE (IP): Performed by: ANESTHESIOLOGY

## 2019-03-06 PROCEDURE — 160046 HCHG PACU - 1ST 60 MINS PHASE II: Performed by: OBSTETRICS & GYNECOLOGY

## 2019-03-06 PROCEDURE — 160002 HCHG RECOVERY MINUTES (STAT): Performed by: OBSTETRICS & GYNECOLOGY

## 2019-03-06 PROCEDURE — 160048 HCHG OR STATISTICAL LEVEL 1-5: Performed by: OBSTETRICS & GYNECOLOGY

## 2019-03-06 RX ORDER — OXYCODONE HCL 5 MG/5 ML
10 SOLUTION, ORAL ORAL
Status: COMPLETED | OUTPATIENT
Start: 2019-03-06 | End: 2019-03-06

## 2019-03-06 RX ORDER — ONDANSETRON 2 MG/ML
4 INJECTION INTRAMUSCULAR; INTRAVENOUS
Status: COMPLETED | OUTPATIENT
Start: 2019-03-06 | End: 2019-03-06

## 2019-03-06 RX ORDER — HYDRALAZINE HYDROCHLORIDE 20 MG/ML
5 INJECTION INTRAMUSCULAR; INTRAVENOUS
Status: DISCONTINUED | OUTPATIENT
Start: 2019-03-06 | End: 2019-03-06 | Stop reason: HOSPADM

## 2019-03-06 RX ORDER — LORAZEPAM 2 MG/ML
0.5 INJECTION INTRAMUSCULAR
Status: DISCONTINUED | OUTPATIENT
Start: 2019-03-06 | End: 2019-03-06 | Stop reason: HOSPADM

## 2019-03-06 RX ORDER — PROMETHAZINE HYDROCHLORIDE 25 MG/1
12.5 SUPPOSITORY RECTAL EVERY 4 HOURS PRN
Status: DISCONTINUED | OUTPATIENT
Start: 2019-03-06 | End: 2019-03-06 | Stop reason: HOSPADM

## 2019-03-06 RX ORDER — HALOPERIDOL 5 MG/ML
1 INJECTION INTRAMUSCULAR
Status: DISCONTINUED | OUTPATIENT
Start: 2019-03-06 | End: 2019-03-06 | Stop reason: HOSPADM

## 2019-03-06 RX ORDER — MEPERIDINE HYDROCHLORIDE 25 MG/ML
12.5 INJECTION INTRAMUSCULAR; INTRAVENOUS; SUBCUTANEOUS
Status: DISCONTINUED | OUTPATIENT
Start: 2019-03-06 | End: 2019-03-06 | Stop reason: HOSPADM

## 2019-03-06 RX ORDER — SODIUM CHLORIDE, SODIUM LACTATE, POTASSIUM CHLORIDE, CALCIUM CHLORIDE 600; 310; 30; 20 MG/100ML; MG/100ML; MG/100ML; MG/100ML
INJECTION, SOLUTION INTRAVENOUS ONCE
Status: COMPLETED | OUTPATIENT
Start: 2019-03-06 | End: 2019-03-06

## 2019-03-06 RX ORDER — METRONIDAZOLE 250 MG/1
250 TABLET ORAL 3 TIMES DAILY
Status: ON HOLD | COMMUNITY
End: 2019-03-06

## 2019-03-06 RX ORDER — HYDROMORPHONE HYDROCHLORIDE 1 MG/ML
0.2 INJECTION, SOLUTION INTRAMUSCULAR; INTRAVENOUS; SUBCUTANEOUS
Status: DISCONTINUED | OUTPATIENT
Start: 2019-03-06 | End: 2019-03-06 | Stop reason: HOSPADM

## 2019-03-06 RX ORDER — GABAPENTIN 300 MG/1
600 CAPSULE ORAL ONCE
Status: COMPLETED | OUTPATIENT
Start: 2019-03-06 | End: 2019-03-06

## 2019-03-06 RX ORDER — CELECOXIB 200 MG/1
400 CAPSULE ORAL ONCE
Status: COMPLETED | OUTPATIENT
Start: 2019-03-06 | End: 2019-03-06

## 2019-03-06 RX ORDER — OXYCODONE HYDROCHLORIDE AND ACETAMINOPHEN 5; 325 MG/1; MG/1
1-2 TABLET ORAL EVERY 4 HOURS PRN
Qty: 30 TAB | Refills: 0 | Status: SHIPPED | OUTPATIENT
Start: 2019-03-06 | End: 2019-03-13

## 2019-03-06 RX ORDER — ACETAMINOPHEN 500 MG
1000 TABLET ORAL ONCE
Status: COMPLETED | OUTPATIENT
Start: 2019-03-06 | End: 2019-03-06

## 2019-03-06 RX ORDER — BUPIVACAINE HYDROCHLORIDE AND EPINEPHRINE 2.5; 5 MG/ML; UG/ML
INJECTION, SOLUTION INFILTRATION; PERINEURAL
Status: DISCONTINUED | OUTPATIENT
Start: 2019-03-06 | End: 2019-03-06 | Stop reason: HOSPADM

## 2019-03-06 RX ORDER — OXYCODONE HCL 5 MG/5 ML
5 SOLUTION, ORAL ORAL
Status: COMPLETED | OUTPATIENT
Start: 2019-03-06 | End: 2019-03-06

## 2019-03-06 RX ORDER — DIPHENHYDRAMINE HYDROCHLORIDE 50 MG/ML
12.5 INJECTION INTRAMUSCULAR; INTRAVENOUS
Status: DISCONTINUED | OUTPATIENT
Start: 2019-03-06 | End: 2019-03-06 | Stop reason: HOSPADM

## 2019-03-06 RX ORDER — HYDROMORPHONE HYDROCHLORIDE 1 MG/ML
0.1 INJECTION, SOLUTION INTRAMUSCULAR; INTRAVENOUS; SUBCUTANEOUS
Status: DISCONTINUED | OUTPATIENT
Start: 2019-03-06 | End: 2019-03-06 | Stop reason: HOSPADM

## 2019-03-06 RX ORDER — SIMETHICONE 80 MG
80 TABLET,CHEWABLE ORAL EVERY 8 HOURS PRN
Status: DISCONTINUED | OUTPATIENT
Start: 2019-03-06 | End: 2019-03-06 | Stop reason: HOSPADM

## 2019-03-06 RX ORDER — HYDROMORPHONE HYDROCHLORIDE 1 MG/ML
0.4 INJECTION, SOLUTION INTRAMUSCULAR; INTRAVENOUS; SUBCUTANEOUS
Status: DISCONTINUED | OUTPATIENT
Start: 2019-03-06 | End: 2019-03-06 | Stop reason: HOSPADM

## 2019-03-06 RX ORDER — MIDAZOLAM HYDROCHLORIDE 1 MG/ML
INJECTION INTRAMUSCULAR; INTRAVENOUS
Status: DISCONTINUED
Start: 2019-03-06 | End: 2019-03-06 | Stop reason: HOSPADM

## 2019-03-06 RX ORDER — SODIUM CHLORIDE, SODIUM LACTATE, POTASSIUM CHLORIDE, CALCIUM CHLORIDE 600; 310; 30; 20 MG/100ML; MG/100ML; MG/100ML; MG/100ML
INJECTION, SOLUTION INTRAVENOUS CONTINUOUS
Status: DISCONTINUED | OUTPATIENT
Start: 2019-03-06 | End: 2019-03-06 | Stop reason: HOSPADM

## 2019-03-06 RX ADMIN — CELECOXIB 400 MG: 200 CAPSULE ORAL at 07:36

## 2019-03-06 RX ADMIN — FENTANYL CITRATE 50 MCG: 50 INJECTION, SOLUTION INTRAMUSCULAR; INTRAVENOUS at 10:16

## 2019-03-06 RX ADMIN — FENTANYL CITRATE 50 MCG: 50 INJECTION, SOLUTION INTRAMUSCULAR; INTRAVENOUS at 10:30

## 2019-03-06 RX ADMIN — GABAPENTIN 600 MG: 300 CAPSULE ORAL at 07:36

## 2019-03-06 RX ADMIN — SODIUM CHLORIDE, SODIUM LACTATE, POTASSIUM CHLORIDE, CALCIUM CHLORIDE: 600; 310; 30; 20 INJECTION, SOLUTION INTRAVENOUS at 07:00

## 2019-03-06 RX ADMIN — ONDANSETRON 4 MG: 2 INJECTION INTRAMUSCULAR; INTRAVENOUS at 11:25

## 2019-03-06 RX ADMIN — OXYCODONE HYDROCHLORIDE 10 MG: 5 SOLUTION ORAL at 10:12

## 2019-03-06 RX ADMIN — ACETAMINOPHEN 1000 MG: 500 TABLET, FILM COATED ORAL at 07:36

## 2019-03-06 NOTE — OR NURSING
0955  RECEIVED PATIENT FROM OR.  REPORT FROM DR. RIGGS.  NO AIRWAY IN PLACE.  RESPIRATIONS ARE EVEN AND UNLABORED.  2 LAP STABS TO ABDOMEN COVERED WITH GAUZE AND TEGADERM ARE CDI.  JORDAN PAD PLACED.     1012  MEDICATED WITH PO OXYCODONE FOR C/O ABDOMINAL PAIN 7.    1016  MEDICATED WITH IV FENTANYL.  WARM BLANKETS APPLIED FOR COMFORT.      1030  MEDICATED WITH IV FENTANYL FOR C/O ABDOMINAL PAIN 7.    1106  SISTER TOMASZ TO BEDSIDE.      1125  MEDICATED WITH IV ZOFRAN FOR C/O NAUSEA.    1227  UP TO THE BATHROOM.    1240  REPORT TO JOSEPH VACA.

## 2019-03-06 NOTE — OP REPORT
DATE OF SERVICE:  03/06/2019    PREOPERATIVE DIAGNOSES:  1.  Abnormal uterine bleeding.  2.  Multiparous, desires permanent sterilization.  3.  Persistent right ovarian cyst.  4.  Possible endometrial polyp or submucosal fibroid.  5.  Desires conservative surgical management.  No evidence of endometrial   polyp or submucosal fibroid noted.    PROCEDURES PERFORMED:  Examination under anesthesia, pelviscopy, bilateral   salpingectomy, right ovarian cystectomy, placement of Kole, diagnostic   hysteroscopy with  and endometrial curettage.    SURGEON:  Alesia Krishnamurthy MD    ANESTHESIOLOGIST:  Juan Ramon Prado MD    ANESTHESIA:  General endotracheal tube anesthesia and local anesthetic.    SPECIMEN:  Bilateral fallopian tubes, right ovarian cyst and endometrial   curettage.    ESTIMATED BLOOD LOSS:  25 mL    FINDINGS:  On examination under anesthesia, her uterus is anteverted, mobile,   and 7 weeks size, there are no palpable adnexal masses noted.  Her cervix is   dilated 1 cm.  Laparoscopic findings:  Normal upper abdomen, normal bilateral   tubes, normal uterus, normal left ovary, right ovary with a 2x2x2 cm   hemorrhagic ovarian cyst.  Bilateral ureters were visualized.    HYSTEROSCOPIC FINDINGS:  Bilateral tubal ostia are visualized.  No evidence of   endometrial polyps or submucosal fibroids seen.  Her endometrium is plush.    COMPLICATIONS:  None apparent.    INDICATIONS FOR THE PROCEDURE:  The patient is a 34-year-old para 3-0-0-3, not   currently sexually active woman with the last menstrual period of 02/22/2018,   who has had 2 months of abnormal bleeding who is multiparous and desires   permanent sterilization and who has a persistent right ovarian cyst.  The   patient desires conservative surgical management.    DETAILS OF THE PROCEDURE:  The patient was taken to the operating room where   she underwent general endotracheal tube anesthesia.  She was then placed in   the dorsal lithotomy  position in the [facility].  An examination   under anesthesia was performed and the findings are noted as above.  She was   then prepped and draped in the dorsal lithotomy position.  A Saavedra catheter   was placed in her urinary bladder.  A medium Graves speculum was inserted into   the vagina.  The cervix was visualized.  The anterior lip of the cervix was   grasped with an Allis clamp.  The endocervix was already dilated to allow a 6   Alla manipulator to be placed within the uterine cavity.  The Alla manipulator   was placed without difficulty.  The Allis clamp and speculum were removed.    Attention was turned to the laparoscopic portion of the procedure.  A 10 mm   infraumbilical skin incision was made with the scalpel after the instillation   of 0.25% Marcaine with epinephrine.  The incision was carried down to the   level of the fascia.  The fascia was grasped with Kocher clamps, elevated and   the fascial incision was created with Little scissors.  The incision was   extended laterally with Little scissors.  Either side of the fascial incision   was sutured with 0 Vicryl as stay sutures.    The S retractors were placed within the incision.  The peritoneum was   identified, grasped with a Pean clamp, elevated and incised with Metzenbaum   scissors.  The incision was extended laterally with Metzenbaum scissors.    Intra-abdominal entry was confirmed.  Bowel and omentum were identified.  The   Estee trocar was inserted under direct visualization.  The CO2 gas was   connected and the opening pressure was 8 mmHg.  The patient was placed in   Trendelenburg.  The laparoscopic camera was inserted and the patient's pelvis   and abdomen were explored and the findings are noted as above.  A second   trocar site was identified 3 fingerbreadths above the pubic symphysis.  A 5 mm   skin incision was made with the scalpel after the instillation of 0.25%   Marcaine with epinephrine.  The 5 mm trocar was inserted  under direct   visualization.  The Prestige clamp was inserted and the right ureter was   identified.  The right fallopian tube and ovary were identified.  The right   fallopian tube was grasped near the fimbriated ends, elevated and brought to   the midline.  Using the LigaSure, the fallopian tube was cauterized in 3   proximal locations and then incised.  Following along the mesosalpinx   mesovarium, the remaining blood supply and attachments of the fallopian tubes   were cauterized and then incised.  The fallopian tube was cauterized in 3   proximal locations near the isthmus portion of the tube approximately 2 cm   from the cornual region of the uterus.  The specimen was sent to pathology.    The pedicle was identified and found to be hemostatic.  The left fallopian   tube was identified and followed out to the fimbriated end.  The left ureter   was identified coursing through the medial leaf of the broad ligament.  The   fallopian tube was grasped and brought to the midline.  The fallopian tube was   cauterized near its attachments to the ovary in 3 proximal locations and then   incised.  Following along the mesosalpinx mesovarium, the remaining blood   supply and attachments of the fallopian tube were cauterized and then incised.    Approximately 2 cm from the cornual region of the uterus near the isthmic   portion of the fallopian tube.  The fallopian tube was cauterized in 3   proximal locations and then incised.  The specimen was delivered through the 5   mm trocar and sent to pathology drains.  The attention was turned to the   right ovarian cyst.  The ovary was grasped with a Prestige clamp near an   avascular portion.  The ovary was incised with the LigaSure at the site of the   ovarian cyst and blood clot was noted to be extruding from the ovarian cyst.    A portion of the cyst was removed and the ovarian cyst cavity was irrigated   copiously with lactated Ringer solution.  The small areas of active  bleeding   along the cortical region of the ovary were cauterized with the laparoscopic   EndoShears.  Using the monopolar cautery, the bone ovary was irrigated   copiously and Kole was placed on the ovary.    The laparoscopic instruments were removed from the patient's pelvis and   abdomen.  The CO2 gas was released from the patient's abdomen.  The fascia was   reapproximated with 0 Vicryl, subcutaneous tissues were reapproximated with 0   Vicryl and the skin was closed with 4-0 Monocryl.  The 5 mm trocar site skin   was closed with 4-0 Monocryl in a subcuticular fashion.  The wounds were   dressed with benzoin, Steri-Strips, 2x2 and Tegaderm.    Attention was turned to the hysteroscopic portion of the procedure.  The Alla   manipulator was removed without difficulty.  The speculum was inserted.  The   anterior lip of the cervix was grasped with an Allis clamp and a paracervical   block was performed.  The cervix was already dilated to allow the MyoSure   light camera to be inserted under direct visualization.  The MyoSure camera   was inserted and the findings are noted as above.  As there was no obvious   endometrial polyp or submucosal fibroid, I performed a uterine curettage with   a sharp curette.  The uterine cavity was explored with MyoSure camera, the   MyoSure camera was removed and then the endometrial curettage was performed.    The Allis clamp was removed.  The cervix was hemostatic and the speculum was   removed.  The Saavedra catheter was removed.  Approximately 50 mL of clear urine.    The patient was taken out of dorsal lithotomy position.  She was then   extubated and taken to the PACU in stable condition.  Sponge, lap and needle   counts were correct x2.       ____________________________________     MD ANN MAKI / NTS    DD:  03/06/2019 10:08:32  DT:  03/06/2019 10:27:01    D#:  [id number]  Job#:  [id number]

## 2019-03-06 NOTE — DISCHARGE INSTRUCTIONS
ACTIVITY: Rest and take it easy for the first 24 hours.  A responsible adult is recommended to remain with you during that time.  It is normal to feel sleepy.  We encourage you to not do anything that requires balance, judgment or coordination.    MILD FLU-LIKE SYMPTOMS ARE NORMAL. YOU MAY EXPERIENCE GENERALIZED MUSCLE ACHES, THROAT IRRITATION, HEADACHE AND/OR SOME NAUSEA.    FOR 24 HOURS DO NOT:  Drive, operate machinery or run household appliances.  Drink beer or alcoholic beverages.   Make important decisions or sign legal documents.    SPECIAL INSTRUCTIONS: *SEE HYSTEROSCOPY INSTRUCTION SHEET  *Call for a temp >100.4, heavy vaginal bleeding, foul smelling discharge, or if her incision oozes blood, pus, or fluid.  No driving x 2 weeks or while on narcotics, no lifting >20 pounds x 4 weeks, and pelvic rest x 6 weeks.  Ambulate TID.  Call for signs/symptoms of DVT/PE.  DVT IS DEEP VEIN THROMBOSIS   PE IS PULMONARY EMBOLISM*    DIET: To avoid nausea, slowly advance diet as tolerated, avoiding spicy or greasy foods for the first day.  Add more substantial food to your diet according to your physician's instructions.  Babies can be fed formula or breast milk as soon as they are hungry.  INCREASE FLUIDS AND FIBER TO AVOID CONSTIPATION.    SURGICAL DRESSING/BATHING: *MAY SHOWER TOMORROW.  NO TUB BATHS, HOT TUBS OR SWIMMING UNTIL APPROVED BY PHYSICIAN**    FOLLOW-UP APPOINTMENT:  A follow-up appointment should be arranged with your doctor in *FOLLOW UP WITH DR. ELLIS**; call to schedule.    You should CALL YOUR PHYSICIAN if you develop:  Fever greater than 101 degrees F.  Pain not relieved by medication, or persistent nausea or vomiting.  Excessive bleeding (blood soaking through dressing) or unexpected drainage from the wound.  Extreme redness or swelling around the incision site, drainage of pus or foul smelling drainage.  Inability to urinate or empty your bladder within 8 hours.  Problems with breathing or chest  pain.    You should call 911 if you develop problems with breathing or chest pain.  If you are unable to contact your doctor or surgical center, you should go to the nearest emergency room or urgent care center.  Physician's telephone #: *[phone]**    If any questions arise, call your doctor.  If your doctor is not available, please feel free to call the Surgical Center at [phone].  The Center is open Monday through Friday from 7AM to 7PM.  You can also call the HEALTH HOTLINE open 24 hours/day, 7 days/week and speak to a nurse at [phone], or toll free at [phone].    A registered nurse may call you a few days after your surgery to see how you are doing after your procedure.    MEDICATIONS: Resume taking daily medication.  Take prescribed pain medication with food.  If no medication is prescribed, you may take non-aspirin pain medication if needed.  PAIN MEDICATION CAN BE VERY CONSTIPATING.  Take a stool softener or laxative such as senokot, pericolace, or milk of magnesia if needed.    Prescription given for *PERCOCET**.  Last pain medication given at *10:12 AM**.    If your physician has prescribed pain medication that includes Acetaminophen (Tylenol), do not take additional Acetaminophen (Tylenol) while taking the prescribed medication.    Depression / Suicide Risk    As you are discharged from this [facility] facility, it is important to learn how to keep safe from harming yourself.    Recognize the warning signs:  · Abrupt changes in personality, positive or negative- including increase in energy   · Giving away possessions  · Change in eating patterns- significant weight changes-  positive or negative  · Change in sleeping patterns- unable to sleep or sleeping all the time   · Unwillingness or inability to communicate  · Depression  · Unusual sadness, discouragement and loneliness  · Talk of wanting to die  · Neglect of personal appearance   · Rebelliousness- reckless  behavior  · Withdrawal from people/activities they love  · Confusion- inability to concentrate     If you or a loved one observes any of these behaviors or has concerns about self-harm, here's what you can do:  · Talk about it- your feelings and reasons for harming yourself  · Remove any means that you might use to hurt yourself (examples: pills, rope, extension cords, firearm)  · Get professional help from the community (Mental Health, Substance Abuse, psychological counseling)  · Do not be alone:Call your Safe Contact- someone whom you trust who will be there for you.  · Call your local CRISIS HOTLINE 040-0303 or 966-469-3028  · Call your local Children's Mobile Crisis Response Team [place] (136) 035-5923 or www.YouFig  · Call the toll free National Suicide Prevention Hotlines   · National Suicide Prevention Lifeline 635-870-YLMZ (2355)  · National Hope Line Network 800-SUICIDE (211-1004)
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
## 2019-03-06 NOTE — OR SURGEON
Immediate Post OP Note    PreOp Diagnosis:     1.  Abnormal uterine bleeding.  2.  Multiparous, desires permanent sterilization.  3.  Possible endometrial polyp or submucosal fibroid.  4.  Desires conservative surgical management.  5.  She has a persistent right ovarian cyst.    PostOp Diagnosis:     1.  As above.  2.  No evidence of endometrial polyp or submucosal fibroid.    Procedure(s):  PELVISCOPY- EUA - Wound Class: Clean  BILATERAL SALPINGECTOMY - Wound Class: Clean  RIGHT OVARIAN CYSTECTOMY - Wound Class: Clean  PLACEMENT OF DAVID  HYSTEROSCOPY WITH MYOSURE-DIAGNOSTIC AND OPERATIVE AND ENDOMETRIAL CURRETTAGE - Wound Class: Clean Contaminated    Surgeon(s):  Alesia Krishnamurthy M.D.    Anesthesiologist/Type of Anesthesia:  Anesthesiologist: Juan Ramon Prado M.D./General and local anesthetic    Surgical Staff:  Circulator: Betty Dominique R.N.  Relief Circulator: Antionette Tan R.N.  Relief Scrub: Deepthi Gillette  Scrub Person: Gaby Nguyen    Specimens removed if any:  ID Type Source Tests Collected by Time Destination   A : Right Ovarian Cyst Cyst Cyst PATHOLOGY SPECIMEN Alesia Krishnamurthy M.D. 3/6/2019 0857    B : Bilateral Fallopian Tubes Tissue Fallopian Tube PATHOLOGY SPECIMEN Alesia Krishnamurthy M.D. 3/6/2019 0909    C : Endometrial Currettage Tissue Endometrium PATHOLOGY SPECIMEN Alesia Krishnamurthy M.D. 3/6/2019 0930        Estimated Blood Loss: 25 cc    Findings: EUA: uterus is AV mobile and 7 weeks size.  No palpable adnexal masses noted.  Cervix is dilated 1 cm.  Laparoscopic findings: normal upper abdomen.  Normal bilateral tubes.  Normal uterus.  Normal left ovary.  Right ovary with a 2 x 2 x 2 cm hemorrhagic ovarian cyst.  Bilateral ureters are visualized.      Hysteroscopic finding: bilateral tubal ostia are visualized.  No evidence of endometrial polyps or submucosal fibroids seen.      Complications: None apparent.        3/6/2019 9:49 AM Alesia Krishnamurthy M.D.

## 2019-04-09 ENCOUNTER — TELEPHONE (OUTPATIENT)
Dept: MEDICAL GROUP | Age: 34
End: 2019-04-09

## 2019-04-09 NOTE — TELEPHONE ENCOUNTER
ESTABLISHED PATIENT PRE-VISIT PLANNING     Patient was NOT contacted to complete PVP.     Note: Patient will not be contacted if there is no indication to call.     1.  Reviewed notes from the last few office visits within the medical group: Yes    2.  If any orders were placed at last visit or intended to be done for this visit (i.e. 6 mos follow-up), do we have Results/Consult Notes?        •  Labs -    Note: If patient appointment is for lab review and patient did not complete labs, check with provider if OK to reschedule patient until labs completed.       •  Imaging - Imaging was not ordered at last office visit.       •  Referrals - Referral ordered, patient was seen and consult notes are in chart. Care Teams updated  NO.    3. Is this appointment scheduled as a Hospital Follow-Up? No    4.  Immunizations were updated in Epic using WebIZ?: Epic matches WebIZ       •  Web Iz Recommendations: Patient is up to date on all vaccines    5.  Patient is due for the following Health Maintenance Topics:   Health Maintenance Due   Topic Date Due   • Annual Wellness Visit  11/15/2014   • PAP SMEAR  12/17/2018           6. Orders for overdue Health Maintenance topics pended in Pre-Charting? N\A    7.  AHA (MDX) form printed for Provider? YES    8.  Patient was NOT informed to arrive 15 min prior to their scheduled appointment and bring in their medication bottles.

## 2019-06-21 ENCOUNTER — PATIENT OUTREACH (OUTPATIENT)
Dept: HEALTH INFORMATION MANAGEMENT | Facility: OTHER | Age: 34
End: 2019-06-21

## 2019-06-21 NOTE — PROGRESS NOTES
Outcome: Left voicemail/ message    Please transfer to [facility]  [phone] when patient returns call.       [facility] Verified: yes     Attempt # 1

## 2019-07-01 ENCOUNTER — HOSPITAL ENCOUNTER (OUTPATIENT)
Dept: LAB | Facility: MEDICAL CENTER | Age: 34
End: 2019-07-01
Attending: INTERNAL MEDICINE
Payer: MEDICARE

## 2019-07-01 DIAGNOSIS — N92.1 MENORRHAGIA WITH IRREGULAR CYCLE: ICD-10-CM

## 2019-07-01 DIAGNOSIS — E55.9 VITAMIN D INSUFFICIENCY: ICD-10-CM

## 2019-07-01 DIAGNOSIS — E89.0 POSTOPERATIVE HYPOTHYROIDISM: ICD-10-CM

## 2019-07-01 LAB
25(OH)D3 SERPL-MCNC: 34 NG/ML (ref 30–100)
ALBUMIN SERPL BCP-MCNC: 4.2 G/DL (ref 3.2–4.9)
ALBUMIN/GLOB SERPL: 1.4 G/DL
ALP SERPL-CCNC: 79 U/L (ref 30–99)
ALT SERPL-CCNC: 13 U/L (ref 2–50)
ANION GAP SERPL CALC-SCNC: 9 MMOL/L (ref 0–11.9)
AST SERPL-CCNC: 15 U/L (ref 12–45)
BASOPHILS # BLD AUTO: 0.5 % (ref 0–1.8)
BASOPHILS # BLD: 0.03 K/UL (ref 0–0.12)
BILIRUB SERPL-MCNC: 0.9 MG/DL (ref 0.1–1.5)
BUN SERPL-MCNC: 15 MG/DL (ref 8–22)
CALCIUM SERPL-MCNC: 9.3 MG/DL (ref 8.5–10.5)
CHLORIDE SERPL-SCNC: 107 MMOL/L (ref 96–112)
CO2 SERPL-SCNC: 23 MMOL/L (ref 20–33)
CREAT SERPL-MCNC: 0.76 MG/DL (ref 0.5–1.4)
EOSINOPHIL # BLD AUTO: 0.1 K/UL (ref 0–0.51)
EOSINOPHIL NFR BLD: 1.8 % (ref 0–6.9)
ERYTHROCYTE [DISTWIDTH] IN BLOOD BY AUTOMATED COUNT: 38.6 FL (ref 35.9–50)
FASTING STATUS PATIENT QL REPORTED: NORMAL
FERRITIN SERPL-MCNC: 10.4 NG/ML (ref 10–291)
GLOBULIN SER CALC-MCNC: 3 G/DL (ref 1.9–3.5)
GLUCOSE SERPL-MCNC: 98 MG/DL (ref 65–99)
HCT VFR BLD AUTO: 47 % (ref 37–47)
HGB BLD-MCNC: 15.5 G/DL (ref 12–16)
IMM GRANULOCYTES # BLD AUTO: 0.01 K/UL (ref 0–0.11)
IMM GRANULOCYTES NFR BLD AUTO: 0.2 % (ref 0–0.9)
IRON SATN MFR SERPL: 22 % (ref 15–55)
IRON SERPL-MCNC: 100 UG/DL (ref 40–170)
LYMPHOCYTES # BLD AUTO: 1.85 K/UL (ref 1–4.8)
LYMPHOCYTES NFR BLD: 32.6 % (ref 22–41)
MCH RBC QN AUTO: 28.9 PG (ref 27–33)
MCHC RBC AUTO-ENTMCNC: 33 G/DL (ref 33.6–35)
MCV RBC AUTO: 87.5 FL (ref 81.4–97.8)
MONOCYTES # BLD AUTO: 0.38 K/UL (ref 0–0.85)
MONOCYTES NFR BLD AUTO: 6.7 % (ref 0–13.4)
NEUTROPHILS # BLD AUTO: 3.31 K/UL (ref 2–7.15)
NEUTROPHILS NFR BLD: 58.2 % (ref 44–72)
NRBC # BLD AUTO: 0 K/UL
NRBC BLD-RTO: 0 /100 WBC
PLATELET # BLD AUTO: 266 K/UL (ref 164–446)
PMV BLD AUTO: 10.8 FL (ref 9–12.9)
POTASSIUM SERPL-SCNC: 4 MMOL/L (ref 3.6–5.5)
PROT SERPL-MCNC: 7.2 G/DL (ref 6–8.2)
RBC # BLD AUTO: 5.37 M/UL (ref 4.2–5.4)
SODIUM SERPL-SCNC: 139 MMOL/L (ref 135–145)
T4 FREE SERPL-MCNC: 0.86 NG/DL (ref 0.53–1.43)
TIBC SERPL-MCNC: 461 UG/DL (ref 250–450)
TSH SERPL DL<=0.005 MIU/L-ACNC: 0.43 UIU/ML (ref 0.38–5.33)
WBC # BLD AUTO: 5.7 K/UL (ref 4.8–10.8)

## 2019-07-01 PROCEDURE — 36415 COLL VENOUS BLD VENIPUNCTURE: CPT

## 2019-07-01 PROCEDURE — 83540 ASSAY OF IRON: CPT

## 2019-07-01 PROCEDURE — 85025 COMPLETE CBC W/AUTO DIFF WBC: CPT

## 2019-07-01 PROCEDURE — 82728 ASSAY OF FERRITIN: CPT

## 2019-07-01 PROCEDURE — 84443 ASSAY THYROID STIM HORMONE: CPT

## 2019-07-01 PROCEDURE — 84439 ASSAY OF FREE THYROXINE: CPT

## 2019-07-01 PROCEDURE — 82306 VITAMIN D 25 HYDROXY: CPT

## 2019-07-01 PROCEDURE — 80053 COMPREHEN METABOLIC PANEL: CPT

## 2019-07-01 PROCEDURE — 83550 IRON BINDING TEST: CPT

## 2019-07-02 NOTE — PROGRESS NOTES
Please contact patient and advise her to schedule an appointment to review lab results.    Margret Titus MD

## 2019-07-03 ENCOUNTER — OFFICE VISIT (OUTPATIENT)
Dept: MEDICAL GROUP | Age: 34
End: 2019-07-03
Payer: MEDICARE

## 2019-07-03 VITALS
WEIGHT: 175.6 LBS | BODY MASS INDEX: 29.26 KG/M2 | TEMPERATURE: 98.7 F | HEIGHT: 65 IN | OXYGEN SATURATION: 97 % | SYSTOLIC BLOOD PRESSURE: 106 MMHG | DIASTOLIC BLOOD PRESSURE: 66 MMHG | HEART RATE: 80 BPM

## 2019-07-03 DIAGNOSIS — E89.0 POSTOPERATIVE HYPOTHYROIDISM: ICD-10-CM

## 2019-07-03 DIAGNOSIS — F33.2 SEVERE EPISODE OF RECURRENT MAJOR DEPRESSIVE DISORDER, WITHOUT PSYCHOTIC FEATURES (HCC): ICD-10-CM

## 2019-07-03 DIAGNOSIS — E55.9 VITAMIN D INSUFFICIENCY: ICD-10-CM

## 2019-07-03 DIAGNOSIS — J45.20 MILD INTERMITTENT ASTHMA WITHOUT COMPLICATION: ICD-10-CM

## 2019-07-03 PROBLEM — N92.1 MENORRHAGIA WITH IRREGULAR CYCLE: Status: RESOLVED | Noted: 2019-01-22 | Resolved: 2019-07-03

## 2019-07-03 PROCEDURE — 99214 OFFICE O/P EST MOD 30 MIN: CPT | Performed by: INTERNAL MEDICINE

## 2019-07-03 RX ORDER — ERGOCALCIFEROL 1.25 MG/1
50000 CAPSULE ORAL
Refills: 3 | COMMUNITY
Start: 2019-04-14 | End: 2019-12-18

## 2019-07-03 RX ORDER — BUPROPION HYDROCHLORIDE 100 MG/1
100 TABLET ORAL 2 TIMES DAILY
Qty: 180 TAB | Refills: 1 | Status: SHIPPED | OUTPATIENT
Start: 2019-07-03 | End: 2020-09-04

## 2019-07-03 RX ORDER — BUPROPION HYDROCHLORIDE 75 MG/1
TABLET ORAL
COMMUNITY
Start: 2019-04-20 | End: 2019-07-03 | Stop reason: SDUPTHER

## 2019-07-03 ASSESSMENT — PATIENT HEALTH QUESTIONNAIRE - PHQ9
5. POOR APPETITE OR OVEREATING: 3 - NEARLY EVERY DAY
SUM OF ALL RESPONSES TO PHQ QUESTIONS 1-9: 18
CLINICAL INTERPRETATION OF PHQ2 SCORE: 3

## 2019-07-03 ASSESSMENT — ACTIVITIES OF DAILY LIVING (ADL): BATHING_REQUIRES_ASSISTANCE: 0

## 2019-07-03 ASSESSMENT — PAIN SCALES - GENERAL: PAINLEVEL: 5=MODERATE PAIN

## 2019-07-03 ASSESSMENT — ENCOUNTER SYMPTOMS: GENERAL WELL-BEING: GOOD
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
## 2019-07-08 ENCOUNTER — TELEPHONE (OUTPATIENT)
Dept: MEDICAL GROUP | Age: 34
End: 2019-07-08

## 2019-07-08 NOTE — TELEPHONE ENCOUNTER
Phone Number Called: [phone] (home)       Call outcome: spoke to patient regarding message below    Message: Return pt call. Advised can access [employer] online to print off immunization records for school. Also provided [employer] help Desk phone number [phone]. Also discussed referral given by Dr. iTtus to [facility]: Dr. Koehler. Gave their contact ph# & advised can call to schedule appt.

## 2019-07-23 ENCOUNTER — TELEPHONE (OUTPATIENT)
Dept: MEDICAL GROUP | Age: 34
End: 2019-07-23

## 2019-07-23 DIAGNOSIS — Z11.3 SCREEN FOR STD (SEXUALLY TRANSMITTED DISEASE): ICD-10-CM

## 2019-07-23 NOTE — TELEPHONE ENCOUNTER
Phone Number Called: [phone] (home)       Call outcome: spoke to patient regarding message below    Message: Return pt call. Pt request lab orders to test for STD's. Pt  states she wants a full STD panel. She stated Hep C, HIV, Syphilis, Herpes, Gonorrhea, Chlamydia, etc...

## 2019-07-24 NOTE — TELEPHONE ENCOUNTER
Please inform patient that the blood test and urine test are ordered for STD screening.  She can go to [facility] lab to complete the tests.    Margret Titus M.D.

## 2019-07-25 NOTE — TELEPHONE ENCOUNTER
Phone Number Called: [phone] (home)       Call outcome: left message for patient to call back regarding message below    Message: Left VM that labs were ordered and are ready to be competed.

## 2019-07-26 ENCOUNTER — HOSPITAL ENCOUNTER (OUTPATIENT)
Dept: LAB | Facility: MEDICAL CENTER | Age: 34
End: 2019-07-26
Attending: INTERNAL MEDICINE
Payer: MEDICARE

## 2019-07-26 DIAGNOSIS — E89.0 POSTOPERATIVE HYPOTHYROIDISM: ICD-10-CM

## 2019-07-26 DIAGNOSIS — Z11.3 SCREEN FOR STD (SEXUALLY TRANSMITTED DISEASE): ICD-10-CM

## 2019-07-26 DIAGNOSIS — J45.20 MILD INTERMITTENT ASTHMA WITHOUT COMPLICATION: ICD-10-CM

## 2019-07-26 DIAGNOSIS — E55.9 VITAMIN D INSUFFICIENCY: ICD-10-CM

## 2019-07-26 DIAGNOSIS — F33.2 SEVERE EPISODE OF RECURRENT MAJOR DEPRESSIVE DISORDER, WITHOUT PSYCHOTIC FEATURES (HCC): ICD-10-CM

## 2019-07-26 LAB
25(OH)D3 SERPL-MCNC: 46 NG/ML (ref 30–100)
ALBUMIN SERPL BCP-MCNC: 4.6 G/DL (ref 3.2–4.9)
ALBUMIN/GLOB SERPL: 1.4 G/DL
ALP SERPL-CCNC: 70 U/L (ref 30–99)
ALT SERPL-CCNC: 8 U/L (ref 2–50)
ANION GAP SERPL CALC-SCNC: 10 MMOL/L (ref 0–11.9)
AST SERPL-CCNC: 12 U/L (ref 12–45)
BASOPHILS # BLD AUTO: 0.4 % (ref 0–1.8)
BASOPHILS # BLD: 0.02 K/UL (ref 0–0.12)
BILIRUB SERPL-MCNC: 1.3 MG/DL (ref 0.1–1.5)
BUN SERPL-MCNC: 15 MG/DL (ref 8–22)
CALCIUM SERPL-MCNC: 9.2 MG/DL (ref 8.5–10.5)
CHLORIDE SERPL-SCNC: 106 MMOL/L (ref 96–112)
CO2 SERPL-SCNC: 24 MMOL/L (ref 20–33)
CREAT SERPL-MCNC: 0.86 MG/DL (ref 0.5–1.4)
EOSINOPHIL # BLD AUTO: 0.07 K/UL (ref 0–0.51)
EOSINOPHIL NFR BLD: 1.3 % (ref 0–6.9)
ERYTHROCYTE [DISTWIDTH] IN BLOOD BY AUTOMATED COUNT: 40.2 FL (ref 35.9–50)
GLOBULIN SER CALC-MCNC: 3.2 G/DL (ref 1.9–3.5)
GLUCOSE SERPL-MCNC: 86 MG/DL (ref 65–99)
HAV IGM SERPL QL IA: NEGATIVE
HBV CORE IGM SER QL: NEGATIVE
HBV SURFACE AG SER QL: NEGATIVE
HCT VFR BLD AUTO: 45.8 % (ref 37–47)
HCV AB SER QL: NEGATIVE
HGB BLD-MCNC: 15.3 G/DL (ref 12–16)
HIV 1+2 AB+HIV1 P24 AG SERPL QL IA: NON REACTIVE
IMM GRANULOCYTES # BLD AUTO: 0.01 K/UL (ref 0–0.11)
IMM GRANULOCYTES NFR BLD AUTO: 0.2 % (ref 0–0.9)
LYMPHOCYTES # BLD AUTO: 1.69 K/UL (ref 1–4.8)
LYMPHOCYTES NFR BLD: 30.5 % (ref 22–41)
MCH RBC QN AUTO: 29.4 PG (ref 27–33)
MCHC RBC AUTO-ENTMCNC: 33.4 G/DL (ref 33.6–35)
MCV RBC AUTO: 87.9 FL (ref 81.4–97.8)
MONOCYTES # BLD AUTO: 0.38 K/UL (ref 0–0.85)
MONOCYTES NFR BLD AUTO: 6.8 % (ref 0–13.4)
NEUTROPHILS # BLD AUTO: 3.38 K/UL (ref 2–7.15)
NEUTROPHILS NFR BLD: 60.8 % (ref 44–72)
NRBC # BLD AUTO: 0 K/UL
NRBC BLD-RTO: 0 /100 WBC
PLATELET # BLD AUTO: 280 K/UL (ref 164–446)
PMV BLD AUTO: 10.8 FL (ref 9–12.9)
POTASSIUM SERPL-SCNC: 3.9 MMOL/L (ref 3.6–5.5)
PROT SERPL-MCNC: 7.8 G/DL (ref 6–8.2)
RBC # BLD AUTO: 5.21 M/UL (ref 4.2–5.4)
SODIUM SERPL-SCNC: 140 MMOL/L (ref 135–145)
T4 FREE SERPL-MCNC: 1.62 NG/DL (ref 0.53–1.43)
TREPONEMA PALLIDUM IGG+IGM AB [PRESENCE] IN SERUM OR PLASMA BY IMMUNOASSAY: NON REACTIVE
TSH SERPL DL<=0.005 MIU/L-ACNC: 0.17 UIU/ML (ref 0.38–5.33)
WBC # BLD AUTO: 5.6 K/UL (ref 4.8–10.8)

## 2019-07-26 PROCEDURE — 80053 COMPREHEN METABOLIC PANEL: CPT

## 2019-07-26 PROCEDURE — 86696 HERPES SIMPLEX TYPE 2 TEST: CPT

## 2019-07-26 PROCEDURE — 86695 HERPES SIMPLEX TYPE 1 TEST: CPT

## 2019-07-26 PROCEDURE — 82306 VITAMIN D 25 HYDROXY: CPT

## 2019-07-26 PROCEDURE — 86780 TREPONEMA PALLIDUM: CPT

## 2019-07-26 PROCEDURE — 87591 N.GONORRHOEAE DNA AMP PROB: CPT

## 2019-07-26 PROCEDURE — 84443 ASSAY THYROID STIM HORMONE: CPT

## 2019-07-26 PROCEDURE — 87389 HIV-1 AG W/HIV-1&-2 AB AG IA: CPT

## 2019-07-26 PROCEDURE — 80074 ACUTE HEPATITIS PANEL: CPT

## 2019-07-26 PROCEDURE — 87491 CHLMYD TRACH DNA AMP PROBE: CPT

## 2019-07-26 PROCEDURE — 85025 COMPLETE CBC W/AUTO DIFF WBC: CPT

## 2019-07-26 PROCEDURE — 84439 ASSAY OF FREE THYROXINE: CPT

## 2019-07-26 PROCEDURE — 36415 COLL VENOUS BLD VENIPUNCTURE: CPT

## 2019-07-27 LAB
C TRACH DNA SPEC QL NAA+PROBE: NEGATIVE
N GONORRHOEA DNA SPEC QL NAA+PROBE: NEGATIVE
SPECIMEN SOURCE: NORMAL

## 2019-07-28 LAB
HSV1 GG IGG SER-ACNC: 56.4 IV
HSV2 GG IGG SER-ACNC: 0.11 IV

## 2019-07-29 ENCOUNTER — TELEPHONE (OUTPATIENT)
Dept: MEDICAL GROUP | Age: 34
End: 2019-07-29

## 2019-07-30 NOTE — TELEPHONE ENCOUNTER
Phone Number Called: [phone] (home)       Call outcome: spoke to patient regarding message below    Message: Received vm from pt requesting to sched appt. Returned call. Pt declined to schedule appt. Advised pt labs have been final resulted.

## 2019-08-12 ENCOUNTER — OFFICE VISIT (OUTPATIENT)
Dept: MEDICAL GROUP | Age: 34
End: 2019-08-12
Payer: MEDICARE

## 2019-08-12 ENCOUNTER — HOSPITAL ENCOUNTER (OUTPATIENT)
Facility: MEDICAL CENTER | Age: 34
End: 2019-08-12
Attending: INTERNAL MEDICINE
Payer: MEDICARE

## 2019-08-12 VITALS
HEART RATE: 76 BPM | OXYGEN SATURATION: 97 % | TEMPERATURE: 99 F | WEIGHT: 174.2 LBS | DIASTOLIC BLOOD PRESSURE: 60 MMHG | HEIGHT: 65 IN | BODY MASS INDEX: 29.02 KG/M2 | SYSTOLIC BLOOD PRESSURE: 110 MMHG

## 2019-08-12 DIAGNOSIS — N89.8 VAGINAL ITCHING: ICD-10-CM

## 2019-08-12 DIAGNOSIS — N89.8 VAGINAL DISCHARGE: ICD-10-CM

## 2019-08-12 DIAGNOSIS — E89.0 POSTOPERATIVE HYPOTHYROIDISM: ICD-10-CM

## 2019-08-12 DIAGNOSIS — J45.20 MILD INTERMITTENT ASTHMA WITHOUT COMPLICATION: ICD-10-CM

## 2019-08-12 DIAGNOSIS — E55.9 VITAMIN D INSUFFICIENCY: ICD-10-CM

## 2019-08-12 PROCEDURE — 87510 GARDNER VAG DNA DIR PROBE: CPT

## 2019-08-12 PROCEDURE — 87480 CANDIDA DNA DIR PROBE: CPT

## 2019-08-12 PROCEDURE — 87491 CHLMYD TRACH DNA AMP PROBE: CPT

## 2019-08-12 PROCEDURE — 87660 TRICHOMONAS VAGIN DIR PROBE: CPT

## 2019-08-12 PROCEDURE — 99214 OFFICE O/P EST MOD 30 MIN: CPT | Performed by: INTERNAL MEDICINE

## 2019-08-12 PROCEDURE — 87591 N.GONORRHOEAE DNA AMP PROB: CPT

## 2019-08-12 RX ORDER — FLUCONAZOLE 150 MG/1
150 TABLET ORAL DAILY
Qty: 2 TAB | Refills: 0 | Status: SHIPPED | OUTPATIENT
Start: 2019-08-12 | End: 2019-08-13

## 2019-08-12 ASSESSMENT — ENCOUNTER SYMPTOMS: GENERAL WELL-BEING: GOOD

## 2019-08-12 ASSESSMENT — PAIN SCALES - GENERAL: PAINLEVEL: NO PAIN

## 2019-08-12 ASSESSMENT — ACTIVITIES OF DAILY LIVING (ADL): BATHING_REQUIRES_ASSISTANCE: 0

## 2019-08-12 ASSESSMENT — PATIENT HEALTH QUESTIONNAIRE - PHQ9: CLINICAL INTERPRETATION OF PHQ2 SCORE: 0

## 2019-08-12 NOTE — PROGRESS NOTES
Subjective:   Maria M Hernandez is a 34 y.o. female here today for evaluation and management of:    Postoperative hypothyroidism  She is taking levothyroxine 175 mcg daily. She denies palpations, shakes, tremors, weight change, or diarrhea. Her TSH is low at 0.170 on most recent blood work. She underwent a thyroidectomy. I discussed the blood test with the patient in clinic today.    Results for MARIA M HERNANDEZ (MRN [id number]) as of 8/12/2019 15:24   Ref. Range 7/1/2019 11:32 7/26/2019 14:33   TSH Latest Ref Range: 0.380 - 5.330 uIU/mL 0.430 0.170 (L)   Free T-4 Latest Ref Range: 0.53 - 1.43 ng/dL 0.86 1.62 (H)       Vitamin D insufficiency  She is currently taking a 01078 IU vitamin D supplement. She denies any side effects. Her vitamin D level was 46 on most recent lab work. I discussed the blood test with the patient in clinic today.    Results for MARIA M HERNANDEZ (MRN [id number]) as of 8/12/2019 14:42   Ref. Range 7/26/2019 14:33   25-Hydroxy   Vitamin D 25 Latest Ref Range: 30 - 100 ng/mL 46     Mild intermittent asthma without complication  Patient continues to take montelukast daily and use albuterol inhaler as needed for her asthma. She also takes hydroxyzine and loratadine as needed for her seasonal allergies. She denies side effects from taking current medications.    Vaginal itching  Vaginal discharge  She complains of vaginal itching, irritation, and discharge. Her symptoms began yesterday. She denies fever, dysuria, open sores on her genitalia. She is scheduled to see her gynecologist for a pap smear on 8/23. She already tested negative blood test and urine test on recent STD panel except positive for HSV 1 IgG.  Patient denies any blisters or lesions on genitalia or oral cavity.  She has history of recurrent cold sore.  She also denied any new sexual partner since she got .  Patient concerns of any sexual transmitted infection even though we complete the urine and blood test.  She wanted  "to repeat vaginal swabs for gonorrhea and chlamydia again.  I discussed the blood test with the patient in clinic today.  Patient denies recent antibiotic treatment.      Current medicines (including changes today)  Current Outpatient Medications   Medication Sig Dispense Refill   • fluconazole (DIFLUCAN) 150 MG tablet Take 1 Tab by mouth every day for 1 dose. May take second dose in 72 hours if symptoms persist. 2 Tab 0   • vitamin D, Ergocalciferol, (DRISDOL) 46758 units Cap capsule Take 50,000 Units by mouth.  3   • buPROPion (WELLBUTRIN) 100 MG Tab Take 1 Tab by mouth 2 times a day. 180 Tab 1   • cetirizine (ZYRTEC) 10 MG Tab Take 10 mg by mouth 2 times a day.     • loratadine (CLARITIN) 10 MG Tab Take 10 mg by mouth every day.  0   • hydrOXYzine HCl (ATARAX) 10 MG Tab Take 10 mg by mouth 3 times a day as needed for Itching.     • therapeutic multivitamin-minerals (THERAGRAN-M) Tab Take 1 Tab by mouth every day.     • levothyroxine (SYNTHROID) 175 MCG Tab Take 1 Tab by mouth Every morning on an empty stomach. 90 Tab 3   • raNITidine (ZANTAC) 150 MG Tab Take 1 Tab by mouth 2 times a day. 180 Tab 3   • montelukast (SINGULAIR) 10 MG Tab Take 1 Tab by mouth every day. TAKE 1 TAB BY MOUTH EVERY DAY. (Patient taking differently: Take 10 mg by mouth every evening. TAKE 1 TAB BY MOUTH EVERY DAY.) 90 Tab 3     No current facility-administered medications for this visit.      She  has a past medical history of Anemia, Anesthesia, Arthritis, Asthma, Gynecological disorder, Hashimoto disease, Liver disease, Mastocytosis, and Renal disorder.    ROS   No chest pain, no shortness of breath, no abdominal pain  No fever, dysuria, open sores on her genitalia  No palpations, shakes, tremors, weight change, or diarrhea     Objective:     /60 (BP Location: Right arm, Patient Position: Sitting, BP Cuff Size: Adult)   Pulse 76   Temp 37.2 °C (99 °F) (Temporal)   Ht 1.645 m (5' 4.75\")   Wt 79 kg (174 lb 3.2 oz)   SpO2 97%  " Body mass index is 29.21 kg/m².   Physical Exam:  General: Alert, oriented and no acute distress.  Eye contact is good, speech goal directed, affect calm  HEENT: conjunctiva non-injected, sclera non-icteric.  Oral mucous membranes pink and moist with no lesions.  Pinna normal.   Lungs: Normal respiratory effort, clear to auscultation bilaterally with good excursion.  CV: regular rate and rhythm. No murmurs.  Abdomen: soft, non distended, nontender, Bowel sound normal.  Ext: no edema, color normal, vascularity normal, temperature normal      Assessment and Plan:   The following treatment plan was discussed     1. Postoperative hypothyroidism  - I explained that she is currently over replaced on levothyroxine 175 mcg. She is not interested in adjusting her levothyroxine dosage.  Patient prefers to keep her current dose of levothyroxine and continue to monitor her symptoms.  She agrees to recheck thyroid function test in 3 months for closely monitor.  She is advised to return back to clinic sooner if she has any signs or symptoms of hyper or hypothyroid.  - Comp Metabolic Panel; Future  - TSH; Future  - FREE THYROXINE; Future    2. Vitamin D insufficiency  - Well-controlled. Continue current regimens, ergocalciferol 50,000 units once a week.   - Comp Metabolic Panel; Future    3. Mild intermittent asthma without complication  - Well-controlled. Continue current regimens.  - Comp Metabolic Panel; Future    4. Vaginal itching  5. Vaginal discharge  - Patient complains of vaginal itching and discharge. This is a new problem. I prescribed fluconazole 150 mg to treat for possible yeast infection. We will swab for vaginal pathogens, gonorrhea and chlamydia.  - Patient already tested negative for HIV, gonorrhea, syphilis, and chlamydia. She is interested in a repeat chlamydia/GC PCR swab, which I ordered.  - She is scheduled with gynecologist for her pap smear on 8/23. I asked the patient to request a repeat swab to determine  if the infection is still present.  - fluconazole (DIFLUCAN) 150 MG tablet; Take 1 Tab by mouth every day for 1 dose. May take second dose in 72 hours if symptoms persist.  Dispense: 2 Tab; Refill: 0  - VAGINAL PATHOGENS DNA PANEL; Future    Followup: Return in about 3 months (around 11/12/2019), or if symptoms worsen or fail to improve, for Hypothyroid, Asthma, depression, vitamin D insufficiency, Lab review.      Please note that this dictation was created using voice recognition software. I have made every reasonable attempt to correct obvious errors, but I expect that there may have unintended errors in text, spelling, punctuation, or grammar that I did not discover.    I, Sea Crouch (Scribe), am scribing for, and in the presence of, Margret Titus M.D..    Electronically signed by: Sea Crouch (Scribe), 8/12/2019    I, Margret Titus M.D., personally performed the services described in this documentation, as scribed by Sea Crouch in my presence, and it is both accurate and complete.

## 2019-08-13 DIAGNOSIS — N89.8 VAGINAL DISCHARGE: ICD-10-CM

## 2019-08-13 DIAGNOSIS — N76.0 BACTERIAL VAGINOSIS: ICD-10-CM

## 2019-08-13 DIAGNOSIS — B96.89 BACTERIAL VAGINOSIS: ICD-10-CM

## 2019-08-13 DIAGNOSIS — N89.8 VAGINAL ITCHING: ICD-10-CM

## 2019-08-13 LAB
C TRACH DNA SPEC QL NAA+PROBE: NEGATIVE
CANDIDA DNA VAG QL PROBE+SIG AMP: NEGATIVE
G VAGINALIS DNA VAG QL PROBE+SIG AMP: POSITIVE
N GONORRHOEA DNA SPEC QL NAA+PROBE: NEGATIVE
SPECIMEN SOURCE: NORMAL
T VAGINALIS DNA VAG QL PROBE+SIG AMP: NEGATIVE

## 2019-08-13 RX ORDER — METRONIDAZOLE 500 MG/1
500 TABLET ORAL 2 TIMES DAILY
Qty: 14 TAB | Refills: 0 | Status: SHIPPED | OUTPATIENT
Start: 2019-08-13 | End: 2019-08-20

## 2019-08-14 NOTE — PROGRESS NOTES
Recent vaginal sweats came back positive for Gardnerella vaginalis, and negative for Candida, trichomonas vaginalis, chlamydia, gonorrhea.  Patient was empirically prescribed fluconazole 1 dose yesterday.  Patient was informed to stop taking fluconazole.    Metronidazole 500 mg was prescribed to [facility] pharmacy to treat bacterial vaginosis.  Patient was informed with result note together through my chart.    Margret Titus M.D.

## 2019-08-20 ENCOUNTER — APPOINTMENT (OUTPATIENT)
Dept: BEHAVIORAL HEALTH | Facility: CLINIC | Age: 34
End: 2019-08-20
Payer: MEDICARE

## 2019-10-01 DIAGNOSIS — B00.9 HSV (HERPES SIMPLEX VIRUS) INFECTION: ICD-10-CM

## 2019-10-01 DIAGNOSIS — B00.1 COLD SORE: ICD-10-CM

## 2019-10-01 RX ORDER — ACYCLOVIR 50 MG/G
OINTMENT TOPICAL
Qty: 30 G | Refills: 0 | Status: SHIPPED | OUTPATIENT
Start: 2019-10-01

## 2019-10-01 RX ORDER — ACYCLOVIR 400 MG/1
400 TABLET ORAL 3 TIMES DAILY
Qty: 21 TAB | Refills: 0 | Status: SHIPPED | OUTPATIENT
Start: 2019-10-01 | End: 2019-10-08

## 2019-10-02 NOTE — PROGRESS NOTES
Patient requested to refill Zovirax ointment and oral medication for severe cold sore.  Please see patient's message on 9/30/2019 for discussion.    Refill done.    Margret Titus M.D.

## 2019-10-11 DIAGNOSIS — L50.3 DERMATOGRAPHIC URTICARIA: ICD-10-CM

## 2019-10-11 DIAGNOSIS — D47.09 MAST CELL DISEASE: ICD-10-CM

## 2019-10-11 DIAGNOSIS — J45.20 MILD INTERMITTENT ASTHMA WITHOUT COMPLICATION: ICD-10-CM

## 2019-10-14 RX ORDER — MONTELUKAST SODIUM 10 MG/1
TABLET ORAL
Qty: 90 TAB | Refills: 2 | Status: SHIPPED | OUTPATIENT
Start: 2019-10-14 | End: 2021-01-25 | Stop reason: SDUPTHER

## 2019-10-14 RX ORDER — RANITIDINE 150 MG/1
TABLET ORAL
Qty: 180 TAB | Refills: 1 | OUTPATIENT
Start: 2019-10-14

## 2019-10-14 RX ORDER — FAMOTIDINE 20 MG/1
20 TABLET, FILM COATED ORAL 2 TIMES DAILY
Qty: 180 TAB | Refills: 2 | Status: SHIPPED | OUTPATIENT
Start: 2019-10-14 | End: 2021-01-27

## 2019-10-15 NOTE — TELEPHONE ENCOUNTER
Please call to inform patient that I switched ranitidine to famotidine as Ranitidine has been recalled due to carcinogen. Please advise her to take famotidine 20 mg1 tab twice a day. I also refilled Montelukast 10 mg today.    Margret Titus M.D.

## 2019-10-17 NOTE — TELEPHONE ENCOUNTER
Phone Number Called: [phone] (home)     Call outcome: left message for patient to call back regarding message below    Message: Per Dr. Titus - Please call to inform patient that I switched ranitidine to famotidine as Ranitidine has been recalled due to carcinogen. Please advise her to take famotidine 20 mg1 tab twice a day. I also refilled Montelukast 10 mg today.

## 2019-11-07 DIAGNOSIS — D47.09 MAST CELL DISEASE: ICD-10-CM

## 2019-11-07 DIAGNOSIS — L50.3 DERMATOGRAPHIC URTICARIA: ICD-10-CM

## 2019-11-07 RX ORDER — HYDROXYZINE HYDROCHLORIDE 10 MG/1
TABLET, FILM COATED ORAL
Qty: 90 TAB | Refills: 3 | Status: SHIPPED | OUTPATIENT
Start: 2019-11-07 | End: 2021-01-25 | Stop reason: SDUPTHER

## 2019-11-09 NOTE — PROGRESS NOTES
Outcome: Left voicemail/ message    Please transfer to [facility]  [phone] when patient returns call.     [facility] Verified: yes     Attempt # 2

## 2019-11-18 ENCOUNTER — APPOINTMENT (OUTPATIENT)
Dept: MEDICAL GROUP | Age: 34
End: 2019-11-18
Payer: MEDICARE

## 2019-12-18 DIAGNOSIS — E55.9 VITAMIN D INSUFFICIENCY: ICD-10-CM

## 2019-12-18 RX ORDER — ERGOCALCIFEROL 1.25 MG/1
CAPSULE ORAL
Qty: 12 CAP | Refills: 0 | Status: SHIPPED | OUTPATIENT
Start: 2019-12-18 | End: 2022-03-18

## 2019-12-20 DIAGNOSIS — E89.0 POSTOPERATIVE HYPOTHYROIDISM: ICD-10-CM

## 2019-12-20 NOTE — TELEPHONE ENCOUNTER
Was the patient seen in the last year in this department? Yes     Does patient have an active prescription for medications requested? No      Received Request Via: Pharmacy    Patients chart says that she is taking 175 mcg. Fax request is for 150 mcg

## 2019-12-20 NOTE — TELEPHONE ENCOUNTER
Please call the patient, she is due for labs, last OV it was recommended that she cut her dose down to 150mcg due to over suppression of TSH. These labs were ordered by Dr. Titus. Will refill once these have been resulted.

## 2019-12-23 NOTE — TELEPHONE ENCOUNTER
Phone Number Called: [phone] (home)     Call outcome: left message for patient to call back regarding message below    Message: LVMTCB

## 2019-12-26 DIAGNOSIS — E89.0 POSTOPERATIVE HYPOTHYROIDISM: ICD-10-CM

## 2019-12-26 NOTE — TELEPHONE ENCOUNTER
Phone Number Called: [phone] (home)       Call outcome: left message for patient to call back regarding message below    Message: 2nd attempt LVM

## 2019-12-27 NOTE — TELEPHONE ENCOUNTER
Phone Number Called: [phone] (home)     Call outcome: spoke to patient regarding message below    Message: Pt. States that she does not have a thyroid and needs the medication as soon as possible. I explained to the patient that the lab work is needed so the dosing can be done correctly. Advised patient to complete labs as soon as possible. Pt. Understood

## 2019-12-30 RX ORDER — LEVOTHYROXINE SODIUM 175 UG/1
175 TABLET ORAL
Qty: 90 TAB | Refills: 3 | Status: SHIPPED | OUTPATIENT
Start: 2019-12-30 | End: 2020-01-07 | Stop reason: SDUPTHER

## 2019-12-30 RX ORDER — LEVOTHYROXINE SODIUM 0.15 MG/1
150 TABLET ORAL
Qty: 90 TAB | Refills: 1 | Status: SHIPPED | OUTPATIENT
Start: 2019-12-30 | End: 2020-01-07

## 2020-01-03 ENCOUNTER — HOSPITAL ENCOUNTER (OUTPATIENT)
Dept: LAB | Facility: MEDICAL CENTER | Age: 35
End: 2020-01-03
Attending: INTERNAL MEDICINE
Payer: MEDICARE

## 2020-01-03 DIAGNOSIS — E89.0 POSTOPERATIVE HYPOTHYROIDISM: ICD-10-CM

## 2020-01-03 DIAGNOSIS — E55.9 VITAMIN D INSUFFICIENCY: ICD-10-CM

## 2020-01-03 DIAGNOSIS — J45.20 MILD INTERMITTENT ASTHMA WITHOUT COMPLICATION: ICD-10-CM

## 2020-01-03 LAB
ALBUMIN SERPL BCP-MCNC: 4.3 G/DL (ref 3.2–4.9)
ALBUMIN/GLOB SERPL: 1.4 G/DL
ALP SERPL-CCNC: 63 U/L (ref 30–99)
ALT SERPL-CCNC: 13 U/L (ref 2–50)
ANION GAP SERPL CALC-SCNC: 7 MMOL/L (ref 0–11.9)
AST SERPL-CCNC: 19 U/L (ref 12–45)
BILIRUB SERPL-MCNC: 0.6 MG/DL (ref 0.1–1.5)
BUN SERPL-MCNC: 17 MG/DL (ref 8–22)
CALCIUM SERPL-MCNC: 9 MG/DL (ref 8.5–10.5)
CHLORIDE SERPL-SCNC: 106 MMOL/L (ref 96–112)
CO2 SERPL-SCNC: 24 MMOL/L (ref 20–33)
CREAT SERPL-MCNC: 0.92 MG/DL (ref 0.5–1.4)
GLOBULIN SER CALC-MCNC: 3 G/DL (ref 1.9–3.5)
GLUCOSE SERPL-MCNC: 97 MG/DL (ref 65–99)
POTASSIUM SERPL-SCNC: 4.1 MMOL/L (ref 3.6–5.5)
PROT SERPL-MCNC: 7.3 G/DL (ref 6–8.2)
SODIUM SERPL-SCNC: 137 MMOL/L (ref 135–145)
T4 FREE SERPL-MCNC: 0.55 NG/DL (ref 0.53–1.43)
TSH SERPL DL<=0.005 MIU/L-ACNC: 25.75 UIU/ML (ref 0.38–5.33)

## 2020-01-03 PROCEDURE — 84443 ASSAY THYROID STIM HORMONE: CPT

## 2020-01-03 PROCEDURE — 36415 COLL VENOUS BLD VENIPUNCTURE: CPT

## 2020-01-03 PROCEDURE — 80053 COMPREHEN METABOLIC PANEL: CPT

## 2020-01-03 PROCEDURE — 84439 ASSAY OF FREE THYROXINE: CPT

## 2020-01-06 ENCOUNTER — PATIENT MESSAGE (OUTPATIENT)
Dept: MEDICAL GROUP | Age: 35
End: 2020-01-06

## 2020-01-06 DIAGNOSIS — E89.0 POSTOPERATIVE HYPOTHYROIDISM: ICD-10-CM

## 2020-01-07 ENCOUNTER — PATIENT MESSAGE (OUTPATIENT)
Dept: MEDICAL GROUP | Facility: LAB | Age: 35
End: 2020-01-07

## 2020-01-07 DIAGNOSIS — E89.0 POSTOPERATIVE HYPOTHYROIDISM: ICD-10-CM

## 2020-01-07 RX ORDER — LEVOTHYROXINE SODIUM 175 UG/1
175 TABLET ORAL
Qty: 90 TAB | Refills: 1 | Status: SHIPPED | OUTPATIENT
Start: 2020-01-07 | End: 2020-09-04 | Stop reason: SDUPTHER

## 2020-09-03 ENCOUNTER — TELEPHONE (OUTPATIENT)
Dept: MEDICAL GROUP | Age: 35
End: 2020-09-03

## 2020-09-03 NOTE — TELEPHONE ENCOUNTER
Phone Number Called: [phone] (home)       Call outcome: Did not leave a detailed message. Requested patient to call back.    Message: LVM 1st attempt

## 2020-09-03 NOTE — TELEPHONE ENCOUNTER
I have not seen her since 2018. She is Dr Titus's patient. She will need appointment to establish care. Please schedule.   Vivian Ramos M.D.

## 2020-09-03 NOTE — TELEPHONE ENCOUNTER
1. Caller Name: Annamaria Babin                          Call Back Number: [phone] (home)         How would the patient prefer to be contacted with a response: Phone call do NOT leave a detailed message    2. SPECIFIC Action To Be Taken: Referral pending, please sign.    3. Diagnosis/Clinical Reason for Request: dr dora hickman for a umbilical hernia repair, mesh removal for ingunial hernia, and tummy tuck    4. Specialty & Provider Name/Lab/Imaging Location: dr dora hickman for a umbilical hernia repair, mesh removal for ingunial hernia, and tummy tuck    5. Is appointment scheduled for requested order/referral: no    Patient was not informed they will receive a return phone call from the office ONLY if there are any questions before processing their request. Advised to call back if they haven't received a call from the referral department in 5 days.

## 2020-09-04 ENCOUNTER — OFFICE VISIT (OUTPATIENT)
Dept: MEDICAL GROUP | Facility: LAB | Age: 35
End: 2020-09-04
Payer: MEDICARE

## 2020-09-04 VITALS
TEMPERATURE: 98.3 F | RESPIRATION RATE: 13 BRPM | BODY MASS INDEX: 30.53 KG/M2 | WEIGHT: 190 LBS | DIASTOLIC BLOOD PRESSURE: 70 MMHG | SYSTOLIC BLOOD PRESSURE: 116 MMHG | HEIGHT: 66 IN | OXYGEN SATURATION: 97 % | HEART RATE: 76 BPM

## 2020-09-04 DIAGNOSIS — E89.0 POSTOPERATIVE HYPOTHYROIDISM: ICD-10-CM

## 2020-09-04 DIAGNOSIS — Z76.89 ENCOUNTER TO ESTABLISH CARE: ICD-10-CM

## 2020-09-04 DIAGNOSIS — D63.8 ANEMIA IN OTHER CHRONIC DISEASES CLASSIFIED ELSEWHERE: ICD-10-CM

## 2020-09-04 DIAGNOSIS — K42.9 UMBILICAL HERNIA WITHOUT OBSTRUCTION AND WITHOUT GANGRENE: ICD-10-CM

## 2020-09-04 PROBLEM — Z11.3 SCREEN FOR STD (SEXUALLY TRANSMITTED DISEASE): Status: RESOLVED | Noted: 2019-07-23 | Resolved: 2020-09-04

## 2020-09-04 PROCEDURE — 99214 OFFICE O/P EST MOD 30 MIN: CPT | Performed by: FAMILY MEDICINE

## 2020-09-04 RX ORDER — LEVOTHYROXINE SODIUM 175 UG/1
TABLET ORAL
Qty: 112 TAB | Refills: 3 | Status: SHIPPED | OUTPATIENT
Start: 2020-09-04 | End: 2020-12-14 | Stop reason: SDUPTHER

## 2020-09-04 RX ORDER — ALBUTEROL SULFATE 90 UG/1
2 AEROSOL, METERED RESPIRATORY (INHALATION) EVERY 6 HOURS PRN
COMMUNITY
End: 2021-01-25 | Stop reason: SDUPTHER

## 2020-09-04 ASSESSMENT — ENCOUNTER SYMPTOMS
DIZZINESS: 0
CHILLS: 0
NERVOUS/ANXIOUS: 0
SHORTNESS OF BREATH: 0
DEPRESSION: 0
WHEEZING: 0
PALPITATIONS: 0
FEVER: 0

## 2020-09-04 ASSESSMENT — FIBROSIS 4 INDEX: FIB4 SCORE: 0.68

## 2020-09-04 NOTE — PROGRESS NOTES
"Subjective:   Annamaria Babin is a 35 y.o. female here today for   Chief Complaint   Patient presents with   • Establish Care   • Referral Needed     Referral to . pulling, strech, and zing pains in groin.    • Requesting Labs     Thyroid        #Establish Care   -Reviewed all past medical history, family history, social history.  -Reviewed all screening/vaccinations:   -Diet and Exercise:   -Tobacco, alcohol, recreational drug use:   -Sexually active:   -Occupation:     #[employer]   -Diagnosed several years ago, status post thyroid removal.  Since then she has been on Synthroid.  Has had some difficulty controlling; however, currently she is taking 100 to 5 mcg daily except on Fridays where she doubles up her dose.  She states that since then she has been feeling \"stable\".  Denies any increased fatigue, difficulty sleeping, changes to hair or nails, heat intolerance, cold intolerance, diarrhea constipation.  -Requesting labs at this time.    #Umbilical hernia:  -Patient states that she has umbilical hernia.  Last year she had approval to have this corrected; however, with change of insurance she now needs a prior authorization.  -Patient also has a history of inguinal hernia, status post mesh placement.  She is wishing to have this corrected as well as she is having symptoms such as tugging, pulling, slight pain inguinal area.  -Denies any fevers, chills, generalized abdominal pain, nausea, vomiting.    #Anemia of chronic disease:  -Patient has extensive medical history with symptoms that appear to be autoimmune hepatitis; however, was diagnosed with histiocytosis.  She is currently being treated with antihistamines, Singulair, butyryl.  However, during this process she developed anemia of chronic disease, had to go through several iron infusions.  Patient states that she is doing well, has been on appropriate treatment for histiocytosis and has not needed iron infusion for over 5 years.  She is " "denies any lightheadedness, dizziness, shortness of breath, fatigue, syncope.    Allergies   Allergen Reactions   • Imuran [Azathioprine Sodium] Anaphylaxis     Liver enzymes rise significantly   • Sulfasalazine Hives   • Plaquenil [Hydroxychloroquine Sulfate] Hives   • Vicodin [Hydrocodone-Acetaminophen] Itching   • Other Drug Hives     \"Iv contrast\"   • Other Misc Itching     tape         Current medicines (including changes today)  Current Outpatient Medications   Medication Sig Dispense Refill   • Albuterol Sulfate 108 (90 Base) MCG/ACT AEROSOL POWDER, BREATH ACTIVATED Inhale  by mouth.     • albuterol (PROAIR HFA) 108 (90 Base) MCG/ACT Aero Soln inhalation aerosol Inhale 2 Puffs by mouth every 6 hours as needed for Shortness of Breath.     • levothyroxine (SYNTHROID) 175 MCG Tab Take 1 Tab by mouth Every morning on an empty stomach. 90 Tab 1   • ergocalciferol (DRISDOL) 48918 UNIT capsule TAKE ONE CAPSULE BY MOUTH EVERY 7 DAYS 12 Cap 0   • hydrOXYzine HCl (ATARAX) 10 MG Tab TAKE 1 TAB BY MOUTH 3 TIMES A DAY AS NEEDED FOR ITCHING. 90 Tab 3   • montelukast (SINGULAIR) 10 MG Tab TAKE 1 TABLET BY MOUTH EVERY DAY 90 Tab 2   • famotidine (PEPCID) 20 MG Tab Take 1 Tab by mouth 2 times a day. 180 Tab 2   • cetirizine (ZYRTEC) 10 MG Tab Take 10 mg by mouth 2 times a day.     • loratadine (CLARITIN) 10 MG Tab Take 10 mg by mouth every day.  0   • therapeutic multivitamin-minerals (THERAGRAN-M) Tab Take 1 Tab by mouth every day.     • acyclovir (ZOVIRAX) 5 % Ointment APPLY TO COLD SORE 4 TIMES DAILY 30 g 0   • buPROPion (WELLBUTRIN) 100 MG Tab Take 1 Tab by mouth 2 times a day. 180 Tab 1     No current facility-administered medications for this visit.      She  has a past medical history of Anemia, Anesthesia, Arthritis, Asthma, Gynecological disorder, H/O thyroidectomy (11/14/2013), Hashimoto disease, History of back surgery (11/14/2013), History of strabismus (11/14/2013), Liver disease, Mastocytosis, Renal disorder, " "S/P cholecystectomy (11/14/2013), and S/P inguinal hernia repair (11/14/2013).    ROS   Review of Systems   Constitutional: Negative for chills and fever.   HENT: Negative for hearing loss and tinnitus.    Respiratory: Negative for shortness of breath and wheezing.    Cardiovascular: Negative for chest pain and palpitations.   Genitourinary: Negative for dysuria.   Neurological: Negative for dizziness.   Psychiatric/Behavioral: Negative for depression. The patient is not nervous/anxious.         Objective:     Physical Exam:  Pulse 76   Temp 36.8 °C (98.3 °F) (Temporal)   Resp 13   Ht 1.664 m (5' 5.5\")   Wt 86.2 kg (190 lb)   SpO2 97%  Body mass index is 31.14 kg/m².   Constitutional: Alert, no distress.  Skin: Warm, dry, good turgor, no rashes in visible areas.  Eye: Equal, round and reactive, conjunctiva clear, lids normal.  ENMT: TM's clear bilaterally, lips without lesions, good dentition, oropharynx clear.  Neck: Trachea midline, no masses, no thyromegaly. No cervical or supraclavicular lymphadenopathy.  Respiratory: Unlabored respiratory effort, lungs clear to auscultation, no wheezes, no rhonchi.  Cardiovascular: Normal S1, S2, no murmur, no edema.  Abdomen: Soft, non-tender, no masses, no hepatosplenomegaly.  Psych: Alert and oriented x3, normal affect and mood.    Assessment and Plan:     1. Encounter to establish care  -All questions concerns were answered at this time.  -Vaccinations/screenings needed at this time: Influenza vaccine  -Labs reviewed, will recheck labs as below.  -Discussed the importance of a healthy, Mediterranean style diet, routine exercise regimen.  -Discussed general safety measures including seatbelts, helmets, avoidance of smoking, sunscreen, hydration.  -Follow-up for general physical exam on a yearly basis, sooner if needed.    2. Postoperative hypothyroidism  -Status: Stable reviewed labs, TSH completed in January was elevated so glaucoma T4 levels were normal.  We will " recheck levels at this time, continue with Synthroid.  -Follow-up as needed.  - levothyroxine (SYNTHROID) 175 MCG Tab; Take 1 tab po daily Mon.-Sat. Take 2 tab po on Sunday.  Dispense: 112 Tab; Refill: 3  - Comp Metabolic Panel; Future  - CBC WITHOUT DIFFERENTIAL; Future  - TSH WITH REFLEX TO FT4; Future    3. Umbilical hernia without obstruction and without gangrene  - REFERRAL TO PLASTIC SURGERY    4. Anemia in other chronic diseases classified elsewhere  -Status: Stable.  Patient is asymptomatic at this time.  Will check iron levels, will call patient with results.  - IRON/TOTAL IRON BIND; Future      Followup: Return in about 1 year (around 9/4/2021), or if symptoms worsen or fail to improve.         PLEASE NOTE: This dictation was created using voice recognition software. I have made every reasonable attempt to correct obvious errors, but I expect that there are errors of grammar and possibly content that I did not discover before finalizing the note.

## 2020-09-17 ENCOUNTER — HOSPITAL ENCOUNTER (OUTPATIENT)
Dept: LAB | Facility: MEDICAL CENTER | Age: 35
End: 2020-09-17
Attending: FAMILY MEDICINE
Payer: MEDICARE

## 2020-09-17 DIAGNOSIS — D63.8 ANEMIA IN OTHER CHRONIC DISEASES CLASSIFIED ELSEWHERE: ICD-10-CM

## 2020-09-17 DIAGNOSIS — E89.0 POSTOPERATIVE HYPOTHYROIDISM: ICD-10-CM

## 2020-09-17 LAB
ALBUMIN SERPL BCP-MCNC: 4.3 G/DL (ref 3.2–4.9)
ALBUMIN/GLOB SERPL: 1.6 G/DL
ALP SERPL-CCNC: 122 U/L (ref 30–99)
ALT SERPL-CCNC: 24 U/L (ref 2–50)
ANION GAP SERPL CALC-SCNC: 12 MMOL/L (ref 7–16)
AST SERPL-CCNC: 23 U/L (ref 12–45)
BILIRUB SERPL-MCNC: 0.2 MG/DL (ref 0.1–1.5)
BUN SERPL-MCNC: 13 MG/DL (ref 8–22)
CALCIUM SERPL-MCNC: 8.6 MG/DL (ref 8.5–10.5)
CHLORIDE SERPL-SCNC: 105 MMOL/L (ref 96–112)
CO2 SERPL-SCNC: 23 MMOL/L (ref 20–33)
CREAT SERPL-MCNC: 0.61 MG/DL (ref 0.5–1.4)
ERYTHROCYTE [DISTWIDTH] IN BLOOD BY AUTOMATED COUNT: 40.7 FL (ref 35.9–50)
FASTING STATUS PATIENT QL REPORTED: NORMAL
GLOBULIN SER CALC-MCNC: 2.7 G/DL (ref 1.9–3.5)
GLUCOSE SERPL-MCNC: 85 MG/DL (ref 65–99)
HCT VFR BLD AUTO: 40.7 % (ref 37–47)
HGB BLD-MCNC: 12.3 G/DL (ref 12–16)
IRON SATN MFR SERPL: 3 % (ref 15–55)
IRON SERPL-MCNC: 14 UG/DL (ref 40–170)
MCH RBC QN AUTO: 23.2 PG (ref 27–33)
MCHC RBC AUTO-ENTMCNC: 30.2 G/DL (ref 33.6–35)
MCV RBC AUTO: 76.6 FL (ref 81.4–97.8)
PLATELET # BLD AUTO: 324 K/UL (ref 164–446)
PMV BLD AUTO: 10.9 FL (ref 9–12.9)
POTASSIUM SERPL-SCNC: 4 MMOL/L (ref 3.6–5.5)
PROT SERPL-MCNC: 7 G/DL (ref 6–8.2)
RBC # BLD AUTO: 5.31 M/UL (ref 4.2–5.4)
SODIUM SERPL-SCNC: 140 MMOL/L (ref 135–145)
TIBC SERPL-MCNC: 449 UG/DL (ref 250–450)
TSH SERPL DL<=0.005 MIU/L-ACNC: 0.01 UIU/ML (ref 0.38–5.33)
UIBC SERPL-MCNC: 435 UG/DL (ref 110–370)
WBC # BLD AUTO: 6.6 K/UL (ref 4.8–10.8)

## 2020-09-17 PROCEDURE — 80053 COMPREHEN METABOLIC PANEL: CPT

## 2020-09-17 PROCEDURE — 84439 ASSAY OF FREE THYROXINE: CPT

## 2020-09-17 PROCEDURE — 83550 IRON BINDING TEST: CPT

## 2020-09-17 PROCEDURE — 83540 ASSAY OF IRON: CPT

## 2020-09-17 PROCEDURE — 84443 ASSAY THYROID STIM HORMONE: CPT

## 2020-09-17 PROCEDURE — 36415 COLL VENOUS BLD VENIPUNCTURE: CPT

## 2020-09-17 PROCEDURE — 85027 COMPLETE CBC AUTOMATED: CPT

## 2020-09-18 LAB — T4 FREE SERPL-MCNC: 1.11 NG/DL (ref 0.93–1.7)

## 2020-09-23 DIAGNOSIS — E89.0 POSTOPERATIVE HYPOTHYROIDISM: ICD-10-CM

## 2020-09-23 DIAGNOSIS — D63.8 ANEMIA IN OTHER CHRONIC DISEASES CLASSIFIED ELSEWHERE: ICD-10-CM

## 2020-09-23 NOTE — PROGRESS NOTES
Call patient to review the labs.  Patient had a TSH that was low at 0.013.  Free T4 was normal at 1.11.  Patient states that she is feeling well, denies any symptoms of hypo-or hyperthyroid at this time.  We will continue with current medication dosage and repeat TSH in 3 months and reassess at that time.  Patient also showing signs of low iron with iron of 14, unsaturated iron-binding 435.  While her MCV is low at 76.6, hemoglobin hematocrit remained normal.  Patient is asymptomatic at this time.  She will begin taking iron supplement at this time, recheck CBC in 3 months.  Patient understood and agreed with current treatment plan.

## 2020-12-14 DIAGNOSIS — E89.0 POSTOPERATIVE HYPOTHYROIDISM: ICD-10-CM

## 2020-12-14 RX ORDER — LEVOTHYROXINE SODIUM 175 UG/1
TABLET ORAL
Qty: 112 TAB | Refills: 3 | Status: SHIPPED | OUTPATIENT
Start: 2020-12-14 | End: 2021-12-27

## 2020-12-14 NOTE — TELEPHONE ENCOUNTER
Prescription Question    Annamaria Babin Russell T, M.D. 1 hour ago (12:43 PM)        I need my thyroid medicine refilled.

## 2020-12-14 NOTE — TELEPHONE ENCOUNTER
Received request via: Patient    Was the patient seen in the last year in this department? Yes LOV 9/4/2020    Does the patient have an active prescription (recently filled or refills available) for medication(s) requested? No

## 2020-12-15 ENCOUNTER — PATIENT MESSAGE (OUTPATIENT)
Dept: MEDICAL GROUP | Facility: LAB | Age: 35
End: 2020-12-15

## 2020-12-15 DIAGNOSIS — K42.9 UMBILICAL HERNIA WITHOUT OBSTRUCTION AND WITHOUT GANGRENE: ICD-10-CM

## 2021-01-15 ENCOUNTER — HOSPITAL ENCOUNTER (OUTPATIENT)
Dept: LAB | Facility: MEDICAL CENTER | Age: 36
End: 2021-01-15
Attending: FAMILY MEDICINE
Payer: MEDICARE

## 2021-01-15 ENCOUNTER — OFFICE VISIT (OUTPATIENT)
Dept: MEDICAL GROUP | Facility: LAB | Age: 36
End: 2021-01-15
Payer: MEDICARE

## 2021-01-15 VITALS
WEIGHT: 200 LBS | BODY MASS INDEX: 32.14 KG/M2 | RESPIRATION RATE: 12 BRPM | SYSTOLIC BLOOD PRESSURE: 120 MMHG | HEART RATE: 90 BPM | DIASTOLIC BLOOD PRESSURE: 78 MMHG | TEMPERATURE: 97.9 F | HEIGHT: 66 IN | OXYGEN SATURATION: 98 %

## 2021-01-15 DIAGNOSIS — E89.0 POSTOPERATIVE HYPOTHYROIDISM: ICD-10-CM

## 2021-01-15 DIAGNOSIS — D63.8 ANEMIA IN OTHER CHRONIC DISEASES CLASSIFIED ELSEWHERE: ICD-10-CM

## 2021-01-15 DIAGNOSIS — Z23 NEED FOR VACCINATION: ICD-10-CM

## 2021-01-15 DIAGNOSIS — D47.09 MAST CELL DISEASE: ICD-10-CM

## 2021-01-15 LAB
ERYTHROCYTE [DISTWIDTH] IN BLOOD BY AUTOMATED COUNT: 43.1 FL (ref 35.9–50)
HCT VFR BLD AUTO: 43.7 % (ref 37–47)
HGB BLD-MCNC: 13 G/DL (ref 12–16)
MCH RBC QN AUTO: 23.8 PG (ref 27–33)
MCHC RBC AUTO-ENTMCNC: 29.7 G/DL (ref 33.6–35)
MCV RBC AUTO: 80 FL (ref 81.4–97.8)
PLATELET # BLD AUTO: 283 K/UL (ref 164–446)
PMV BLD AUTO: 10.9 FL (ref 9–12.9)
RBC # BLD AUTO: 5.46 M/UL (ref 4.2–5.4)
WBC # BLD AUTO: 5.5 K/UL (ref 4.8–10.8)

## 2021-01-15 PROCEDURE — 84439 ASSAY OF FREE THYROXINE: CPT

## 2021-01-15 PROCEDURE — 84443 ASSAY THYROID STIM HORMONE: CPT

## 2021-01-15 PROCEDURE — 36415 COLL VENOUS BLD VENIPUNCTURE: CPT

## 2021-01-15 PROCEDURE — G0008 ADMIN INFLUENZA VIRUS VAC: HCPCS | Performed by: FAMILY MEDICINE

## 2021-01-15 PROCEDURE — 99213 OFFICE O/P EST LOW 20 MIN: CPT | Mod: 25 | Performed by: FAMILY MEDICINE

## 2021-01-15 PROCEDURE — 90686 IIV4 VACC NO PRSV 0.5 ML IM: CPT | Performed by: FAMILY MEDICINE

## 2021-01-15 PROCEDURE — 85027 COMPLETE CBC AUTOMATED: CPT

## 2021-01-15 PROCEDURE — 80053 COMPREHEN METABOLIC PANEL: CPT

## 2021-01-15 RX ORDER — CETIRIZINE HYDROCHLORIDE 10 MG/1
10 TABLET ORAL 2 TIMES DAILY
Qty: 90 TAB | Refills: 3 | Status: SHIPPED | OUTPATIENT
Start: 2021-01-15 | End: 2022-03-23 | Stop reason: SDUPTHER

## 2021-01-15 ASSESSMENT — PATIENT HEALTH QUESTIONNAIRE - PHQ9
SUM OF ALL RESPONSES TO PHQ9 QUESTIONS 1 AND 2: 0
1. LITTLE INTEREST OR PLEASURE IN DOING THINGS: NOT AT ALL
2. FEELING DOWN, DEPRESSED, IRRITABLE, OR HOPELESS: NOT AT ALL

## 2021-01-15 ASSESSMENT — ENCOUNTER SYMPTOMS
WHEEZING: 0
INSOMNIA: 0
NERVOUS/ANXIOUS: 0
CHILLS: 0
SHORTNESS OF BREATH: 0
DEPRESSION: 0
PALPITATIONS: 0
FEVER: 0
WEIGHT LOSS: 0

## 2021-01-15 ASSESSMENT — FIBROSIS 4 INDEX: FIB4 SCORE: 0.52

## 2021-01-15 NOTE — PROGRESS NOTES
"Subjective:   Annamaria Babin is a 35 y.o. female here today for   Chief Complaint   Patient presents with   • Thyroid Follow-up         ***    Allergies   Allergen Reactions   • Imuran [Azathioprine Sodium] Anaphylaxis     Liver enzymes rise significantly   • Sulfasalazine Hives   • Plaquenil [Hydroxychloroquine Sulfate] Hives   • Vicodin [Hydrocodone-Acetaminophen] Itching   • Other Drug Hives     \"Iv contrast\"   • Other Misc Itching     tape         Current medicines (including changes today)  Current Outpatient Medications   Medication Sig Dispense Refill   • levothyroxine (SYNTHROID) 175 MCG Tab Take 1 tab po daily Mon.-Sat. Take 2 tab po on Sunday. 112 Tab 3   • Albuterol Sulfate 108 (90 Base) MCG/ACT AEROSOL POWDER, BREATH ACTIVATED Inhale  by mouth.     • albuterol (PROAIR HFA) 108 (90 Base) MCG/ACT Aero Soln inhalation aerosol Inhale 2 Puffs by mouth every 6 hours as needed for Shortness of Breath.     • ergocalciferol (DRISDOL) 37179 UNIT capsule TAKE ONE CAPSULE BY MOUTH EVERY 7 DAYS 12 Cap 0   • hydrOXYzine HCl (ATARAX) 10 MG Tab TAKE 1 TAB BY MOUTH 3 TIMES A DAY AS NEEDED FOR ITCHING. 90 Tab 3   • montelukast (SINGULAIR) 10 MG Tab TAKE 1 TABLET BY MOUTH EVERY DAY 90 Tab 2   • famotidine (PEPCID) 20 MG Tab Take 1 Tab by mouth 2 times a day. 180 Tab 2   • acyclovir (ZOVIRAX) 5 % Ointment APPLY TO COLD SORE 4 TIMES DAILY 30 g 0   • cetirizine (ZYRTEC) 10 MG Tab Take 10 mg by mouth 2 times a day.     • loratadine (CLARITIN) 10 MG Tab Take 10 mg by mouth every day.  0   • therapeutic multivitamin-minerals (THERAGRAN-M) Tab Take 1 Tab by mouth every day.       No current facility-administered medications for this visit.      She  has a past medical history of Anemia, Anesthesia, Arthritis, Asthma, Gynecological disorder, H/O thyroidectomy (11/14/2013), Hashimoto disease, History of back surgery (11/14/2013), History of strabismus (11/14/2013), Liver disease, Mastocytosis, Renal disorder, S/P " "cholecystectomy (11/14/2013), and S/P inguinal hernia repair (11/14/2013).    ROS   ROS       Objective:     Physical Exam:  /78   Pulse 90   Temp 36.6 °C (97.9 °F)   Resp 12   Ht 1.664 m (5' 5.5\")   Wt 90.7 kg (200 lb)   SpO2 98%  Body mass index is 32.78 kg/m². ***  Constitutional: Alert, no distress.  Skin: Warm, dry, good turgor, no rashes in visible areas.  Eye: Equal, round and reactive, conjunctiva clear, lids normal.  ENMT: TM's clear bilaterally, lips without lesions, good dentition, oropharynx clear.  Neck: Trachea midline, no masses, no thyromegaly. No cervical or supraclavicular lymphadenopathy.  Respiratory: Unlabored respiratory effort, lungs clear to auscultation, no wheezes, no rhonchi.  Cardiovascular: Normal S1, S2, no murmur, no edema.  Abdomen: Soft, non-tender, no masses, no hepatosplenomegaly.  Psych: Alert and oriented x3, normal affect and mood.    Assessment and Plan:     There are no diagnoses linked to this encounter.    Followup: No follow-ups on file.         PLEASE NOTE: This dictation was created using voice recognition software. I have made every reasonable attempt to correct obvious errors, but I expect that there are errors of grammar and possibly content that I did not discover before finalizing the note.  "

## 2021-01-15 NOTE — PROGRESS NOTES
"Subjective:   Annamaria Babin is a 35 y.o. female here today for   Chief Complaint   Patient presents with   • Thyroid Follow-up       Patient 35-year-old female past medical history hypothyroidism, histiocytosis, anemia chronic disease presented today for follow-up of thyroid as well as with acute complaints of skin dryness, increased fatigue.  Patient currently is on Synthroid 175 mcg daily and is compliant with medications.  She is also taking over-the-counter iron supplements.  She states that she recently has observed over the last 2 months an increase in fatigue.  She is also noticed some drying of skin especially around her hairline on her face.  He denies any shortness of breath, syncope, hot intolerance, cold intolerance, tremors, constipation, diarrhea.  She is here today to discuss repeat lab testing at this time for medication adjustment.      Allergies   Allergen Reactions   • Imuran [Azathioprine Sodium] Anaphylaxis     Liver enzymes rise significantly   • Sulfasalazine Hives   • Plaquenil [Hydroxychloroquine Sulfate] Hives   • Vicodin [Hydrocodone-Acetaminophen] Itching   • Other Drug Hives     \"Iv contrast\"   • Other Misc Itching     tape         Current medicines (including changes today)  Current Outpatient Medications   Medication Sig Dispense Refill   • cetirizine (ZYRTEC) 10 MG Tab Take 1 Tab by mouth 2 times a day. 90 Tab 3   • levothyroxine (SYNTHROID) 175 MCG Tab Take 1 tab po daily Mon.-Sat. Take 2 tab po on Sunday. 112 Tab 3   • Albuterol Sulfate 108 (90 Base) MCG/ACT AEROSOL POWDER, BREATH ACTIVATED Inhale  by mouth.     • albuterol (PROAIR HFA) 108 (90 Base) MCG/ACT Aero Soln inhalation aerosol Inhale 2 Puffs by mouth every 6 hours as needed for Shortness of Breath.     • ergocalciferol (DRISDOL) 19520 UNIT capsule TAKE ONE CAPSULE BY MOUTH EVERY 7 DAYS 12 Cap 0   • hydrOXYzine HCl (ATARAX) 10 MG Tab TAKE 1 TAB BY MOUTH 3 TIMES A DAY AS NEEDED FOR ITCHING. 90 Tab 3   • montelukast " "(SINGULAIR) 10 MG Tab TAKE 1 TABLET BY MOUTH EVERY DAY 90 Tab 2   • famotidine (PEPCID) 20 MG Tab Take 1 Tab by mouth 2 times a day. 180 Tab 2   • acyclovir (ZOVIRAX) 5 % Ointment APPLY TO COLD SORE 4 TIMES DAILY 30 g 0   • loratadine (CLARITIN) 10 MG Tab Take 10 mg by mouth every day.  0   • therapeutic multivitamin-minerals (THERAGRAN-M) Tab Take 1 Tab by mouth every day.       No current facility-administered medications for this visit.      She  has a past medical history of Anemia, Anesthesia, Arthritis, Asthma, Gynecological disorder, H/O thyroidectomy (11/14/2013), Hashimoto disease, History of back surgery (11/14/2013), History of strabismus (11/14/2013), Liver disease, Mastocytosis, Renal disorder, S/P cholecystectomy (11/14/2013), and S/P inguinal hernia repair (11/14/2013).    ROS   Review of Systems   Constitutional: Negative for chills, fever, malaise/fatigue and weight loss.   Respiratory: Negative for shortness of breath and wheezing.    Cardiovascular: Negative for chest pain and palpitations.   Skin: Negative for rash.   Psychiatric/Behavioral: Negative for depression. The patient is not nervous/anxious and does not have insomnia.       Objective:     Physical Exam:  /78   Pulse 90   Temp 36.6 °C (97.9 °F)   Resp 12   Ht 1.664 m (5' 5.5\")   Wt 90.7 kg (200 lb)   SpO2 98%  Body mass index is 32.78 kg/m².   Constitutional: Alert, no distress.  Skin: Warm, dry, good turgor, no rashes in visible areas.  Eye: Equal, round and reactive, conjunctiva clear, lids normal.  ENMT: TM's clear bilaterally, lips without lesions, good dentition, oropharynx clear.  Neck: Trachea midline, no masses, no thyromegaly. No cervical or supraclavicular lymphadenopathy.  Respiratory: Unlabored respiratory effort, lungs clear to auscultation, no wheezes, no rhonchi.  Cardiovascular: Normal S1, S2, no murmur, no edema.  Abdomen: Soft, non-tender, no masses, no hepatosplenomegaly.  Psych: Alert and oriented x3, " normal affect and mood.    Assessment and Plan:     1. Anemia in other chronic diseases classified elsewhere  -We will recheck labs at this time.  Encouraged continuation of iron supplementation, increase iron daily diet with dark green, leafy vegetables.  -We will call patient with results, adjust medications if needed.  - CBC WITHOUT DIFFERENTIAL; Future    2. Postoperative hypothyroidism  -Physical examination is benign at this time.  Will recheck labs and readjust medications if needed.  Encourage patient remember to continue to take thyroid medication alone in the morning, half hour before other medications.  - TSH WITH REFLEX TO FT4; Future  - Comp Metabolic Panel; Future    3. Mast cell disease  -Status: Stable.  Will refill Zyrtec at this time.  - cetirizine (ZYRTEC) 10 MG Tab; Take 1 Tab by mouth 2 times a day.  Dispense: 90 Tab; Refill: 3    4. Need for vaccination  -Patient was agreeable to receiving the influenza vaccine in clinic today after discussion of potential risks, benefits, and side effects. Vaccine was administered without adverse effects.  - Influenza Vaccine Quad Injection (PF)      Followup: Return if symptoms worsen or fail to improve.         PLEASE NOTE: This dictation was created using voice recognition software. I have made every reasonable attempt to correct obvious errors, but I expect that there are errors of grammar and possibly content that I did not discover before finalizing the note.

## 2021-01-16 LAB
ALBUMIN SERPL BCP-MCNC: 4.4 G/DL (ref 3.2–4.9)
ALBUMIN/GLOB SERPL: 1.5 G/DL
ALP SERPL-CCNC: 96 U/L (ref 30–99)
ALT SERPL-CCNC: 12 U/L (ref 2–50)
ANION GAP SERPL CALC-SCNC: 11 MMOL/L (ref 7–16)
AST SERPL-CCNC: 17 U/L (ref 12–45)
BILIRUB SERPL-MCNC: 0.4 MG/DL (ref 0.1–1.5)
BUN SERPL-MCNC: 17 MG/DL (ref 8–22)
CALCIUM SERPL-MCNC: 9 MG/DL (ref 8.5–10.5)
CHLORIDE SERPL-SCNC: 102 MMOL/L (ref 96–112)
CO2 SERPL-SCNC: 24 MMOL/L (ref 20–33)
CREAT SERPL-MCNC: 0.66 MG/DL (ref 0.5–1.4)
GLOBULIN SER CALC-MCNC: 2.9 G/DL (ref 1.9–3.5)
GLUCOSE SERPL-MCNC: 97 MG/DL (ref 65–99)
POTASSIUM SERPL-SCNC: 4.3 MMOL/L (ref 3.6–5.5)
PROT SERPL-MCNC: 7.3 G/DL (ref 6–8.2)
SODIUM SERPL-SCNC: 137 MMOL/L (ref 135–145)
T4 FREE SERPL-MCNC: 1.49 NG/DL (ref 0.93–1.7)
TSH SERPL DL<=0.005 MIU/L-ACNC: 0.02 UIU/ML (ref 0.38–5.33)

## 2021-01-25 ENCOUNTER — TELEPHONE (OUTPATIENT)
Dept: MEDICAL GROUP | Facility: LAB | Age: 36
End: 2021-01-25

## 2021-01-25 DIAGNOSIS — L50.3 DERMATOGRAPHIC URTICARIA: ICD-10-CM

## 2021-01-25 DIAGNOSIS — K21.9 GASTROESOPHAGEAL REFLUX DISEASE, UNSPECIFIED WHETHER ESOPHAGITIS PRESENT: ICD-10-CM

## 2021-01-25 DIAGNOSIS — D47.09 MAST CELL DISEASE: ICD-10-CM

## 2021-01-25 DIAGNOSIS — J45.20 MILD INTERMITTENT ASTHMA WITHOUT COMPLICATION: ICD-10-CM

## 2021-01-25 RX ORDER — MONTELUKAST SODIUM 10 MG/1
10 TABLET ORAL
Qty: 90 TAB | Refills: 2 | Status: SHIPPED | OUTPATIENT
Start: 2021-01-25 | End: 2022-03-23 | Stop reason: SDUPTHER

## 2021-01-25 RX ORDER — RANITIDINE 150 MG/1
150 TABLET ORAL 2 TIMES DAILY
Qty: 180 TAB | Refills: 3 | Status: SHIPPED | OUTPATIENT
Start: 2021-01-25 | End: 2021-01-27

## 2021-01-25 RX ORDER — HYDROXYZINE HYDROCHLORIDE 10 MG/1
10 TABLET, FILM COATED ORAL 3 TIMES DAILY PRN
Qty: 90 TAB | Refills: 3 | Status: SHIPPED | OUTPATIENT
Start: 2021-01-25 | End: 2021-06-09

## 2021-01-25 RX ORDER — ALBUTEROL SULFATE 90 UG/1
2 AEROSOL, METERED RESPIRATORY (INHALATION) EVERY 6 HOURS PRN
Qty: 8.5 G | Refills: 6 | Status: SHIPPED | OUTPATIENT
Start: 2021-01-25 | End: 2021-10-20

## 2021-01-25 NOTE — TELEPHONE ENCOUNTER
Received request via: Patient    Was the patient seen in the last year in this department? Yes  1/15/2021  Does the patient have an active prescription (recently filled or refills available) for medication(s) requested? No    ----- Message from Annamaria Babin sent at 2021  6:05 PM PST -----  Regarding: Prescription Question  May I have refills on Albuterol, Cingular, Zyrtec Hydroxyzine, and Zantac? I tried to  my medicine and everything is  under my previous primary care Dr. GOOD Titus.

## 2021-01-26 NOTE — TELEPHONE ENCOUNTER
1. Caller Name: [facility]                               Call Back Number: [phone]      How would the patient prefer to be contacted with a response: Phone call do NOT leave a detailed message    Pharmacy called and spoke to me, regards patient raNITidine (ZANTAC) 150 MG Tab prescription. They need an alterative since this is off the market. Pharmacy recommended famotidine if acceptable.

## 2021-01-27 RX ORDER — FAMOTIDINE 20 MG/1
20 TABLET, FILM COATED ORAL 2 TIMES DAILY
Qty: 60 TAB | Refills: 11 | Status: SHIPPED | OUTPATIENT
Start: 2021-01-27

## 2021-01-28 ENCOUNTER — HOSPITAL ENCOUNTER (OUTPATIENT)
Dept: RADIOLOGY | Facility: MEDICAL CENTER | Age: 36
End: 2021-01-28
Attending: SURGERY
Payer: MEDICARE

## 2021-01-28 DIAGNOSIS — R10.31 RIGHT GROIN PAIN: ICD-10-CM

## 2021-01-28 DIAGNOSIS — M62.08 DIASTASIS OF RECTUS ABDOMINIS: ICD-10-CM

## 2021-01-28 PROCEDURE — 76705 ECHO EXAM OF ABDOMEN: CPT

## 2021-06-09 DIAGNOSIS — D47.09 MAST CELL DISEASE: ICD-10-CM

## 2021-06-09 DIAGNOSIS — L50.3 DERMATOGRAPHIC URTICARIA: ICD-10-CM

## 2021-06-09 RX ORDER — HYDROXYZINE HYDROCHLORIDE 10 MG/1
TABLET, FILM COATED ORAL
Qty: 90 TABLET | Refills: 3 | Status: SHIPPED | OUTPATIENT
Start: 2021-06-09 | End: 2021-10-25

## 2021-06-09 NOTE — TELEPHONE ENCOUNTER
Received request via: Pharmacy    Was the patient seen in the last year in this department? Yes  LOV 01/15/2021  Does the patient have an active prescription (recently filled or refills available) for medication(s) requested? No

## 2021-08-26 ENCOUNTER — TELEPHONE (OUTPATIENT)
Dept: MEDICAL GROUP | Facility: LAB | Age: 36
End: 2021-08-26

## 2021-08-26 NOTE — TELEPHONE ENCOUNTER
APPOINTMENT SCHEDULING  Patient overdue for appointment. Called patient to schedule appointment.  Phone Number Called: [phone]    Call outcome: spoke w/pt and she will call back to schedule AWV

## 2021-08-28 ENCOUNTER — PATIENT MESSAGE (OUTPATIENT)
Dept: HEALTH INFORMATION MANAGEMENT | Facility: OTHER | Age: 36
End: 2021-08-28

## 2021-10-20 RX ORDER — ALBUTEROL SULFATE 90 UG/1
AEROSOL, METERED RESPIRATORY (INHALATION)
Qty: 8.5 EACH | Refills: 6 | Status: SHIPPED | OUTPATIENT
Start: 2021-10-20

## 2021-12-27 DIAGNOSIS — E89.0 POSTOPERATIVE HYPOTHYROIDISM: ICD-10-CM

## 2021-12-27 RX ORDER — LEVOTHYROXINE SODIUM 175 UG/1
TABLET ORAL
Qty: 112 TABLET | Refills: 3 | Status: SHIPPED | OUTPATIENT
Start: 2021-12-27 | End: 2022-03-18

## 2022-01-20 ENCOUNTER — PATIENT MESSAGE (OUTPATIENT)
Dept: HEALTH INFORMATION MANAGEMENT | Facility: OTHER | Age: 37
End: 2022-01-20
Payer: MEDICARE

## 2022-03-18 ENCOUNTER — OFFICE VISIT (OUTPATIENT)
Dept: MEDICAL GROUP | Facility: MEDICAL CENTER | Age: 37
End: 2022-03-18
Payer: MEDICARE

## 2022-03-18 ENCOUNTER — HOSPITAL ENCOUNTER (OUTPATIENT)
Facility: MEDICAL CENTER | Age: 37
End: 2022-03-18
Attending: INTERNAL MEDICINE
Payer: MEDICARE

## 2022-03-18 VITALS
TEMPERATURE: 98.3 F | RESPIRATION RATE: 98 BRPM | HEART RATE: 76 BPM | BODY MASS INDEX: 33.8 KG/M2 | WEIGHT: 198 LBS | HEIGHT: 64 IN | DIASTOLIC BLOOD PRESSURE: 66 MMHG | SYSTOLIC BLOOD PRESSURE: 118 MMHG

## 2022-03-18 DIAGNOSIS — D47.09 MAST CELL DISEASE: ICD-10-CM

## 2022-03-18 DIAGNOSIS — E89.0 POSTOPERATIVE HYPOTHYROIDISM: ICD-10-CM

## 2022-03-18 DIAGNOSIS — M89.8X9 ABNORMAL BONE FORMATION: ICD-10-CM

## 2022-03-18 DIAGNOSIS — E78.00 PURE HYPERCHOLESTEROLEMIA: ICD-10-CM

## 2022-03-18 DIAGNOSIS — E61.1 IRON DEFICIENCY: ICD-10-CM

## 2022-03-18 DIAGNOSIS — N02.9 THIN BASEMENT MEMBRANE NEPHROPATHY: ICD-10-CM

## 2022-03-18 DIAGNOSIS — Z82.49 FAMILY HISTORY OF ARTERIAL DISEASE: ICD-10-CM

## 2022-03-18 DIAGNOSIS — J45.20 MILD INTERMITTENT ASTHMA WITHOUT COMPLICATION: ICD-10-CM

## 2022-03-18 DIAGNOSIS — F33.2 SEVERE EPISODE OF RECURRENT MAJOR DEPRESSIVE DISORDER, WITHOUT PSYCHOTIC FEATURES (HCC): ICD-10-CM

## 2022-03-18 DIAGNOSIS — E55.9 VITAMIN D INSUFFICIENCY: ICD-10-CM

## 2022-03-18 PROCEDURE — 81001 URINALYSIS AUTO W/SCOPE: CPT

## 2022-03-18 PROCEDURE — 99215 OFFICE O/P EST HI 40 MIN: CPT | Performed by: INTERNAL MEDICINE

## 2022-03-18 RX ORDER — LEVOTHYROXINE SODIUM 0.2 MG/1
200 TABLET ORAL
Qty: 30 TABLET | Refills: 1 | Status: SHIPPED | OUTPATIENT
Start: 2022-03-18 | End: 2022-04-12

## 2022-03-18 RX ORDER — BUPROPION HYDROCHLORIDE 75 MG/1
75 TABLET ORAL 2 TIMES DAILY
Qty: 60 TABLET | Refills: 1 | Status: SHIPPED | OUTPATIENT
Start: 2022-03-18 | End: 2022-04-12

## 2022-03-18 ASSESSMENT — PATIENT HEALTH QUESTIONNAIRE - PHQ9
SUM OF ALL RESPONSES TO PHQ9 QUESTIONS 1 AND 2: 0
1. LITTLE INTEREST OR PLEASURE IN DOING THINGS: NOT AT ALL
2. FEELING DOWN, DEPRESSED, IRRITABLE, OR HOPELESS: NOT AT ALL
3. TROUBLE FALLING OR STAYING ASLEEP OR SLEEPING TOO MUCH: NEARLY EVERY DAY
4. FEELING TIRED OR HAVING LITTLE ENERGY: NEARLY EVERY DAY
9. THOUGHTS THAT YOU WOULD BE BETTER OFF DEAD, OR OF HURTING YOURSELF: NOT AT ALL
8. MOVING OR SPEAKING SO SLOWLY THAT OTHER PEOPLE COULD HAVE NOTICED. OR THE OPPOSITE, BEING SO FIGETY OR RESTLESS THAT YOU HAVE BEEN MOVING AROUND A LOT MORE THAN USUAL: NOT AT ALL
6. FEELING BAD ABOUT YOURSELF - OR THAT YOU ARE A FAILURE OR HAVE LET YOURSELF OR YOUR FAMILY DOWN: NOT AL ALL
SUM OF ALL RESPONSES TO PHQ QUESTIONS 1-9: 6
7. TROUBLE CONCENTRATING ON THINGS, SUCH AS READING THE NEWSPAPER OR WATCHING TELEVISION: NOT AT ALL
5. POOR APPETITE OR OVEREATING: NOT AT ALL

## 2022-03-18 ASSESSMENT — FIBROSIS 4 INDEX: FIB4 SCORE: 0.66

## 2022-03-18 NOTE — ASSESSMENT & PLAN NOTE
She lost her mother and her brother last year. Her  cheated on her with her best friend. She is not doing well mentally and would like to restart wellbutrin.

## 2022-03-18 NOTE — ASSESSMENT & PLAN NOTE
Could not control hashimoto's disease due to goiter / local swelling / compression symptoms, thyroid level fluctuations. Total thyroidectomy in 2011. She was on 2 tabs x 112 mcg daily. Her levels were high so, it was reduced to 175 mcg x 8 tabs in 2021. She ran out 2 days ago.  She is having mood fluctuations, menorrhagia Q14 days, fatigue, losing hair, nail issues and gaining weight 25 lbs.

## 2022-03-19 LAB
APPEARANCE UR: ABNORMAL
BACTERIA #/AREA URNS HPF: ABNORMAL /HPF
BILIRUB UR QL STRIP.AUTO: NEGATIVE
COLOR UR: YELLOW
EPI CELLS #/AREA URNS HPF: ABNORMAL /HPF
GLUCOSE UR STRIP.AUTO-MCNC: NEGATIVE MG/DL
HYALINE CASTS #/AREA URNS LPF: ABNORMAL /LPF
KETONES UR STRIP.AUTO-MCNC: NEGATIVE MG/DL
LEUKOCYTE ESTERASE UR QL STRIP.AUTO: ABNORMAL
MICRO URNS: ABNORMAL
NITRITE UR QL STRIP.AUTO: NEGATIVE
PH UR STRIP.AUTO: 6.5 [PH] (ref 5–8)
PROT UR QL STRIP: NEGATIVE MG/DL
RBC # URNS HPF: ABNORMAL /HPF
RBC UR QL AUTO: NEGATIVE
SP GR UR STRIP.AUTO: 1.01
UROBILINOGEN UR STRIP.AUTO-MCNC: 0.2 MG/DL
WBC #/AREA URNS HPF: ABNORMAL /HPF

## 2022-03-19 NOTE — PROGRESS NOTES
Established Patient    Annamaria presents today with the following:    CC: Multiple medical problems    HPI:   Annamaria is a 37 y.o. female who came in for above.    Postoperative hypothyroidism  Could not control hashimoto's disease due to goiter / local swelling / compression symptoms, thyroid level fluctuations. Total thyroidectomy in 2011. She was on 2 tabs x 112 mcg daily. Her levels were high so, it was reduced to 175 mcg x 8 tabs in 2021. She ran out 2 days ago.  She is having mood fluctuations, menorrhagia Q14 days, fatigue, losing hair, nail issues and gaining weight 25 lbs.     Severe episode of recurrent major depressive disorder, without psychotic features (HCC)  She lost her mother and her brother last year. Her  cheated on her with her best friend. 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 On antihistamines.  Ruled out carcinoid, autoimmunue hepatitis, SLE by [facility].  Normal BM biopsy by [facility] in [place].   Currently her symptoms are not controlled.  She continues to have hives and bowel symptoms.  She would like to check in with local allergy specialist for further options for treatment.    ROS:   As above    Patient Active Problem List    Diagnosis Date Noted   • Family history of arterial disease 03/18/2022   • Severe episode of recurrent major depressive disorder, without psychotic features (HCC) 01/22/2019   • Vitamin D insufficiency 10/01/2018   • Postoperative hypothyroidism 01/15/2016   • Mild intermittent asthma 09/28/2015   • Mast cell disease 11/19/2014   • Dermatographic urticaria 11/14/2013   • Migraine headache 11/14/2013   • Autoimmune disorder (HCC) 05/17/2012   • Hashimoto's thyroiditis 05/17/2012   • Thin basement membrane nephropathy 05/17/2012       Current Outpatient Medications   Medication Sig Dispense Refill   •  "levothyroxine (SYNTHROID) 200 MCG Tab Take 1 Tablet by mouth every morning on an empty stomach. 30 Tablet 1   • buPROPion (WELLBUTRIN) 75 MG Tab Take 1 Tablet by mouth 2 times a day. 60 Tablet 1   • hydrOXYzine HCl (ATARAX) 10 MG Tab TAKE 1 TABLET BY MOUTH THREE TIMES A DAY AS NEEDED 270 Tablet 1   • albuterol 108 (90 Base) MCG/ACT Aero Soln inhalation aerosol INHALE 2 PUFFS BY MOUTH EVERY 6 HOURS AS NEEDED FOR SHORTNESS OF BREATH 8.5 Each 6   • famotidine (PEPCID) 20 MG Tab Take 1 Tab by mouth 2 times a day. 60 Tab 11   • montelukast (SINGULAIR) 10 MG Tab Take 1 Tab by mouth every day. 90 Tab 2   • cetirizine (ZYRTEC) 10 MG Tab Take 1 Tab by mouth 2 times a day. 90 Tab 3   • acyclovir (ZOVIRAX) 5 % Ointment APPLY TO COLD SORE 4 TIMES DAILY 30 g 0   • loratadine (CLARITIN) 10 MG Tab Take 10 mg by mouth every day.  0   • therapeutic multivitamin-minerals (THERAGRAN-M) Tab Take 1 Tab by mouth every day.       No current facility-administered medications for this visit.         /66 (BP Location: Left arm, Patient Position: Sitting, BP Cuff Size: Adult)   Pulse 76   Temp 36.8 °C (98.3 °F)   Resp (!) 98   Ht 1.626 m (5' 4\")   Wt 89.8 kg (198 lb)   BMI 33.99 kg/m²     Physical Exam  General: Alert and oriented, No apparent distress.  Neck: Supple. No cervical or supraclavicular lymphadenopathy noted. Thyroid not enlarged.  Lungs: Clear to auscultation bilaterally without any wheezing, crepitations.  Cardiovascular: Regular rate and rhythm. No murmurs, rubs or gallops.  Abdomen: Bowel sound +, soft, non tender, no rebound or guarding, no palpable organomegaly  Extremities: No  edema.       Assessment and Plan    1. Postoperative hypothyroidism  - TSH WITH REFLEX TO FT4; Future  - start levothyroxine (SYNTHROID) 200 MCG Tab; Take 1 Tablet by mouth every morning on an empty stomach.  Dispense: 30 Tablet; Refill: 1    2. Severe episode of recurrent major depressive disorder, without psychotic features (HCC)  - " restart buPROPion (WELLBUTRIN) 75 MG Tab; Take 1 Tablet by mouth 2 times a day.  Dispense: 60 Tablet; Refill: 1    3. Mast cell disease  - CBC WITH DIFFERENTIAL; Future  - Comp Metabolic Panel; Future  - Referral to Allergy    4. Family history of arterial disease  -Her mother and maternal aunt had severe atherosclerosis disease and what sounds like arteritis (?  Buerger's disease).   -She does not have any symptoms of arterial insufficiency currently.  I will look into this further and get back to her.    5. Vitamin D insufficiency  - VITAMIN D,25 HYDROXY; Future    6. Thin basement membrane nephropathy  - MICROALBUMIN CREAT RATIO URINE; Future  - URINALYSIS; Future    7. Mild intermittent asthma without complication  - stable on albuterol.    8. Iron deficiency  Used to get infusion.  Currently taking multivitamin with oral iron.     - FERRITIN; Future  - IRON/TOTAL IRON BIND; Future    9. Pure hypercholesterolemia  - Lipid Profile; Future    10. Abnormal bone formation of right ankle  She had growth plate fracture on left ankle several years ago.  Since then, she has developed abnormal bone growth which is currently hurting.  - Referral to Orthopedics        Return in about 1 month (around 4/18/2022). after labs    My total time spent caring for the patient on the day of the encounter was 45 minutes.   This does not include time spent on separately billable procedures/tests.      Signed by: Deirdre Cano M.D.

## 2022-03-23 DIAGNOSIS — L50.3 DERMATOGRAPHIC URTICARIA: ICD-10-CM

## 2022-03-23 DIAGNOSIS — D47.09 MAST CELL DISEASE: ICD-10-CM

## 2022-03-23 DIAGNOSIS — J45.20 MILD INTERMITTENT ASTHMA WITHOUT COMPLICATION: ICD-10-CM

## 2022-03-23 RX ORDER — LORATADINE 10 MG/1
10 TABLET ORAL
Qty: 90 TABLET | Refills: 1 | Status: SHIPPED | OUTPATIENT
Start: 2022-03-23 | End: 2022-09-23

## 2022-03-23 RX ORDER — CETIRIZINE HYDROCHLORIDE 10 MG/1
10 TABLET ORAL 2 TIMES DAILY
Qty: 180 TABLET | Refills: 3 | Status: SHIPPED | OUTPATIENT
Start: 2022-03-23

## 2022-03-23 RX ORDER — MONTELUKAST SODIUM 10 MG/1
10 TABLET ORAL
Qty: 90 TABLET | Refills: 3 | Status: SHIPPED | OUTPATIENT
Start: 2022-03-23

## 2022-03-24 ENCOUNTER — HOSPITAL ENCOUNTER (OUTPATIENT)
Dept: LAB | Facility: MEDICAL CENTER | Age: 37
End: 2022-03-24
Attending: INTERNAL MEDICINE
Payer: MEDICARE

## 2022-03-24 DIAGNOSIS — E61.1 IRON DEFICIENCY: ICD-10-CM

## 2022-03-24 DIAGNOSIS — E89.0 POSTOPERATIVE HYPOTHYROIDISM: ICD-10-CM

## 2022-03-24 DIAGNOSIS — D47.09 MAST CELL DISEASE: ICD-10-CM

## 2022-03-24 DIAGNOSIS — E55.9 VITAMIN D INSUFFICIENCY: ICD-10-CM

## 2022-03-24 DIAGNOSIS — E78.00 PURE HYPERCHOLESTEROLEMIA: ICD-10-CM

## 2022-03-24 DIAGNOSIS — N02.9 THIN BASEMENT MEMBRANE NEPHROPATHY: ICD-10-CM

## 2022-03-24 LAB
25(OH)D3 SERPL-MCNC: 25 NG/ML (ref 30–100)
ALBUMIN SERPL BCP-MCNC: 4.3 G/DL (ref 3.2–4.9)
ALBUMIN/GLOB SERPL: 1.8 G/DL
ALP SERPL-CCNC: 94 U/L (ref 30–99)
ALT SERPL-CCNC: 10 U/L (ref 2–50)
ANION GAP SERPL CALC-SCNC: 13 MMOL/L (ref 7–16)
ANISOCYTOSIS BLD QL SMEAR: ABNORMAL
AST SERPL-CCNC: 19 U/L (ref 12–45)
BASOPHILS # BLD AUTO: 0.5 % (ref 0–1.8)
BASOPHILS # BLD: 0.03 K/UL (ref 0–0.12)
BILIRUB SERPL-MCNC: 0.5 MG/DL (ref 0.1–1.5)
BUN SERPL-MCNC: 11 MG/DL (ref 8–22)
CALCIUM SERPL-MCNC: 8.8 MG/DL (ref 8.5–10.5)
CHLORIDE SERPL-SCNC: 107 MMOL/L (ref 96–112)
CHOLEST SERPL-MCNC: 153 MG/DL (ref 100–199)
CO2 SERPL-SCNC: 23 MMOL/L (ref 20–33)
COMMENT 1642: NORMAL
CREAT SERPL-MCNC: 0.8 MG/DL (ref 0.5–1.4)
CREAT UR-MCNC: 37.73 MG/DL
EOSINOPHIL # BLD AUTO: 0.1 K/UL (ref 0–0.51)
EOSINOPHIL NFR BLD: 1.6 % (ref 0–6.9)
ERYTHROCYTE [DISTWIDTH] IN BLOOD BY AUTOMATED COUNT: 41.6 FL (ref 35.9–50)
FASTING STATUS PATIENT QL REPORTED: NORMAL
FERRITIN SERPL-MCNC: 3.9 NG/ML (ref 10–291)
GFR SERPLBLD CREATININE-BSD FMLA CKD-EPI: 97 ML/MIN/1.73 M 2
GLOBULIN SER CALC-MCNC: 2.4 G/DL (ref 1.9–3.5)
GLUCOSE SERPL-MCNC: 89 MG/DL (ref 65–99)
HCT VFR BLD AUTO: 37.3 % (ref 37–47)
HDLC SERPL-MCNC: 49 MG/DL
HGB BLD-MCNC: 10.7 G/DL (ref 12–16)
HYPOCHROMIA BLD QL SMEAR: ABNORMAL
IMM GRANULOCYTES # BLD AUTO: 0.01 K/UL (ref 0–0.11)
IMM GRANULOCYTES NFR BLD AUTO: 0.2 % (ref 0–0.9)
IRON SATN MFR SERPL: 4 % (ref 15–55)
IRON SERPL-MCNC: 18 UG/DL (ref 40–170)
LDLC SERPL CALC-MCNC: 88 MG/DL
LYMPHOCYTES # BLD AUTO: 1.89 K/UL (ref 1–4.8)
LYMPHOCYTES NFR BLD: 30.6 % (ref 22–41)
MCH RBC QN AUTO: 21.6 PG (ref 27–33)
MCHC RBC AUTO-ENTMCNC: 28.7 G/DL (ref 33.6–35)
MCV RBC AUTO: 75.2 FL (ref 81.4–97.8)
MICROALBUMIN UR-MCNC: <1.2 MG/DL
MICROALBUMIN/CREAT UR: NORMAL MG/G (ref 0–30)
MICROCYTES BLD QL SMEAR: ABNORMAL
MONOCYTES # BLD AUTO: 0.55 K/UL (ref 0–0.85)
MONOCYTES NFR BLD AUTO: 8.9 % (ref 0–13.4)
MORPHOLOGY BLD-IMP: NORMAL
NEUTROPHILS # BLD AUTO: 3.6 K/UL (ref 2–7.15)
NEUTROPHILS NFR BLD: 58.2 % (ref 44–72)
NRBC # BLD AUTO: 0 K/UL
NRBC BLD-RTO: 0 /100 WBC
OVALOCYTES BLD QL SMEAR: NORMAL
PLATELET # BLD AUTO: 276 K/UL (ref 164–446)
PLATELET BLD QL SMEAR: NORMAL
PMV BLD AUTO: 10.8 FL (ref 9–12.9)
POIKILOCYTOSIS BLD QL SMEAR: NORMAL
POTASSIUM SERPL-SCNC: 4.5 MMOL/L (ref 3.6–5.5)
PROT SERPL-MCNC: 6.7 G/DL (ref 6–8.2)
RBC # BLD AUTO: 4.96 M/UL (ref 4.2–5.4)
RBC BLD AUTO: PRESENT
SODIUM SERPL-SCNC: 143 MMOL/L (ref 135–145)
T4 FREE SERPL-MCNC: 1.34 NG/DL (ref 0.93–1.7)
TIBC SERPL-MCNC: 424 UG/DL (ref 250–450)
TRIGL SERPL-MCNC: 81 MG/DL (ref 0–149)
TSH SERPL DL<=0.005 MIU/L-ACNC: 0.21 UIU/ML (ref 0.38–5.33)
UIBC SERPL-MCNC: 406 UG/DL (ref 110–370)
WBC # BLD AUTO: 6.2 K/UL (ref 4.8–10.8)

## 2022-03-24 PROCEDURE — 85025 COMPLETE CBC W/AUTO DIFF WBC: CPT

## 2022-03-24 PROCEDURE — 82043 UR ALBUMIN QUANTITATIVE: CPT

## 2022-03-24 PROCEDURE — 80053 COMPREHEN METABOLIC PANEL: CPT

## 2022-03-24 PROCEDURE — 36415 COLL VENOUS BLD VENIPUNCTURE: CPT

## 2022-03-24 PROCEDURE — 83540 ASSAY OF IRON: CPT

## 2022-03-24 PROCEDURE — 82728 ASSAY OF FERRITIN: CPT

## 2022-03-24 PROCEDURE — 82570 ASSAY OF URINE CREATININE: CPT

## 2022-03-24 PROCEDURE — 83550 IRON BINDING TEST: CPT

## 2022-03-24 PROCEDURE — 82306 VITAMIN D 25 HYDROXY: CPT

## 2022-03-24 PROCEDURE — 84443 ASSAY THYROID STIM HORMONE: CPT

## 2022-03-24 PROCEDURE — 80061 LIPID PANEL: CPT

## 2022-03-24 PROCEDURE — 84439 ASSAY OF FREE THYROXINE: CPT

## 2022-03-25 DIAGNOSIS — Z13.79 GENETIC SCREENING: ICD-10-CM

## 2022-03-25 DIAGNOSIS — D50.8 OTHER IRON DEFICIENCY ANEMIA: ICD-10-CM

## 2022-03-25 DIAGNOSIS — E89.0 POSTOPERATIVE HYPOTHYROIDISM: ICD-10-CM

## 2022-03-25 DIAGNOSIS — E55.9 VITAMIN D INSUFFICIENCY: ICD-10-CM

## 2022-03-25 DIAGNOSIS — E78.00 PURE HYPERCHOLESTEROLEMIA: ICD-10-CM

## 2022-04-09 DIAGNOSIS — E89.0 POSTOPERATIVE HYPOTHYROIDISM: ICD-10-CM

## 2022-04-09 DIAGNOSIS — F33.2 SEVERE EPISODE OF RECURRENT MAJOR DEPRESSIVE DISORDER, WITHOUT PSYCHOTIC FEATURES (HCC): ICD-10-CM

## 2022-04-12 RX ORDER — BUPROPION HYDROCHLORIDE 75 MG/1
TABLET ORAL
Qty: 60 TABLET | Refills: 1 | Status: SHIPPED | OUTPATIENT
Start: 2022-04-12 | End: 2022-05-10 | Stop reason: SDUPTHER

## 2022-04-12 RX ORDER — LEVOTHYROXINE SODIUM 0.2 MG/1
TABLET ORAL
Qty: 30 TABLET | Refills: 1 | Status: SHIPPED | OUTPATIENT
Start: 2022-04-12 | End: 2022-05-10 | Stop reason: SDUPTHER

## 2022-04-20 DIAGNOSIS — D47.09 MAST CELL DISEASE: ICD-10-CM

## 2022-04-20 DIAGNOSIS — E89.0 POSTOPERATIVE HYPOTHYROIDISM: ICD-10-CM

## 2022-04-20 DIAGNOSIS — L50.3 DERMATOGRAPHIC URTICARIA: ICD-10-CM

## 2022-04-20 DIAGNOSIS — J45.20 MILD INTERMITTENT ASTHMA WITHOUT COMPLICATION: ICD-10-CM

## 2022-04-20 DIAGNOSIS — F33.2 SEVERE EPISODE OF RECURRENT MAJOR DEPRESSIVE DISORDER, WITHOUT PSYCHOTIC FEATURES (HCC): ICD-10-CM

## 2022-04-20 RX ORDER — LEVOTHYROXINE SODIUM 0.2 MG/1
200 TABLET ORAL
Qty: 90 TABLET | OUTPATIENT
Start: 2022-04-20

## 2022-04-20 RX ORDER — BUPROPION HYDROCHLORIDE 75 MG/1
75 TABLET ORAL 2 TIMES DAILY
Qty: 180 TABLET | OUTPATIENT
Start: 2022-04-20

## 2022-04-20 RX ORDER — HYDROXYZINE HYDROCHLORIDE 10 MG/1
10 TABLET, FILM COATED ORAL 3 TIMES DAILY PRN
Qty: 270 TABLET | OUTPATIENT
Start: 2022-04-20

## 2022-04-20 RX ORDER — MONTELUKAST SODIUM 10 MG/1
10 TABLET ORAL
Qty: 90 TABLET | OUTPATIENT
Start: 2022-04-20

## 2022-04-22 DIAGNOSIS — L50.3 DERMATOGRAPHIC URTICARIA: ICD-10-CM

## 2022-04-22 DIAGNOSIS — D47.09 MAST CELL DISEASE: ICD-10-CM

## 2022-05-07 DIAGNOSIS — F33.2 SEVERE EPISODE OF RECURRENT MAJOR DEPRESSIVE DISORDER, WITHOUT PSYCHOTIC FEATURES (HCC): ICD-10-CM

## 2022-05-07 DIAGNOSIS — E89.0 POSTOPERATIVE HYPOTHYROIDISM: ICD-10-CM

## 2022-05-10 RX ORDER — LEVOTHYROXINE SODIUM 0.2 MG/1
TABLET ORAL
Qty: 30 TABLET | Refills: 1 | Status: SHIPPED | OUTPATIENT
Start: 2022-05-10 | End: 2022-05-31

## 2022-05-10 RX ORDER — BUPROPION HYDROCHLORIDE 75 MG/1
TABLET ORAL
Qty: 60 TABLET | Refills: 1 | Status: SHIPPED | OUTPATIENT
Start: 2022-05-10 | End: 2022-05-18

## 2022-05-18 ENCOUNTER — OFFICE VISIT (OUTPATIENT)
Dept: MEDICAL GROUP | Facility: MEDICAL CENTER | Age: 37
End: 2022-05-18
Payer: MEDICARE

## 2022-05-18 VITALS
OXYGEN SATURATION: 98 % | DIASTOLIC BLOOD PRESSURE: 66 MMHG | TEMPERATURE: 98.9 F | BODY MASS INDEX: 33.7 KG/M2 | HEIGHT: 64 IN | SYSTOLIC BLOOD PRESSURE: 120 MMHG | WEIGHT: 197.4 LBS | HEART RATE: 78 BPM

## 2022-05-18 DIAGNOSIS — F33.2 SEVERE EPISODE OF RECURRENT MAJOR DEPRESSIVE DISORDER, WITHOUT PSYCHOTIC FEATURES (HCC): ICD-10-CM

## 2022-05-18 DIAGNOSIS — D47.09 MAST CELL DISEASE: ICD-10-CM

## 2022-05-18 DIAGNOSIS — Z91.09 ENVIRONMENTAL ALLERGIES: ICD-10-CM

## 2022-05-18 DIAGNOSIS — L50.3 DERMATOGRAPHIC URTICARIA: ICD-10-CM

## 2022-05-18 DIAGNOSIS — K52.9 CHRONIC DIARRHEA: ICD-10-CM

## 2022-05-18 DIAGNOSIS — E89.0 POSTOPERATIVE HYPOTHYROIDISM: ICD-10-CM

## 2022-05-18 DIAGNOSIS — E16.4 ELEVATED GASTRIN LEVEL: ICD-10-CM

## 2022-05-18 PROCEDURE — 99214 OFFICE O/P EST MOD 30 MIN: CPT | Performed by: INTERNAL MEDICINE

## 2022-05-18 RX ORDER — FLUOXETINE 10 MG/1
20 CAPSULE ORAL DAILY
Qty: 180 CAPSULE | Refills: 0 | Status: SHIPPED | OUTPATIENT
Start: 2022-05-18 | End: 2022-06-15

## 2022-05-18 ASSESSMENT — FIBROSIS 4 INDEX: FIB4 SCORE: 0.83

## 2022-05-18 NOTE — PROGRESS NOTES
Established Patient    Annamaria presents today with the following:    CC: Follow-up for chronic medical problems    HPI:   Annamaria is a 37 y.o. female who came in for above.    She resumed Wellbutrin. however, she did not tolerate this time because of back pain instantly associated with resumption of medication and went away after stopping the medication.  Her mood is slightly better with warmer season.  She would like to try another medication that does not have effect on weight.    Since she started levothyroxine again, and her energy and hair loss have improved.    She started vitamin D 30 mcg daily and iron supplement.  She did not tolerate iron supplement well due to GI side effects.    She continues to have hives, itching, flushing, intermittent diarrhea, all seem to be part of her usual mast cell disease symptoms.  This is previously diagnosed after being to [place] and [facility].  The lab results at that time are not available since it was done in 2012.    She did have some throat tightness and cough along with urticaria, unclear if it is anaphylaxis.  She did require EpiPen for bee sting. She had hives instantly with iodine contrast.  She has throat itchiness and cough with multiple food such as vingar, chocolate, strawberry.   She has environmental allergy and watery eyes with devin brush.   She has exercise induced asthma.    ROS:   As above    Patient Active Problem List    Diagnosis Date Noted   • Family history of arterial disease 03/18/2022   • Severe episode of recurrent major depressive disorder, without psychotic features (HCC) 01/22/2019   • Vitamin D insufficiency 10/01/2018   • Postoperative hypothyroidism 01/15/2016   • Mild intermittent asthma 09/28/2015   • Mast cell disease 11/19/2014   • Dermatographic urticaria 11/14/2013   • Migraine headache 11/14/2013   • Autoimmune disorder (HCC) 05/17/2012   • Hashimoto's thyroiditis 05/17/2012   • Thin basement membrane nephropathy 05/17/2012  "      Current Outpatient Medications   Medication Sig Dispense Refill   • FLUoxetine (PROZAC) 10 MG Cap Take 2 Capsules by mouth every day. Start with 10 mg daily x 1 week, then increase 20 mg daily 180 Capsule 0   • levothyroxine (SYNTHROID) 200 MCG Tab TAKE 1 TABLET BY MOUTH EVERY DAY IN THE MORNING ON AN EMPTY STOMACH 30 Tablet 1   • Iron, Ferrous Gluconate, 256 (28 Fe) MG Tab Take 1 Tablet by mouth every day. 90 Tablet 1   • montelukast (SINGULAIR) 10 MG Tab Take 1 Tablet by mouth every day. 90 Tablet 3   • loratadine (CLARITIN) 10 MG Tab Take 1 Tablet by mouth every day. 90 Tablet 1   • cetirizine (ZYRTEC) 10 MG Tab Take 1 Tablet by mouth 2 times a day. 180 Tablet 3   • hydrOXYzine HCl (ATARAX) 10 MG Tab TAKE 1 TABLET BY MOUTH THREE TIMES A DAY AS NEEDED 270 Tablet 1   • albuterol 108 (90 Base) MCG/ACT Aero Soln inhalation aerosol INHALE 2 PUFFS BY MOUTH EVERY 6 HOURS AS NEEDED FOR SHORTNESS OF BREATH 8.5 Each 6   • famotidine (PEPCID) 20 MG Tab Take 1 Tab by mouth 2 times a day. 60 Tab 11   • acyclovir (ZOVIRAX) 5 % Ointment APPLY TO COLD SORE 4 TIMES DAILY 30 g 0   • therapeutic multivitamin-minerals (THERAGRAN-M) Tab Take 1 Tab by mouth every day.       No current facility-administered medications for this visit.         /66   Pulse 78   Temp 37.2 °C (98.9 °F) (Temporal)   Ht 1.626 m (5' 4\")   Wt 89.5 kg (197 lb 6.4 oz)   SpO2 98%   BMI 33.88 kg/m²     Physical Exam  General: Alert and oriented, No apparent distress.  Lungs: Clear to auscultation bilaterally without any wheezing, crepitations.  Cardiovascular: Regular rate and rhythm. No murmurs, rubs or gallops.  Extremities: No edema.       Assessment and Plan    1. Severe episode of recurrent major depressive disorder, without psychotic features (HCC)  Apart from Wellbutrin, she has not been on any other medication daily.  Discussed to start Prozac.  Explained possible side effects.  Follow-up in 2 months  - FLUoxetine (PROZAC) 10 MG Cap; Take " 2 Capsules by mouth every day. Start with 10 mg daily x 1 week, then increase 20 mg daily  Dispense: 180 Capsule; Refill: 0    2. Postoperative hypothyroidism  -Recheck TSH as ordered    3. Dermatographic urticaria  4. Mast cell disease  - Not controlled despite multiple doses of first generation and second-generation antihistamine.  We will repeat tryptase and 24-hour histamine urine.  If she does not qualify to be seen for mast cell disease, she can be seen for chronic urticaria and uncontrolled symptoms of environmental and food allergies.    5. Chronic diarrhea  Previously evaluated by Dr. Titus.  Celiac disease biopsy was negative.  - VITAMIN B12; Future    6. Environmental allergies    7. Elevated gastrin level  She has been evaluated for this to locate possible tumor, none were found previously.  She would like to recheck gastrin level.  It is possible that she was on antihistamine including Zantac in the past when gastrin level was done.  If it is significantly elevated, will refer back to GI.  - GASTRIN; Future  - Comp Metabolic Panel; Future        Return in about 2 months (around 7/18/2022).         Signed by: Deirdre Cano M.D.

## 2022-05-20 ENCOUNTER — HOSPITAL ENCOUNTER (OUTPATIENT)
Dept: LAB | Facility: MEDICAL CENTER | Age: 37
End: 2022-05-20
Attending: INTERNAL MEDICINE
Payer: MEDICARE

## 2022-05-20 DIAGNOSIS — E16.4 ELEVATED GASTRIN LEVEL: ICD-10-CM

## 2022-05-20 DIAGNOSIS — E55.9 VITAMIN D INSUFFICIENCY: ICD-10-CM

## 2022-05-20 DIAGNOSIS — D47.09 MAST CELL DISEASE: ICD-10-CM

## 2022-05-20 DIAGNOSIS — L50.3 DERMATOGRAPHIC URTICARIA: ICD-10-CM

## 2022-05-20 DIAGNOSIS — E89.0 POSTOPERATIVE HYPOTHYROIDISM: ICD-10-CM

## 2022-05-20 DIAGNOSIS — K52.9 CHRONIC DIARRHEA: ICD-10-CM

## 2022-05-20 DIAGNOSIS — D50.8 OTHER IRON DEFICIENCY ANEMIA: ICD-10-CM

## 2022-05-20 LAB
ALBUMIN SERPL BCP-MCNC: 4.8 G/DL (ref 3.2–4.9)
ALBUMIN/GLOB SERPL: 1.5 G/DL
ALP SERPL-CCNC: 123 U/L (ref 30–99)
ALT SERPL-CCNC: 19 U/L (ref 2–50)
ANION GAP SERPL CALC-SCNC: 13 MMOL/L (ref 7–16)
ANISOCYTOSIS BLD QL SMEAR: ABNORMAL
AST SERPL-CCNC: 20 U/L (ref 12–45)
BASOPHILS # BLD AUTO: 0.2 % (ref 0–1.8)
BASOPHILS # BLD: 0.02 K/UL (ref 0–0.12)
BILIRUB SERPL-MCNC: 0.5 MG/DL (ref 0.1–1.5)
BUN SERPL-MCNC: 13 MG/DL (ref 8–22)
CALCIUM SERPL-MCNC: 9.3 MG/DL (ref 8.5–10.5)
CHLORIDE SERPL-SCNC: 104 MMOL/L (ref 96–112)
CO2 SERPL-SCNC: 22 MMOL/L (ref 20–33)
COMMENT 1642: NORMAL
CREAT SERPL-MCNC: 0.62 MG/DL (ref 0.5–1.4)
EOSINOPHIL # BLD AUTO: 0.04 K/UL (ref 0–0.51)
EOSINOPHIL NFR BLD: 0.5 % (ref 0–6.9)
ERYTHROCYTE [DISTWIDTH] IN BLOOD BY AUTOMATED COUNT: 41 FL (ref 35.9–50)
FERRITIN SERPL-MCNC: 10.9 NG/ML (ref 10–291)
GFR SERPLBLD CREATININE-BSD FMLA CKD-EPI: 117 ML/MIN/1.73 M 2
GLOBULIN SER CALC-MCNC: 3.1 G/DL (ref 1.9–3.5)
GLUCOSE SERPL-MCNC: 93 MG/DL (ref 65–99)
HCT VFR BLD AUTO: 39.5 % (ref 37–47)
HGB BLD-MCNC: 11.4 G/DL (ref 12–16)
IMM GRANULOCYTES # BLD AUTO: 0.03 K/UL (ref 0–0.11)
IMM GRANULOCYTES NFR BLD AUTO: 0.4 % (ref 0–0.9)
IRON SATN MFR SERPL: 5 % (ref 15–55)
IRON SERPL-MCNC: 21 UG/DL (ref 40–170)
LYMPHOCYTES # BLD AUTO: 1.44 K/UL (ref 1–4.8)
LYMPHOCYTES NFR BLD: 17.3 % (ref 22–41)
MCH RBC QN AUTO: 21.4 PG (ref 27–33)
MCHC RBC AUTO-ENTMCNC: 28.9 G/DL (ref 33.6–35)
MCV RBC AUTO: 74.1 FL (ref 81.4–97.8)
MICROCYTES BLD QL SMEAR: ABNORMAL
MONOCYTES # BLD AUTO: 0.76 K/UL (ref 0–0.85)
MONOCYTES NFR BLD AUTO: 9.1 % (ref 0–13.4)
MORPHOLOGY BLD-IMP: NORMAL
NEUTROPHILS # BLD AUTO: 6.03 K/UL (ref 2–7.15)
NEUTROPHILS NFR BLD: 72.5 % (ref 44–72)
NRBC # BLD AUTO: 0 K/UL
NRBC BLD-RTO: 0 /100 WBC
OVALOCYTES BLD QL SMEAR: NORMAL
PLATELET # BLD AUTO: 304 K/UL (ref 164–446)
PLATELET BLD QL SMEAR: NORMAL
PMV BLD AUTO: 10.2 FL (ref 9–12.9)
POIKILOCYTOSIS BLD QL SMEAR: NORMAL
POTASSIUM SERPL-SCNC: 3.9 MMOL/L (ref 3.6–5.5)
PROT SERPL-MCNC: 7.9 G/DL (ref 6–8.2)
RBC # BLD AUTO: 5.33 M/UL (ref 4.2–5.4)
RBC BLD AUTO: PRESENT
SODIUM SERPL-SCNC: 139 MMOL/L (ref 135–145)
T4 FREE SERPL-MCNC: 1.9 NG/DL (ref 0.93–1.7)
TIBC SERPL-MCNC: 464 UG/DL (ref 250–450)
TSH SERPL DL<=0.005 MIU/L-ACNC: 0.01 UIU/ML (ref 0.38–5.33)
UIBC SERPL-MCNC: 443 UG/DL (ref 110–370)
VIT B12 SERPL-MCNC: 410 PG/ML (ref 211–911)
WBC # BLD AUTO: 8.3 K/UL (ref 4.8–10.8)

## 2022-05-20 PROCEDURE — 82306 VITAMIN D 25 HYDROXY: CPT

## 2022-05-20 PROCEDURE — 82941 ASSAY OF GASTRIN: CPT

## 2022-05-20 PROCEDURE — 82728 ASSAY OF FERRITIN: CPT

## 2022-05-20 PROCEDURE — 83540 ASSAY OF IRON: CPT

## 2022-05-20 PROCEDURE — 80053 COMPREHEN METABOLIC PANEL: CPT

## 2022-05-20 PROCEDURE — 82607 VITAMIN B-12: CPT

## 2022-05-20 PROCEDURE — 85025 COMPLETE CBC W/AUTO DIFF WBC: CPT

## 2022-05-20 PROCEDURE — 83520 IMMUNOASSAY QUANT NOS NONAB: CPT

## 2022-05-20 PROCEDURE — 84439 ASSAY OF FREE THYROXINE: CPT

## 2022-05-20 PROCEDURE — 84443 ASSAY THYROID STIM HORMONE: CPT

## 2022-05-20 PROCEDURE — 83550 IRON BINDING TEST: CPT

## 2022-05-20 PROCEDURE — 36415 COLL VENOUS BLD VENIPUNCTURE: CPT

## 2022-05-22 LAB — 25(OH)D3 SERPL-MCNC: 38 NG/ML (ref 30–80)

## 2022-05-23 LAB
GASTRIN SERPL-MCNC: 309 PG/ML (ref 0–100)
TRYPTASE SERPL-MCNC: 4.1 UG/L

## 2022-05-24 ENCOUNTER — HOSPITAL ENCOUNTER (OUTPATIENT)
Facility: MEDICAL CENTER | Age: 37
End: 2022-05-24
Attending: INTERNAL MEDICINE
Payer: MEDICARE

## 2022-05-24 PROCEDURE — 83088 ASSAY OF HISTAMINE: CPT

## 2022-05-31 DIAGNOSIS — E89.0 POSTOPERATIVE HYPOTHYROIDISM: ICD-10-CM

## 2022-05-31 DIAGNOSIS — L50.8 CHRONIC URTICARIA: ICD-10-CM

## 2022-05-31 DIAGNOSIS — E16.4 ELEVATED GASTRIN LEVEL: ICD-10-CM

## 2022-05-31 DIAGNOSIS — L50.3 DERMATOGRAPHIC URTICARIA: ICD-10-CM

## 2022-05-31 RX ORDER — LEVOTHYROXINE SODIUM 175 UG/1
175 TABLET ORAL
Qty: 90 TABLET | Refills: 0 | Status: SHIPPED | OUTPATIENT
Start: 2022-05-31 | End: 2022-08-12

## 2022-06-01 LAB
COLLECT DURATION TIME SPEC: 24 HRS
CREAT 24H UR-MCNC: 135 MG/DL
HISTAMINE 24H UR-MRATE: >121 UG/D (ref 0–60)
HISTAMINE UR-SCNC: >908 NMOL/L
TOTAL VOLUME 1105: 1200 ML

## 2022-06-06 ENCOUNTER — TELEPHONE (OUTPATIENT)
Dept: MEDICAL GROUP | Facility: MEDICAL CENTER | Age: 37
End: 2022-06-06
Payer: MEDICARE

## 2022-06-06 NOTE — TELEPHONE ENCOUNTER
----- Message from Deirdre Cano M.D. sent at 6/3/2022  3:44 PM PDT -----  Please fax this result, tryptase and my last note to [facility] (Fax [phone], Attn: Luana Underwood)

## 2022-06-15 ENCOUNTER — RESEARCH ENCOUNTER (OUTPATIENT)
Dept: MEDICAL GROUP | Facility: PHYSICIAN GROUP | Age: 37
End: 2022-06-15
Payer: MEDICARE

## 2022-06-15 ENCOUNTER — PRE-ADMISSION TESTING (OUTPATIENT)
Dept: ADMISSIONS | Facility: MEDICAL CENTER | Age: 37
End: 2022-06-15
Attending: ORTHOPAEDIC SURGERY
Payer: MEDICARE

## 2022-06-15 DIAGNOSIS — Z00.6 RESEARCH STUDY PATIENT: ICD-10-CM

## 2022-06-15 NOTE — PREPROCEDURE INSTRUCTIONS
Pre admit apt: Pt. Instructed to continue regularly prescribed medications through day before surgery.  Instructed to take the following medications, the day of surgery, with a sip of water per anesthesia protocol:albuterol inh, zyrtec, pepcid hydroxyzine, levothyroxine, montelukast    Previous issues with anesthesia-PONV  METS-4

## 2022-06-16 ENCOUNTER — APPOINTMENT (OUTPATIENT)
Dept: ADMISSIONS | Facility: MEDICAL CENTER | Age: 37
End: 2022-06-16
Attending: ORTHOPAEDIC SURGERY
Payer: MEDICARE

## 2022-06-16 DIAGNOSIS — Z01.812 PRE-OPERATIVE LABORATORY EXAMINATION: ICD-10-CM

## 2022-06-17 ENCOUNTER — PRE-ADMISSION TESTING (OUTPATIENT)
Dept: ADMISSIONS | Facility: MEDICAL CENTER | Age: 37
End: 2022-06-17
Attending: ORTHOPAEDIC SURGERY
Payer: MEDICARE

## 2022-06-17 DIAGNOSIS — Z01.812 PRE-OPERATIVE LABORATORY EXAMINATION: ICD-10-CM

## 2022-06-17 LAB
ALBUMIN SERPL BCP-MCNC: 4.3 G/DL (ref 3.2–4.9)
ALBUMIN/GLOB SERPL: 1.5 G/DL
ALP SERPL-CCNC: 94 U/L (ref 30–99)
ALT SERPL-CCNC: 24 U/L (ref 2–50)
ANION GAP SERPL CALC-SCNC: 9 MMOL/L (ref 7–16)
AST SERPL-CCNC: 18 U/L (ref 12–45)
BILIRUB SERPL-MCNC: 0.4 MG/DL (ref 0.1–1.5)
BUN SERPL-MCNC: 12 MG/DL (ref 8–22)
CALCIUM SERPL-MCNC: 8.6 MG/DL (ref 8.4–10.2)
CHLORIDE SERPL-SCNC: 106 MMOL/L (ref 96–112)
CO2 SERPL-SCNC: 22 MMOL/L (ref 20–33)
CREAT SERPL-MCNC: 0.7 MG/DL (ref 0.5–1.4)
ERYTHROCYTE [DISTWIDTH] IN BLOOD BY AUTOMATED COUNT: 39.1 FL (ref 35.9–50)
GFR SERPLBLD CREATININE-BSD FMLA CKD-EPI: 113 ML/MIN/1.73 M 2
GLOBULIN SER CALC-MCNC: 2.9 G/DL (ref 1.9–3.5)
GLUCOSE SERPL-MCNC: 104 MG/DL (ref 65–99)
HCT VFR BLD AUTO: 36.9 % (ref 37–47)
HGB BLD-MCNC: 10.6 G/DL (ref 12–16)
MCH RBC QN AUTO: 21.1 PG (ref 27–33)
MCHC RBC AUTO-ENTMCNC: 28.7 G/DL (ref 33.6–35)
MCV RBC AUTO: 73.4 FL (ref 81.4–97.8)
PLATELET # BLD AUTO: 291 K/UL (ref 164–446)
PMV BLD AUTO: 10.4 FL (ref 9–12.9)
POTASSIUM SERPL-SCNC: 4 MMOL/L (ref 3.6–5.5)
PROT SERPL-MCNC: 7.2 G/DL (ref 6–8.2)
RBC # BLD AUTO: 5.03 M/UL (ref 4.2–5.4)
SODIUM SERPL-SCNC: 137 MMOL/L (ref 135–145)
WBC # BLD AUTO: 5.7 K/UL (ref 4.8–10.8)

## 2022-06-17 PROCEDURE — 80053 COMPREHEN METABOLIC PANEL: CPT

## 2022-06-17 PROCEDURE — 85027 COMPLETE CBC AUTOMATED: CPT

## 2022-06-17 PROCEDURE — 36415 COLL VENOUS BLD VENIPUNCTURE: CPT

## 2022-06-24 ENCOUNTER — ANESTHESIA EVENT (OUTPATIENT)
Dept: SURGERY | Facility: MEDICAL CENTER | Age: 37
End: 2022-06-24
Payer: MEDICARE

## 2022-06-24 ENCOUNTER — HOSPITAL ENCOUNTER (OUTPATIENT)
Facility: MEDICAL CENTER | Age: 37
End: 2022-06-24
Attending: ORTHOPAEDIC SURGERY | Admitting: ORTHOPAEDIC SURGERY
Payer: MEDICARE

## 2022-06-24 ENCOUNTER — APPOINTMENT (OUTPATIENT)
Dept: RADIOLOGY | Facility: MEDICAL CENTER | Age: 37
End: 2022-06-24
Attending: ORTHOPAEDIC SURGERY
Payer: MEDICARE

## 2022-06-24 ENCOUNTER — ANESTHESIA (OUTPATIENT)
Dept: SURGERY | Facility: MEDICAL CENTER | Age: 37
End: 2022-06-24
Payer: MEDICARE

## 2022-06-24 VITALS
HEART RATE: 77 BPM | HEIGHT: 64 IN | RESPIRATION RATE: 16 BRPM | WEIGHT: 193.12 LBS | OXYGEN SATURATION: 97 % | BODY MASS INDEX: 32.97 KG/M2 | SYSTOLIC BLOOD PRESSURE: 131 MMHG | DIASTOLIC BLOOD PRESSURE: 73 MMHG | TEMPERATURE: 97.5 F

## 2022-06-24 LAB
HCG UR QL: NEGATIVE
PATHOLOGY CONSULT NOTE: NORMAL

## 2022-06-24 PROCEDURE — A9270 NON-COVERED ITEM OR SERVICE: HCPCS | Performed by: ANESTHESIOLOGY

## 2022-06-24 PROCEDURE — 700105 HCHG RX REV CODE 258: Performed by: ANESTHESIOLOGY

## 2022-06-24 PROCEDURE — 700105 HCHG RX REV CODE 258: Performed by: ORTHOPAEDIC SURGERY

## 2022-06-24 PROCEDURE — 81025 URINE PREGNANCY TEST: CPT

## 2022-06-24 PROCEDURE — 160036 HCHG PACU - EA ADDL 30 MINS PHASE I: Performed by: ORTHOPAEDIC SURGERY

## 2022-06-24 PROCEDURE — 700102 HCHG RX REV CODE 250 W/ 637 OVERRIDE(OP): Performed by: ANESTHESIOLOGY

## 2022-06-24 PROCEDURE — 160009 HCHG ANES TIME/MIN: Performed by: ORTHOPAEDIC SURGERY

## 2022-06-24 PROCEDURE — 160025 RECOVERY II MINUTES (STATS): Performed by: ORTHOPAEDIC SURGERY

## 2022-06-24 PROCEDURE — 160038 HCHG SURGERY MINUTES - EA ADDL 1 MIN LEVEL 2: Performed by: ORTHOPAEDIC SURGERY

## 2022-06-24 PROCEDURE — 160027 HCHG SURGERY MINUTES - 1ST 30 MINS LEVEL 2: Performed by: ORTHOPAEDIC SURGERY

## 2022-06-24 PROCEDURE — 160035 HCHG PACU - 1ST 60 MINS PHASE I: Performed by: ORTHOPAEDIC SURGERY

## 2022-06-24 PROCEDURE — 700101 HCHG RX REV CODE 250: Performed by: ORTHOPAEDIC SURGERY

## 2022-06-24 PROCEDURE — 700111 HCHG RX REV CODE 636 W/ 250 OVERRIDE (IP): Performed by: ANESTHESIOLOGY

## 2022-06-24 PROCEDURE — 88305 TISSUE EXAM BY PATHOLOGIST: CPT

## 2022-06-24 PROCEDURE — 160046 HCHG PACU - 1ST 60 MINS PHASE II: Performed by: ORTHOPAEDIC SURGERY

## 2022-06-24 PROCEDURE — 160002 HCHG RECOVERY MINUTES (STAT): Performed by: ORTHOPAEDIC SURGERY

## 2022-06-24 PROCEDURE — 700101 HCHG RX REV CODE 250: Performed by: ANESTHESIOLOGY

## 2022-06-24 PROCEDURE — 01480 ANES OPEN PX LOWER L/A/F NOS: CPT | Performed by: ANESTHESIOLOGY

## 2022-06-24 PROCEDURE — 88311 DECALCIFY TISSUE: CPT

## 2022-06-24 PROCEDURE — 160048 HCHG OR STATISTICAL LEVEL 1-5: Performed by: ORTHOPAEDIC SURGERY

## 2022-06-24 PROCEDURE — 73610 X-RAY EXAM OF ANKLE: CPT | Mod: RT

## 2022-06-24 RX ORDER — DEXAMETHASONE SODIUM PHOSPHATE 4 MG/ML
INJECTION, SOLUTION INTRA-ARTICULAR; INTRALESIONAL; INTRAMUSCULAR; INTRAVENOUS; SOFT TISSUE PRN
Status: DISCONTINUED | OUTPATIENT
Start: 2022-06-24 | End: 2022-06-24 | Stop reason: SURG

## 2022-06-24 RX ORDER — ACETAMINOPHEN 500 MG
1000 TABLET ORAL EVERY 6 HOURS PRN
Status: DISCONTINUED | OUTPATIENT
Start: 2022-06-24 | End: 2022-06-24 | Stop reason: HOSPADM

## 2022-06-24 RX ORDER — LIDOCAINE HYDROCHLORIDE 20 MG/ML
INJECTION, SOLUTION EPIDURAL; INFILTRATION; INTRACAUDAL; PERINEURAL PRN
Status: DISCONTINUED | OUTPATIENT
Start: 2022-06-24 | End: 2022-06-24 | Stop reason: SURG

## 2022-06-24 RX ORDER — ONDANSETRON 2 MG/ML
4 INJECTION INTRAMUSCULAR; INTRAVENOUS
Status: COMPLETED | OUTPATIENT
Start: 2022-06-24 | End: 2022-06-24

## 2022-06-24 RX ORDER — MEPERIDINE HYDROCHLORIDE 25 MG/ML
12.5 INJECTION INTRAMUSCULAR; INTRAVENOUS; SUBCUTANEOUS
Status: DISCONTINUED | OUTPATIENT
Start: 2022-06-24 | End: 2022-06-24 | Stop reason: HOSPADM

## 2022-06-24 RX ORDER — ALBUTEROL SULFATE 2.5 MG/3ML
2.5 SOLUTION RESPIRATORY (INHALATION)
Status: DISCONTINUED | OUTPATIENT
Start: 2022-06-24 | End: 2022-06-24 | Stop reason: HOSPADM

## 2022-06-24 RX ORDER — ONDANSETRON 2 MG/ML
INJECTION INTRAMUSCULAR; INTRAVENOUS PRN
Status: DISCONTINUED | OUTPATIENT
Start: 2022-06-24 | End: 2022-06-24 | Stop reason: SURG

## 2022-06-24 RX ORDER — OXYCODONE HCL 5 MG/5 ML
5 SOLUTION, ORAL ORAL
Status: COMPLETED | OUTPATIENT
Start: 2022-06-24 | End: 2022-06-24

## 2022-06-24 RX ORDER — HYDRALAZINE HYDROCHLORIDE 20 MG/ML
5 INJECTION INTRAMUSCULAR; INTRAVENOUS
Status: DISCONTINUED | OUTPATIENT
Start: 2022-06-24 | End: 2022-06-24 | Stop reason: HOSPADM

## 2022-06-24 RX ORDER — KETOROLAC TROMETHAMINE 30 MG/ML
INJECTION, SOLUTION INTRAMUSCULAR; INTRAVENOUS PRN
Status: DISCONTINUED | OUTPATIENT
Start: 2022-06-24 | End: 2022-06-24 | Stop reason: SURG

## 2022-06-24 RX ORDER — HALOPERIDOL 5 MG/ML
1 INJECTION INTRAMUSCULAR
Status: DISCONTINUED | OUTPATIENT
Start: 2022-06-24 | End: 2022-06-24 | Stop reason: HOSPADM

## 2022-06-24 RX ORDER — SODIUM CHLORIDE, SODIUM LACTATE, POTASSIUM CHLORIDE, CALCIUM CHLORIDE 600; 310; 30; 20 MG/100ML; MG/100ML; MG/100ML; MG/100ML
INJECTION, SOLUTION INTRAVENOUS CONTINUOUS
Status: ACTIVE | OUTPATIENT
Start: 2022-06-24 | End: 2022-06-24

## 2022-06-24 RX ORDER — HYDROMORPHONE HYDROCHLORIDE 1 MG/ML
0.4 INJECTION, SOLUTION INTRAMUSCULAR; INTRAVENOUS; SUBCUTANEOUS
Status: DISCONTINUED | OUTPATIENT
Start: 2022-06-24 | End: 2022-06-24 | Stop reason: HOSPADM

## 2022-06-24 RX ORDER — HYDROMORPHONE HYDROCHLORIDE 1 MG/ML
0.2 INJECTION, SOLUTION INTRAMUSCULAR; INTRAVENOUS; SUBCUTANEOUS
Status: DISCONTINUED | OUTPATIENT
Start: 2022-06-24 | End: 2022-06-24 | Stop reason: HOSPADM

## 2022-06-24 RX ORDER — MIDAZOLAM HYDROCHLORIDE 1 MG/ML
INJECTION INTRAMUSCULAR; INTRAVENOUS PRN
Status: DISCONTINUED | OUTPATIENT
Start: 2022-06-24 | End: 2022-06-24 | Stop reason: SURG

## 2022-06-24 RX ORDER — DIPHENHYDRAMINE HYDROCHLORIDE 50 MG/ML
6.25 INJECTION INTRAMUSCULAR; INTRAVENOUS
Status: DISCONTINUED | OUTPATIENT
Start: 2022-06-24 | End: 2022-06-24 | Stop reason: HOSPADM

## 2022-06-24 RX ORDER — OXYCODONE HCL 5 MG/5 ML
10 SOLUTION, ORAL ORAL
Status: COMPLETED | OUTPATIENT
Start: 2022-06-24 | End: 2022-06-24

## 2022-06-24 RX ORDER — HYDROMORPHONE HYDROCHLORIDE 1 MG/ML
0.1 INJECTION, SOLUTION INTRAMUSCULAR; INTRAVENOUS; SUBCUTANEOUS
Status: DISCONTINUED | OUTPATIENT
Start: 2022-06-24 | End: 2022-06-24 | Stop reason: HOSPADM

## 2022-06-24 RX ORDER — DIPHENHYDRAMINE HYDROCHLORIDE 50 MG/ML
INJECTION INTRAMUSCULAR; INTRAVENOUS PRN
Status: DISCONTINUED | OUTPATIENT
Start: 2022-06-24 | End: 2022-06-24 | Stop reason: SURG

## 2022-06-24 RX ORDER — CEFAZOLIN SODIUM 1 G/3ML
INJECTION, POWDER, FOR SOLUTION INTRAMUSCULAR; INTRAVENOUS PRN
Status: DISCONTINUED | OUTPATIENT
Start: 2022-06-24 | End: 2022-06-24 | Stop reason: SURG

## 2022-06-24 RX ORDER — BUPIVACAINE HYDROCHLORIDE AND EPINEPHRINE 5; 5 MG/ML; UG/ML
INJECTION, SOLUTION EPIDURAL; INTRACAUDAL; PERINEURAL
Status: DISCONTINUED | OUTPATIENT
Start: 2022-06-24 | End: 2022-06-24 | Stop reason: HOSPADM

## 2022-06-24 RX ORDER — SODIUM CHLORIDE, SODIUM LACTATE, POTASSIUM CHLORIDE, CALCIUM CHLORIDE 600; 310; 30; 20 MG/100ML; MG/100ML; MG/100ML; MG/100ML
INJECTION, SOLUTION INTRAVENOUS CONTINUOUS
Status: DISCONTINUED | OUTPATIENT
Start: 2022-06-24 | End: 2022-06-24 | Stop reason: HOSPADM

## 2022-06-24 RX ADMIN — LIDOCAINE HYDROCHLORIDE 60 MG: 20 INJECTION, SOLUTION EPIDURAL; INFILTRATION; INTRACAUDAL at 15:17

## 2022-06-24 RX ADMIN — OXYCODONE HYDROCHLORIDE 5 MG: 5 SOLUTION ORAL at 17:15

## 2022-06-24 RX ADMIN — ONDANSETRON 4 MG: 2 INJECTION INTRAMUSCULAR; INTRAVENOUS at 17:15

## 2022-06-24 RX ADMIN — PROPOFOL 50 MG: 10 INJECTION, EMULSION INTRAVENOUS at 15:18

## 2022-06-24 RX ADMIN — SODIUM CHLORIDE, POTASSIUM CHLORIDE, SODIUM LACTATE AND CALCIUM CHLORIDE: 600; 310; 30; 20 INJECTION, SOLUTION INTRAVENOUS at 13:39

## 2022-06-24 RX ADMIN — ONDANSETRON 4 MG: 2 INJECTION INTRAMUSCULAR; INTRAVENOUS at 15:54

## 2022-06-24 RX ADMIN — MIDAZOLAM HYDROCHLORIDE 2 MG: 1 INJECTION, SOLUTION INTRAMUSCULAR; INTRAVENOUS at 15:14

## 2022-06-24 RX ADMIN — FENTANYL CITRATE 50 MCG: 50 INJECTION, SOLUTION INTRAMUSCULAR; INTRAVENOUS at 15:18

## 2022-06-24 RX ADMIN — KETOROLAC TROMETHAMINE 30 MG: 30 INJECTION, SOLUTION INTRAMUSCULAR at 16:04

## 2022-06-24 RX ADMIN — EPHEDRINE SULFATE 10 MG: 50 INJECTION, SOLUTION INTRAVENOUS at 15:37

## 2022-06-24 RX ADMIN — CEFAZOLIN 2 G: 330 INJECTION, POWDER, FOR SOLUTION INTRAMUSCULAR; INTRAVENOUS at 15:17

## 2022-06-24 RX ADMIN — FENTANYL CITRATE 50 MCG: 50 INJECTION, SOLUTION INTRAMUSCULAR; INTRAVENOUS at 15:57

## 2022-06-24 RX ADMIN — DIPHENHYDRAMINE HYDROCHLORIDE 25 MG: 50 INJECTION, SOLUTION INTRAMUSCULAR; INTRAVENOUS at 15:27

## 2022-06-24 RX ADMIN — SODIUM CHLORIDE, POTASSIUM CHLORIDE, SODIUM LACTATE AND CALCIUM CHLORIDE: 600; 310; 30; 20 INJECTION, SOLUTION INTRAVENOUS at 16:26

## 2022-06-24 RX ADMIN — FENTANYL CITRATE 100 MCG: 50 INJECTION, SOLUTION INTRAMUSCULAR; INTRAVENOUS at 15:17

## 2022-06-24 RX ADMIN — PROPOFOL 200 MG: 10 INJECTION, EMULSION INTRAVENOUS at 15:17

## 2022-06-24 RX ADMIN — DEXAMETHASONE SODIUM PHOSPHATE 8 MG: 4 INJECTION, SOLUTION INTRA-ARTICULAR; INTRALESIONAL; INTRAMUSCULAR; INTRAVENOUS; SOFT TISSUE at 15:23

## 2022-06-24 ASSESSMENT — PAIN DESCRIPTION - PAIN TYPE
TYPE: SURGICAL PAIN
TYPE: ACUTE PAIN
TYPE: SURGICAL PAIN

## 2022-06-24 ASSESSMENT — FIBROSIS 4 INDEX: FIB4 SCORE: 0.48

## 2022-06-24 NOTE — OP REPORT
DATE OF OPERATION: 6/24/2022     SURGEON: Vernon Gaspar M.D.    ANESTHESIOLOGIST: Anesthesiologist: Balaji Zambrano M.D.    ANESTHESIA: General with local administration    SURGICAL FIRST ASST: None    PREOPERATIVE DIAGNOSIS: Right fibular exostosis    POSTOPERATIVE DIAGNOSIS:   Right fibular exostosis  Right peroneal tendon tenosynovitis    PROCEDURE:   Right fibular exostectomy  Right peroneus brevis and longus debridement and tenosynovectomy    INDICATIONS: Patient is a 37-year-old female who had a previous ankle ORIF several decades ago when she was a teenager.  She had the hardware removed at a later date.  Since the time of the hardware removal she has had a bony prominence over the lateral aspect of the fibula.  Over time this seems to have worsened and is now causing her more discomfort and inability to wear certain types of shoes and significant pain when anything bumps up against the bony prominence.  She had attempted nonoperative management with shoewear modification and had persistent pain.  She was indicated for surgery to remove the significant bony prominence.  She understood the risk and benefits prior to proceeding    DETAILS OF PROCEDURE: The patient was met in the preoperative holding area where the right side was marked as the correct operative side.  Risk-benefit discussion was had and consent was signed.  She was brought to the operating room a timeout confirmed patient, laterality, and procedure.  The anesthesia was induced and an airway was established.  The thigh tourniquet was placed and the limb was prepped and draped in the normal sterile surgical fashion.  Antibiotics were administered.  The limb was exsanguinated and the tourniquet was inflated and the surgery was begun.     We made incision directly over her previous lateral incision.  We sharply dissected through the skin and then bluntly dissected down onto the bone and were immediately down on the fibular bony prominence.  We  dissected posteriorly to this and the peroneal tendons were immediately behind the bony prominence which jetted out posteriorly almost impinging on the peroneal tendons.  The peroneal tendons were noted to have dense synovitic type tissue so we dissected off the back of the fibula little bit more proximal and further examine the peroneal tendons.  The brevis and longus were found to be without tearing but there was some synovitic type tissue around both peroneus longus and brevis tendons that was debrided out with scissors down to healthy tendon and muscle belly.     We then turned our attention to the bony prominence.  This was prominent mostly posteriorly and impinging on the peroneal tendons so it was removed with a rongeur.  We then smoothed down some of the bony prominences on the lateral fibula with a power rasp.  We spent some time here rasping and then feeling and rasping and then feeling just to make sure that we had all bony prominences accounted for.  Once we were happy that all prominences have been removed and the fibula was nice and smooth we then irrigated the incision well, we repaired the peroneal sheath back to the posterior aspect of the fibula.  We then closed the wound with a combination of 3-0 Monocryl for the subcutaneous layer followed by staples for the skin.  A soft dressing was placed and the patient was placed into a boot and was awoken from anesthesia and brought to the postoperative care unit without complication.     COMPLICATIONS: None    POST OP PLAN: Patient will be weightbearing as tolerated in the boot.  Keep dressing clean and dry.  Ibuprofen and Tylenol for pain control.  Follow-up in 2 weeks for staple removal and can likely transition out of boot at that point.  Aspirin 81 mg twice daily for DVT prophylaxis  ____________________________________   Vernon Gaspar M.D.

## 2022-06-24 NOTE — ANESTHESIA PREPROCEDURE EVALUATION
Case: [id number] Date/Time: 06/24/22 1500    Procedure: REMOVAL RIGHT FIBULA EXOSTOSIS WITH REPAIRS AS INDICATED (Right )    Pre-op diagnosis: EXOSTOSIS    Location:  OR 06 / SURGERY [facility]    Surgeons: Vernon Gaspar M.D.          Relevant Problems   PULMONARY   (positive) Mild intermittent asthma      NEURO   (positive) Migraine headache      CARDIAC   (positive) Migraine headache         (positive) Thin basement membrane nephropathy      ENDO   (positive) Postoperative hypothyroidism      Other   (positive) Mast cell disease       Physical Exam    Airway   Mallampati: II  TM distance: >3 FB  Neck ROM: full       Cardiovascular - normal exam  Rhythm: regular  Rate: normal  (-) murmur     Dental - normal exam           Pulmonary - normal exam  Breath sounds clear to auscultation     Abdominal    Neurological - normal exam                 Anesthesia Plan    ASA 2       Plan - general       Airway plan will be LMA          Induction: intravenous    Postoperative Plan: Postoperative administration of opioids is intended.    Pertinent diagnostic labs and testing reviewed    Informed Consent:    Anesthetic plan and risks discussed with patient.

## 2022-06-24 NOTE — ANESTHESIA TIME REPORT
Anesthesia Start and Stop Event Times     Date Time Event    6/24/2022 1502 Ready for Procedure     1513 Anesthesia Start     1613 Anesthesia Stop        Responsible Staff  06/24/22    Name Role Begin End    Balaji Zambrano M.D. Anesth 1513 1613        Overtime Reason:  no overtime (within assigned shift)    Comments:

## 2022-06-24 NOTE — OR NURSING
Pt allergies and NPO status verified. Home medications reconciled. Belongings secured. Pt verbalizes understanding of pain scale, expected course of stay and plan of care. Surgical site verified with pt. IV access established. Triple AIM completed. All questions answered. Bed in low position. Call light within reach.

## 2022-06-24 NOTE — OR NURSING
REMOVAL RIGHT FIBULA EXOSTOSIS - Right  Vernon Gaspar M.D.  Balaji Zambrano M.D.  ---------------------------------------------------------  1611: To PACU from OR via gurney, respirations spontaneous and non-labored. Icepack applied over c/d/i right lower leg surgical dressings. Right foot boot in place. Toes right foot warm and pink.    PIV right AC flushed and saline locked.     1625: Pt resting comfortably, no s/sx distress.  VSS.     1640: OPA DC'd. Opens eyes spontaneously. VSS. Denies pain or nausea.     1655: Pt sitting up.  Denies pain or nausea.   respirations spontaneous and non-labored. No change in surgical site assessment.  Friend of patient called and updated.     1710: No changes.    1715: OXYCODONE and ZOFRAN given for pain and nausea. Pt is drinking juice.     1730: respirations spontaneous and non-laboredNo change in surgical site assessment.Meets criteria to transfer to Stage 2. Pt is in agreement and wants to go home.

## 2022-06-24 NOTE — DISCHARGE INSTRUCTIONS
POST OP PLAN:   Patient will be weightbearing as tolerated in the boot.    Keep dressing clean and dry.    Ibuprofen and Tylenol for pain control.    Follow-up in 2 weeks for staple removal and can likely transition out of boot at that point.    Aspirin 81 mg twice daily for DVT prophylaxis    ACTIVITY: Rest and take it easy for the first 24 hours.  A responsible adult is recommended to remain with you during that time.  It is normal to feel sleepy.  We encourage you to not do anything that requires balance, judgment or coordination.    MILD FLU-LIKE SYMPTOMS ARE NORMAL. YOU MAY EXPERIENCE GENERALIZED MUSCLE ACHES, THROAT IRRITATION, HEADACHE AND/OR SOME NAUSEA.    FOR 24 HOURS DO NOT:  Drive, operate machinery or run household appliances.  Drink beer or alcoholic beverages.   Make important decisions or sign legal documents.    SPECIAL INSTRUCTIONS:     DIET: To avoid nausea, slowly advance diet as tolerated, avoiding spicy or greasy foods for the first day.  Add more substantial food to your diet according to your physician's instructions. INCREASE FLUIDS AND FIBER TO AVOID CONSTIPATION.    SURGICAL DRESSING/BATHING: Keep dressing clean and dry (cover for showers) as instructed by .  Ice and elevate: rotating cold pack 20 minutes on - 20 minutes off with barrier between skin and cold pack.      FOLLOW-UP APPOINTMENT:  A follow-up appointment should be arranged with ; call [phone] to schedule.    You should CALL YOUR PHYSICIAN if you develop:  Fever greater than 101 degrees F.  Pain not relieved by medication, or persistent nausea or vomiting.  Excessive bleeding (blood soaking through dressing) or unexpected drainage from the wound.  Extreme redness or swelling around the incision site, drainage of pus or foul smelling drainage.  Inability to urinate or empty your bladder within 8 hours.  Problems with breathing or chest pain.    You should call [phone] if you develop problems with breathing or  chest pain.  If you are unable to contact your doctor or surgical center, you should go to the nearest emergency room or urgent care center.    Physician's telephone #: [phone]    If any questions arise, call your doctor.  If your doctor is not available, please feel free to call the Surgical Center at [phone].     A registered nurse may call you a few days after your surgery to see how you are doing after your procedure.    MEDICATIONS: Resume taking daily medication.  Take prescribed pain medication with food.  If no medication is prescribed, you may take non-aspirin pain medication if needed.  PAIN MEDICATION CAN BE VERY CONSTIPATING.  Take a stool softener or laxative such as senokot, pericolace, or milk of magnesia if needed.    Prescription given for E-prescribed to pharmacy on record (see page 1 of these instructions).  Last pain medication given at 5:15 PM, Oxycodone 5mg liquid.   If your physician has prescribed pain medication that includes Acetaminophen (Tylenol), do not take additional Acetaminophen (Tylenol) while taking the prescribed medication.    Depression / Suicide Risk    As you are discharged from this [facility] facility, it is important to learn how to keep safe from harming yourself.    Recognize the warning signs:  Abrupt changes in personality, positive or negative- including increase in energy   Giving away possessions  Change in eating patterns- significant weight changes-  positive or negative  Change in sleeping patterns- unable to sleep or sleeping all the time   Unwillingness or inability to communicate  Depression  Unusual sadness, discouragement and loneliness  Talk of wanting to die  Neglect of personal appearance   Rebelliousness- reckless behavior  Withdrawal from people/activities they love  Confusion- inability to concentrate     If you or a loved one observes any of these behaviors or has concerns about self-harm, here's what you can do:  Talk about it- your  feelings and reasons for harming yourself  Remove any means that you might use to hurt yourself (examples: pills, rope, extension cords, firearm)  Get professional help from the community (Mental Health, Substance Abuse, psychological counseling)  Do not be alone:Call your Safe Contact- someone whom you trust who will be there for you.  Call your local CRISIS HOTLINE 924-5208 or 459-768-5911  Call your local Children's Mobile Crisis Response Team [place] (531) 969-9420 or www.[employer]  Call the toll free National Suicide Prevention Hotlines   National Suicide Prevention Lifeline 081-952-YCDG (9042)  National Hope Line Network 800-SUICIDE (819-5812)

## 2022-06-24 NOTE — ANESTHESIA PROCEDURE NOTES
Peripheral IV    Date/Time: 6/24/2022 3:20 PM  Performed by: Balaji Zambrano M.D.  Authorized by: Balaji Zambrano M.D.     Size:  20 G  Laterality:  Right  Site Prep:  Alcohol  Technique:  Direct puncture  Attempts:  1

## 2022-06-24 NOTE — ANESTHESIA PROCEDURE NOTES
Airway    Date/Time: 6/24/2022 3:18 PM  Performed by: Balaji Zambrano M.D.  Authorized by: Balaji Zambrano M.D.     Location:  OR  Urgency:  Elective  Difficult Airway: No    Indications for Airway Management:  Anesthesia      Spontaneous Ventilation: absent    Sedation Level:  Deep  Preoxygenated: Yes    Mask Difficulty Assessment:  0 - not attempted  Final Airway Type:  Supraglottic airway  Final Supraglottic Airway:  Standard LMA    SGA Size:  3  Number of Attempts at Approach:  1

## 2022-06-25 NOTE — PROGRESS NOTES
1740-Patient received from PACU. Patient dresses self and applies orthopedic boot to right foot. Sitting in chair with Friend by her side. Discharge instructions reviewed.No questions had by patient.    1800-patient ambulated to bathroom and the placed in wheelchair for discharge.

## 2022-06-25 NOTE — ANESTHESIA POSTPROCEDURE EVALUATION
Patient: Annamaria Babin    Procedure Summary     Date: 06/24/22 Room / Location:  OR 06 / SURGERY [facility]    Anesthesia Start: 1513 Anesthesia Stop: 1613    Procedures:       REMOVAL RIGHT FIBULA EXOSTOSIS (Right Ankle)      DEBRIDEMENT RIGHT FIBULA (Right Ankle) Diagnosis: (EXOSTOSIS)    Surgeons: Vernon Gaspar M.D. Responsible Provider: Balaji Zambrano M.D.    Anesthesia Type: general ASA Status: 2          Final Anesthesia Type: general  Last vitals  BP   Blood Pressure: 124/74    Temp   36.3 °C (97.3 °F)    Pulse   (!) 58   Resp   16    SpO2   95 %      Anesthesia Post Evaluation    Patient location during evaluation: PACU  Patient participation: complete - patient participated  Level of consciousness: awake and alert    Airway patency: patent  Anesthetic complications: no  Cardiovascular status: hemodynamically stable  Respiratory status: acceptable  Hydration status: euvolemic    PONV: none          No complications documented.     Nurse Pain Score: 5 (NPRS)

## 2022-07-11 LAB
APOB+LDLR+PCSK9 GENE MUT ANL BLD/T: NOT DETECTED
BRCA1+BRCA2 DEL+DUP + FULL MUT ANL BLD/T: NOT DETECTED
MLH1+MSH2+MSH6+PMS2 GN DEL+DUP+FUL M: NOT DETECTED

## 2022-07-13 ENCOUNTER — TELEPHONE (OUTPATIENT)
Dept: HEALTH INFORMATION MANAGEMENT | Facility: OTHER | Age: 37
End: 2022-07-13
Payer: MEDICARE

## 2022-07-15 ENCOUNTER — PATIENT MESSAGE (OUTPATIENT)
Dept: MEDICAL GROUP | Facility: MEDICAL CENTER | Age: 37
End: 2022-07-15
Payer: MEDICARE

## 2022-07-15 DIAGNOSIS — E89.0 POSTOPERATIVE HYPOTHYROIDISM: ICD-10-CM

## 2022-08-06 ENCOUNTER — HOSPITAL ENCOUNTER (EMERGENCY)
Facility: MEDICAL CENTER | Age: 37
End: 2022-08-06
Attending: EMERGENCY MEDICINE
Payer: MEDICARE

## 2022-08-06 VITALS
SYSTOLIC BLOOD PRESSURE: 116 MMHG | WEIGHT: 194 LBS | TEMPERATURE: 98.1 F | DIASTOLIC BLOOD PRESSURE: 66 MMHG | BODY MASS INDEX: 33.12 KG/M2 | RESPIRATION RATE: 20 BRPM | HEIGHT: 64 IN | OXYGEN SATURATION: 97 % | HEART RATE: 70 BPM

## 2022-08-06 DIAGNOSIS — R60.9 DEPENDENT EDEMA: ICD-10-CM

## 2022-08-06 DIAGNOSIS — L24.A9 WOUND DRAINAGE: ICD-10-CM

## 2022-08-06 PROCEDURE — 99282 EMERGENCY DEPT VISIT SF MDM: CPT

## 2022-08-06 ASSESSMENT — FIBROSIS 4 INDEX: FIB4 SCORE: 0.48

## 2022-08-06 NOTE — ED NOTES
Dc instructions reviewed with pt. To f/u with ortho, elevate , continue with antibx, return for worsening s/s

## 2022-08-06 NOTE — ED TRIAGE NOTES
"Pt reports the removal of her right fibula exostosis on 6/24/22 with debridement.  Her staples were removed on 7/13/22.  She C/O exudate at the surgical site.  She is currently on antibiotic therapy (on her 5 th day).  Chief Complaint   Patient presents with   • Wound Check   • Post-Op Complications     /84   Pulse 80   Temp 36.1 °C (97 °F) (Temporal)   Resp 20   Ht 1.626 m (5' 4\")   Wt 88 kg (194 lb 0.1 oz)   LMP 07/15/2022   SpO2 97%   BMI 33.30 kg/m²   Has this patient been vaccinated for COVID YES  If not, would they like to be vaccinated while in the ER if eligible?  N/A  Would the patient like to speak with the ERP about the possibility of receiving the COVID vaccine today before making a decision? N/A   "

## 2022-08-06 NOTE — ED PROVIDER NOTES
ED Provider Note    Scribed for Juan C Yan M.D. by Teressa Marx. 8/6/2022  1:40 PM    Primary care provider: Deirdre Cano M.D.  Means of arrival: Walk in  History obtained from: Patient  History limited by: None    CHIEF COMPLAINT  Chief Complaint   Patient presents with   • Wound Check   • Post-Op Complications     HPI  Annamaria Babin is a 37 y.o. female who presents to the Emergency Department for wound drainage onset 3 weeks ago. The patient reports that she had a surgical procedure to remove 3 bone spurs from her right ankle by [facility] in June and a recent removal of the staples 3 weeks ago. Following the staple removal she notes that an orange fluid has been draining from the staple puncture wounds. She states it has decreased in the volume, but denies any color changes of the fluid. She notes that her foot has been swelling at night but she has been wearing an Ace wrap holding gauze over the wound since following up with [facility].. The patient denies symptoms of fever, nausea, body aches, flu like symptoms, chest pain, shortness of breath, calf swelling, or current pain. She reports that she saw her surgeon 4 days ago and was prescribed Cephalexin.     REVIEW OF SYSTEMS  Pertinent positives include: wound drainage.  Pertinent negatives include: fever, nausea, body aches, flu like symptoms, chest pain, shortness of breath, calf swelling, or current pain.    PAST MEDICAL HISTORY  Past Medical History:   Diagnosis Date   • Anemia     iron deficiency   • Anesthesia     PONV   • Arthritis     bilat sacroliitis/hips   • Asthma     prn inhalers   • Enlarged liver    • Gynecological disorder     abnormal uterine bleeding   • H/O thyroidectomy 11/14/2013 2010 for goitre.    • Hashimoto disease    • History of back surgery 11/14/2013    Has had about 5 minimally invasive procedures low back and both hips as SI crest area.     • History of strabismus 11/14/2013    Has surgery both  "eyes for it 1997.     • Indigestion    • Liver disease     non viral hepatitis   • Mastocytosis    • PONV (postoperative nausea and vomiting)    • Psychiatric problem    • Renal disorder     chronic kidney disease-blood in urine, thin basement membrance   • S/P cholecystectomy 11/14/2013   • S/P inguinal hernia repair 11/14/2013    Left side.      SOCIAL HISTORY  Social History     Tobacco Use   • Smoking status: Never Smoker   • Smokeless tobacco: Never Used   Vaping Use   • Vaping Use: Never used   Substance Use Topics   • Alcohol use: Yes     Alcohol/week: 0.0 - 0.6 oz     Comment: Rarely   • Drug use: Not Currently     Types: Marijuana     CURRENT MEDICATIONS  Current Outpatient Medications   Medication Instructions   • acyclovir (ZOVIRAX) 5 % Ointment APPLY TO COLD SORE 4 TIMES DAILY   • albuterol 108 (90 Base) MCG/ACT Aero Soln inhalation aerosol INHALE 2 PUFFS BY MOUTH EVERY 6 HOURS AS NEEDED FOR SHORTNESS OF BREATH   • cetirizine (ZYRTEC) 10 mg, Oral, 2 TIMES DAILY   • famotidine (PEPCID) 20 mg, Oral, 2 TIMES DAILY   • hydrOXYzine HCl (ATARAX) 10 MG Tab TAKE 1 TABLET BY MOUTH THREE TIMES A DAY AS NEEDED   • Iron, Ferrous Gluconate, 256 (28 Fe) MG Tab 1 Tablet, Oral, DAILY   • levothyroxine (SYNTHROID) 175 mcg, Oral, EACH MORNING ON EMPTY STOMACH   • loratadine (CLARITIN) 10 mg, Oral, EVERY DAY   • montelukast (SINGULAIR) 10 mg, Oral, EVERY DAY   • therapeutic multivitamin-minerals (THERAGRAN-M) Tab 1 Tablet, Oral, DAILY     ALLERGIES  Allergies   Allergen Reactions   • Imuran [Azathioprine Sodium] Anaphylaxis     Liver enzymes rise significantly   • Sulfasalazine Hives   • Plaquenil [Hydroxychloroquine Sulfate] Hives   • Vicodin [Hydrocodone-Acetaminophen] Itching   • Other Drug Hives     \"Iv contrast\"   • Other Misc Itching     tape     PHYSICAL EXAM  VITAL SIGNS: /84   Pulse 80   Temp 36.1 °C (97 °F) (Temporal)   Resp 20   Ht 1.626 m (5' 4\")   Wt 88 kg (194 lb 0.1 oz)   LMP 07/15/2022   SpO2 " 97%   BMI 33.30 kg/m²  Reviewed and afebrile, high normal blood pressure  Constitutional :  Well developed, Well nourished.   Skin: Well-healed right ankle surgical wound.  The top 5 mm contains a small fistula draining serous fluid.  No erythema edema or purulence..   Musculoskeletal: no limb deformities.  No tenderness of the ankle  Cardiovascular:: . 1+ pitting edema confined to the foot below where her Ace wrap had been placed, no calf cords, calf swelling or calf tenderness.     ED COURSE:    1:40 PM - Patient seen and examined at bedside. I discussed plan for discharge and follow up as outlined below. The patient is stable for discharge at this time and will return for any new or worsening symptoms. Patient verbalizes understanding and support with my plan for discharge.     MEDICAL DECISION MAKING:  This patient presents with serous drainage from the top of a recent surgical wound.  There is no evidence of any surrounding cellulitis or abscess.  She has edema of the foot likely from Ace wrap use and no evidence of calf edema or calf tenderness or calf pain to suggest DVT.    DISPOSITION: Home    PLAN:  Patient will be discharged home in good condition.   Reassurance  Discontinue Ace wrap  Leg elevation on pillow in bed at night  Follow up with Orthopedics as planned.     [facility]  [address]  [facility] [id number] [phone]    as scheduled the 10th    CONDITION:  Good.    FINAL IMPRESSION:  1. Wound drainage    2. Dependent edema      I, Teressa Marx (Victorino), am scribing for, and in the presence of, Juan C Yan M.D..    Electronically signed by: Teressa Marx (Victorino), 8/6/2022    The note accurately reflects work and decisions made by me.  Juan C Yan M.D.  8/6/2022  3:43 PM

## 2022-08-06 NOTE — DISCHARGE INSTRUCTIONS
Finish the antibiotics.  Continue to bandage with gauze but discontinue the Ace wrap.  Elevate the foot under a pillow at night.  Follow-up as planned.

## 2022-08-09 ENCOUNTER — TELEPHONE (OUTPATIENT)
Dept: SOCIAL WORK | Facility: CLINIC | Age: 37
End: 2022-08-09
Payer: MEDICARE

## 2022-08-10 ENCOUNTER — OFFICE VISIT (OUTPATIENT)
Dept: MEDICAL GROUP | Facility: MEDICAL CENTER | Age: 37
End: 2022-08-10
Payer: MEDICARE

## 2022-08-10 ENCOUNTER — HOSPITAL ENCOUNTER (OUTPATIENT)
Dept: LAB | Facility: MEDICAL CENTER | Age: 37
End: 2022-08-10
Attending: INTERNAL MEDICINE
Payer: MEDICARE

## 2022-08-10 VITALS
OXYGEN SATURATION: 100 % | DIASTOLIC BLOOD PRESSURE: 60 MMHG | HEIGHT: 64 IN | WEIGHT: 190.2 LBS | BODY MASS INDEX: 32.47 KG/M2 | TEMPERATURE: 98 F | HEART RATE: 74 BPM | SYSTOLIC BLOOD PRESSURE: 116 MMHG

## 2022-08-10 DIAGNOSIS — E89.0 POSTOPERATIVE HYPOTHYROIDISM: ICD-10-CM

## 2022-08-10 DIAGNOSIS — K52.9 CHRONIC DIARRHEA: ICD-10-CM

## 2022-08-10 DIAGNOSIS — L50.8 CHRONIC URTICARIA: ICD-10-CM

## 2022-08-10 DIAGNOSIS — F33.2 SEVERE EPISODE OF RECURRENT MAJOR DEPRESSIVE DISORDER, WITHOUT PSYCHOTIC FEATURES (HCC): ICD-10-CM

## 2022-08-10 DIAGNOSIS — B37.31 VAGINAL CANDIDA: ICD-10-CM

## 2022-08-10 DIAGNOSIS — N92.1 MENORRHAGIA WITH IRREGULAR CYCLE: ICD-10-CM

## 2022-08-10 DIAGNOSIS — Z11.3 ROUTINE SCREENING FOR STI (SEXUALLY TRANSMITTED INFECTION): ICD-10-CM

## 2022-08-10 LAB
BASOPHILS # BLD AUTO: 0.3 % (ref 0–1.8)
BASOPHILS # BLD: 0.02 K/UL (ref 0–0.12)
COMMENT 1642: NORMAL
EOSINOPHIL # BLD AUTO: 0.08 K/UL (ref 0–0.51)
EOSINOPHIL NFR BLD: 1.4 % (ref 0–6.9)
ERYTHROCYTE [DISTWIDTH] IN BLOOD BY AUTOMATED COUNT: 39.9 FL (ref 35.9–50)
FERRITIN SERPL-MCNC: 6.5 NG/ML (ref 10–291)
HAV IGM SERPL QL IA: NORMAL
HBV CORE IGM SER QL: NORMAL
HBV SURFACE AG SER QL: NORMAL
HCT VFR BLD AUTO: 38.3 % (ref 37–47)
HCV AB SER QL: NORMAL
HGB BLD-MCNC: 11.2 G/DL (ref 12–16)
HIV 1+2 AB+HIV1 P24 AG SERPL QL IA: NORMAL
HYPOCHROMIA BLD QL SMEAR: ABNORMAL
IMM GRANULOCYTES # BLD AUTO: 0.01 K/UL (ref 0–0.11)
IMM GRANULOCYTES NFR BLD AUTO: 0.2 % (ref 0–0.9)
IRON SATN MFR SERPL: 5 % (ref 15–55)
IRON SERPL-MCNC: 24 UG/DL (ref 40–170)
LG PLATELETS BLD QL SMEAR: NORMAL
LYMPHOCYTES # BLD AUTO: 1.65 K/UL (ref 1–4.8)
LYMPHOCYTES NFR BLD: 28.2 % (ref 22–41)
MCH RBC QN AUTO: 21.1 PG (ref 27–33)
MCHC RBC AUTO-ENTMCNC: 29.2 G/DL (ref 33.6–35)
MCV RBC AUTO: 72.1 FL (ref 81.4–97.8)
MONOCYTES # BLD AUTO: 0.47 K/UL (ref 0–0.85)
MONOCYTES NFR BLD AUTO: 8 % (ref 0–13.4)
NEUTROPHILS # BLD AUTO: 3.63 K/UL (ref 2–7.15)
NEUTROPHILS NFR BLD: 61.9 % (ref 44–72)
NRBC # BLD AUTO: 0 K/UL
NRBC BLD-RTO: 0 /100 WBC
PLATELET # BLD AUTO: 374 K/UL (ref 164–446)
PLATELET BLD QL SMEAR: NORMAL
PMV BLD AUTO: 10.2 FL (ref 9–12.9)
RBC # BLD AUTO: 5.31 M/UL (ref 4.2–5.4)
RBC BLD AUTO: PRESENT
T PALLIDUM AB SER QL IA: NORMAL
T4 FREE SERPL-MCNC: 2.14 NG/DL (ref 0.93–1.7)
TIBC SERPL-MCNC: 469 UG/DL (ref 250–450)
TSH SERPL DL<=0.005 MIU/L-ACNC: 0.06 UIU/ML (ref 0.38–5.33)
UIBC SERPL-MCNC: 445 UG/DL (ref 110–370)
WBC # BLD AUTO: 5.9 K/UL (ref 4.8–10.8)

## 2022-08-10 PROCEDURE — 36415 COLL VENOUS BLD VENIPUNCTURE: CPT

## 2022-08-10 PROCEDURE — 86780 TREPONEMA PALLIDUM: CPT

## 2022-08-10 PROCEDURE — 87389 HIV-1 AG W/HIV-1&-2 AB AG IA: CPT

## 2022-08-10 PROCEDURE — 96372 THER/PROPH/DIAG INJ SC/IM: CPT | Performed by: INTERNAL MEDICINE

## 2022-08-10 PROCEDURE — 99214 OFFICE O/P EST MOD 30 MIN: CPT | Mod: 25 | Performed by: INTERNAL MEDICINE

## 2022-08-10 PROCEDURE — 87591 N.GONORRHOEAE DNA AMP PROB: CPT

## 2022-08-10 PROCEDURE — 85025 COMPLETE CBC W/AUTO DIFF WBC: CPT

## 2022-08-10 PROCEDURE — 82728 ASSAY OF FERRITIN: CPT

## 2022-08-10 PROCEDURE — 84443 ASSAY THYROID STIM HORMONE: CPT

## 2022-08-10 PROCEDURE — 83550 IRON BINDING TEST: CPT

## 2022-08-10 PROCEDURE — 84439 ASSAY OF FREE THYROXINE: CPT

## 2022-08-10 PROCEDURE — 83540 ASSAY OF IRON: CPT

## 2022-08-10 PROCEDURE — 87491 CHLMYD TRACH DNA AMP PROBE: CPT

## 2022-08-10 PROCEDURE — 80074 ACUTE HEPATITIS PANEL: CPT

## 2022-08-10 RX ORDER — MEDROXYPROGESTERONE ACETATE 150 MG/ML
150 INJECTION, SUSPENSION INTRAMUSCULAR ONCE
Status: COMPLETED | OUTPATIENT
Start: 2022-08-10 | End: 2022-08-10

## 2022-08-10 RX ORDER — FLUOXETINE 10 MG/1
10 CAPSULE ORAL DAILY
Qty: 30 CAPSULE | Refills: 1 | Status: SHIPPED | OUTPATIENT
Start: 2022-08-10 | End: 2022-09-07

## 2022-08-10 RX ORDER — FLUCONAZOLE 150 MG/1
150 TABLET ORAL DAILY
Qty: 1 TABLET | Refills: 0 | Status: SHIPPED | OUTPATIENT
Start: 2022-08-10 | End: 2022-09-14 | Stop reason: SDUPTHER

## 2022-08-10 RX ADMIN — MEDROXYPROGESTERONE ACETATE 150 MG: 150 INJECTION, SUSPENSION INTRAMUSCULAR at 13:33

## 2022-08-10 ASSESSMENT — FIBROSIS 4 INDEX: FIB4 SCORE: 0.48

## 2022-08-11 NOTE — PROGRESS NOTES
Established Patient    Annamaria presents today with the following:    CC: Frequent and heavy menstrual bleeding, yeast infections, depression    HPI:   Annamaria is a 37 y.o. female who came in for above.  She came in for Pap smear but she is currently menstruating.  Since her thyroid supplement dose is reduced for iatrogenic hypothyroidism, she has been having frequent menstruation, 2 times per month, 1 week each time, heavy flow and cramps. This irregularity started after her tubal ligation a few years ago.   She is wondering if it is related to thyroid or tubal ligation.    She recently finished antibiotics treatment for post op wound infection in her ankle. She has developed itchiness, redness in her vulva. She would like to check for STD.    She did not get prescription for Prozac at the pharmacy, so her mood remains uncontrolled with Wellbutrin alone.    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 1 Tablet 0    FLUoxetine (PROZAC) 10 MG Cap Take 1 Capsule by mouth every day. 30 Capsule 1    levothyroxine (SYNTHROID) 175 MCG Tab Take 1 Tablet by mouth every morning on an empty stomach. 90 Tablet 0    Iron, Ferrous Gluconate, 256 (28 Fe) MG Tab Take 1 Tablet by mouth every day. 90 Tablet 1    montelukast (SINGULAIR) 10 MG Tab Take 1 Tablet by mouth every  "day. 90 Tablet 3    loratadine (CLARITIN) 10 MG Tab Take 1 Tablet by mouth every day. 90 Tablet 1    cetirizine (ZYRTEC) 10 MG Tab Take 1 Tablet by mouth 2 times a day. 180 Tablet 3    hydrOXYzine HCl (ATARAX) 10 MG Tab TAKE 1 TABLET BY MOUTH THREE TIMES A DAY AS NEEDED 270 Tablet 1    albuterol 108 (90 Base) MCG/ACT Aero Soln inhalation aerosol INHALE 2 PUFFS BY MOUTH EVERY 6 HOURS AS NEEDED FOR SHORTNESS OF BREATH 8.5 Each 6    famotidine (PEPCID) 20 MG Tab Take 1 Tab by mouth 2 times a day. 60 Tab 11    acyclovir (ZOVIRAX) 5 % Ointment APPLY TO COLD SORE 4 TIMES DAILY 30 g 0    therapeutic multivitamin-minerals (THERAGRAN-M) Tab Take 1 Tab by mouth every day.       No current facility-administered medications for this visit.         /60 (BP Location: Left arm, Patient Position: Sitting, BP Cuff Size: Small adult)   Pulse 74   Temp 36.7 °C (98 °F) (Temporal)   Ht 1.626 m (5' 4\")   Wt 86.3 kg (190 lb 3.2 oz)   LMP 07/15/2022   SpO2 100%   BMI 32.65 kg/m²     Physical Exam  General: Alert and oriented, No apparent distress.  Genital: Rash with satellite lesions on vulva consistent with candidiasis.  Extremities: No clubbing, cyanosis, edema.        Assessment and Plan    1. Menorrhagia with irregular cycle  Discussed with her thyroid dose definitely needs to be reduced since she is overtly hyperthyroid on previous lab.   Menorrhagia can be dealt differently by using hormonal contraceptive. She should avoid estrogen containing therapy due to migraine with aura.  Discussed oral progesterone, Depo-Provera, IUD . she would like to go with Depo-Provera currently.  - medroxyPROGESTERone (DEPO-PROVERA) injection 150 mg  - r/o other concerning causes with US-PELVIC COMPLETE (TRANSABDOMINAL/TRANSVAGINAL) (COMBO); Future  - CBC WITH DIFFERENTIAL; Future  - taking oral iron, but likely still deficient. Will do infusion if still iron deficient.    2. Vaginal candida  - fluconazole (DIFLUCAN) 150 MG tablet; Take 1 " Tablet by mouth every day.  Dispense: 1 Tablet; Refill: 0    3. Severe episode of recurrent major depressive disorder, without psychotic features (HCC)  - start FLUoxetine (PROZAC) 10 MG Cap; Take 1 Capsule by mouth every day.  Dispense: 30 Capsule; Refill: 1    4. Chronic diarrhea  She would like to check for carcinoid syndrome since Dx was equivocal years ago. Ordered 24 hrurinary 5HIAA.    5. Routine screening for STI (sexually transmitted infection)  - T.PALLIDUM AB EIA; Future  - HEPATITIS PANEL ACUTE(4 COMPONENTS); Future  - Chlamydia/GC, PCR (Urine); Future  - HIV AG/AB COMBO ASSAY SCREENING; Future    6. Chronic urticaria  Uncontrolled and easily provoked. With dietary sensitivities, ? Mast cell disease as diagnosed by [facility] before, she would like to see allergist.    Return in about 1 month (around 9/10/2022).        Signed by: Deirdre Cano M.D.

## 2022-08-11 NOTE — TELEPHONE ENCOUNTER
ED Follow-up  Call Attempts: 3  Date of ED visit: 08/06/22  Call Outcome: 3 attempts made / unable to contact patient

## 2022-08-12 DIAGNOSIS — E89.0 POSTOPERATIVE HYPOTHYROIDISM: ICD-10-CM

## 2022-08-12 DIAGNOSIS — D50.0 IRON DEFICIENCY ANEMIA DUE TO CHRONIC BLOOD LOSS: ICD-10-CM

## 2022-08-12 RX ORDER — 0.9 % SODIUM CHLORIDE 0.9 %
10 VIAL (ML) INJECTION PRN
Status: CANCELLED | OUTPATIENT
Start: 2022-08-12

## 2022-08-12 RX ORDER — 0.9 % SODIUM CHLORIDE 0.9 %
3 VIAL (ML) INJECTION PRN
Status: CANCELLED | OUTPATIENT
Start: 2022-08-12

## 2022-08-12 RX ORDER — HEPARIN SODIUM (PORCINE) LOCK FLUSH IV SOLN 100 UNIT/ML 100 UNIT/ML
500 SOLUTION INTRAVENOUS PRN
Status: CANCELLED | OUTPATIENT
Start: 2022-08-12

## 2022-08-12 RX ORDER — EPINEPHRINE 1 MG/ML(1)
0.5 AMPUL (ML) INJECTION PRN
Status: CANCELLED | OUTPATIENT
Start: 2022-08-12

## 2022-08-12 RX ORDER — DIPHENHYDRAMINE HYDROCHLORIDE 50 MG/ML
50 INJECTION INTRAMUSCULAR; INTRAVENOUS ONCE
Status: CANCELLED | OUTPATIENT
Start: 2022-08-12 | End: 2022-08-12

## 2022-08-12 RX ORDER — 0.9 % SODIUM CHLORIDE 0.9 %
VIAL (ML) INJECTION PRN
Status: CANCELLED | OUTPATIENT
Start: 2022-08-12

## 2022-08-12 RX ORDER — SODIUM CHLORIDE 9 MG/ML
INJECTION, SOLUTION INTRAVENOUS CONTINUOUS
Status: CANCELLED | OUTPATIENT
Start: 2022-08-12

## 2022-08-12 RX ORDER — LEVOTHYROXINE SODIUM 0.15 MG/1
150 TABLET ORAL
Qty: 90 TABLET | Refills: 0 | Status: SHIPPED | OUTPATIENT
Start: 2022-08-12 | End: 2022-11-08

## 2022-08-12 RX ORDER — ALBUTEROL SULFATE 90 UG/1
2 AEROSOL, METERED RESPIRATORY (INHALATION) PRN
Status: CANCELLED | OUTPATIENT
Start: 2022-08-12

## 2022-08-12 RX ORDER — METHYLPREDNISOLONE SODIUM SUCCINATE 125 MG/2ML
125 INJECTION, POWDER, LYOPHILIZED, FOR SOLUTION INTRAMUSCULAR; INTRAVENOUS PRN
Status: CANCELLED | OUTPATIENT
Start: 2022-08-12

## 2022-08-13 ENCOUNTER — HOSPITAL ENCOUNTER (OUTPATIENT)
Facility: MEDICAL CENTER | Age: 37
End: 2022-08-13
Attending: INTERNAL MEDICINE
Payer: MEDICARE

## 2022-08-13 PROCEDURE — 83497 ASSAY OF 5-HIAA: CPT

## 2022-08-17 LAB
5HIAA & CREATININE UR-IMP: ABNORMAL
5OH-INDOLEACETATE 24H UR-MCNC: 1.7 MG/L
5OH-INDOLEACETATE 24H UR-MRATE: 4 MG/D (ref 0–15)
5OH-INDOLEACETATE/CREAT 24H UR: 2 MG/GCR (ref 0–14)
COLLECT DURATION TIME SPEC: 24 HRS
CREAT 24H UR-MCNC: 89 MG/DL
CREAT 24H UR-MRATE: 2002 MG/D (ref 700–1600)
SPECIMEN VOL ?TM UR: 2250 ML

## 2022-08-21 ENCOUNTER — OUTPATIENT INFUSION SERVICES (OUTPATIENT)
Dept: ONCOLOGY | Facility: MEDICAL CENTER | Age: 37
End: 2022-08-21
Attending: INTERNAL MEDICINE
Payer: MEDICARE

## 2022-08-21 VITALS
HEIGHT: 65 IN | SYSTOLIC BLOOD PRESSURE: 102 MMHG | OXYGEN SATURATION: 97 % | BODY MASS INDEX: 31.4 KG/M2 | HEART RATE: 66 BPM | WEIGHT: 188.49 LBS | RESPIRATION RATE: 18 BRPM | DIASTOLIC BLOOD PRESSURE: 62 MMHG | TEMPERATURE: 98.4 F

## 2022-08-21 DIAGNOSIS — D50.0 IRON DEFICIENCY ANEMIA DUE TO CHRONIC BLOOD LOSS: ICD-10-CM

## 2022-08-21 PROCEDURE — 96365 THER/PROPH/DIAG IV INF INIT: CPT

## 2022-08-21 PROCEDURE — 700111 HCHG RX REV CODE 636 W/ 250 OVERRIDE (IP): Performed by: INTERNAL MEDICINE

## 2022-08-21 PROCEDURE — 700105 HCHG RX REV CODE 258: Performed by: INTERNAL MEDICINE

## 2022-08-21 RX ORDER — 0.9 % SODIUM CHLORIDE 0.9 %
VIAL (ML) INJECTION PRN
Status: CANCELLED | OUTPATIENT
Start: 2022-08-28

## 2022-08-21 RX ORDER — 0.9 % SODIUM CHLORIDE 0.9 %
10 VIAL (ML) INJECTION PRN
Status: CANCELLED | OUTPATIENT
Start: 2022-08-28

## 2022-08-21 RX ORDER — SODIUM CHLORIDE 9 MG/ML
INJECTION, SOLUTION INTRAVENOUS CONTINUOUS
Status: CANCELLED | OUTPATIENT
Start: 2022-08-28

## 2022-08-21 RX ORDER — HEPARIN SODIUM (PORCINE) LOCK FLUSH IV SOLN 100 UNIT/ML 100 UNIT/ML
500 SOLUTION INTRAVENOUS PRN
Status: CANCELLED | OUTPATIENT
Start: 2022-08-28

## 2022-08-21 RX ORDER — METHYLPREDNISOLONE SODIUM SUCCINATE 125 MG/2ML
125 INJECTION, POWDER, LYOPHILIZED, FOR SOLUTION INTRAMUSCULAR; INTRAVENOUS PRN
Status: CANCELLED | OUTPATIENT
Start: 2022-08-28

## 2022-08-21 RX ORDER — EPINEPHRINE 1 MG/ML(1)
0.5 AMPUL (ML) INJECTION PRN
Status: CANCELLED | OUTPATIENT
Start: 2022-08-28

## 2022-08-21 RX ORDER — ALBUTEROL SULFATE 90 UG/1
2 AEROSOL, METERED RESPIRATORY (INHALATION) PRN
Status: CANCELLED | OUTPATIENT
Start: 2022-08-28

## 2022-08-21 RX ORDER — DIPHENHYDRAMINE HYDROCHLORIDE 50 MG/ML
50 INJECTION INTRAMUSCULAR; INTRAVENOUS ONCE
Status: CANCELLED | OUTPATIENT
Start: 2022-08-28 | End: 2022-08-28

## 2022-08-21 RX ORDER — 0.9 % SODIUM CHLORIDE 0.9 %
3 VIAL (ML) INJECTION PRN
Status: CANCELLED | OUTPATIENT
Start: 2022-08-28

## 2022-08-21 RX ADMIN — FERUMOXYTOL 510 MG: 510 INJECTION INTRAVENOUS at 10:41

## 2022-08-21 ASSESSMENT — FIBROSIS 4 INDEX: FIB4 SCORE: 0.37

## 2022-08-21 NOTE — PROGRESS NOTES
[facility]namaria into [facility] feraheme infusion. Pt denied having any new or acute complaints today, reports she has had this medication before and tolerated past treatments well. PIV started, had + blood return flushed briskly. Pt given feraheme as prescribed, tolerated well, denied having any complaints during or after infusion. Observed for 30 minutes and monitored vitals as ordered. PIV discontinued, bleeding controlled with gauze and coban. Next appointment scheduled, Annamaria discharged home to self care.

## 2022-08-22 ENCOUNTER — ANESTHESIA (OUTPATIENT)
Dept: SURGERY | Facility: MEDICAL CENTER | Age: 37
End: 2022-08-22
Payer: MEDICARE

## 2022-08-22 ENCOUNTER — APPOINTMENT (OUTPATIENT)
Dept: RADIOLOGY | Facility: MEDICAL CENTER | Age: 37
End: 2022-08-22
Attending: ORTHOPAEDIC SURGERY
Payer: MEDICARE

## 2022-08-22 ENCOUNTER — ANESTHESIA EVENT (OUTPATIENT)
Dept: SURGERY | Facility: MEDICAL CENTER | Age: 37
End: 2022-08-22
Payer: MEDICARE

## 2022-08-22 ENCOUNTER — HOSPITAL ENCOUNTER (OUTPATIENT)
Facility: MEDICAL CENTER | Age: 37
End: 2022-08-22
Attending: ORTHOPAEDIC SURGERY | Admitting: ORTHOPAEDIC SURGERY
Payer: MEDICARE

## 2022-08-22 VITALS
TEMPERATURE: 97.9 F | DIASTOLIC BLOOD PRESSURE: 73 MMHG | HEIGHT: 64 IN | WEIGHT: 189.15 LBS | RESPIRATION RATE: 16 BRPM | HEART RATE: 82 BPM | OXYGEN SATURATION: 98 % | SYSTOLIC BLOOD PRESSURE: 125 MMHG | BODY MASS INDEX: 32.29 KG/M2

## 2022-08-22 LAB
GRAM STN SPEC: NORMAL
HCG UR QL: NEGATIVE
SIGNIFICANT IND 70042: NORMAL
SITE SITE: NORMAL
SOURCE SOURCE: NORMAL

## 2022-08-22 PROCEDURE — 160048 HCHG OR STATISTICAL LEVEL 1-5: Performed by: ORTHOPAEDIC SURGERY

## 2022-08-22 PROCEDURE — 87075 CULTR BACTERIA EXCEPT BLOOD: CPT | Mod: 91

## 2022-08-22 PROCEDURE — 160025 RECOVERY II MINUTES (STATS): Performed by: ORTHOPAEDIC SURGERY

## 2022-08-22 PROCEDURE — 700105 HCHG RX REV CODE 258: Performed by: ANESTHESIOLOGY

## 2022-08-22 PROCEDURE — 700101 HCHG RX REV CODE 250: Performed by: ORTHOPAEDIC SURGERY

## 2022-08-22 PROCEDURE — 87015 SPECIMEN INFECT AGNT CONCNTJ: CPT

## 2022-08-22 PROCEDURE — 00300 ANES ALL PX INTEG H/N/PTRUNK: CPT | Performed by: ANESTHESIOLOGY

## 2022-08-22 PROCEDURE — 87070 CULTURE OTHR SPECIMN AEROBIC: CPT

## 2022-08-22 PROCEDURE — 160027 HCHG SURGERY MINUTES - 1ST 30 MINS LEVEL 2: Performed by: ORTHOPAEDIC SURGERY

## 2022-08-22 PROCEDURE — 160002 HCHG RECOVERY MINUTES (STAT): Performed by: ORTHOPAEDIC SURGERY

## 2022-08-22 PROCEDURE — 81025 URINE PREGNANCY TEST: CPT

## 2022-08-22 PROCEDURE — 160038 HCHG SURGERY MINUTES - EA ADDL 1 MIN LEVEL 2: Performed by: ORTHOPAEDIC SURGERY

## 2022-08-22 PROCEDURE — 160009 HCHG ANES TIME/MIN: Performed by: ORTHOPAEDIC SURGERY

## 2022-08-22 PROCEDURE — 87205 SMEAR GRAM STAIN: CPT | Mod: 91

## 2022-08-22 PROCEDURE — 160046 HCHG PACU - 1ST 60 MINS PHASE II: Performed by: ORTHOPAEDIC SURGERY

## 2022-08-22 PROCEDURE — 700101 HCHG RX REV CODE 250: Performed by: ANESTHESIOLOGY

## 2022-08-22 PROCEDURE — 160035 HCHG PACU - 1ST 60 MINS PHASE I: Performed by: ORTHOPAEDIC SURGERY

## 2022-08-22 PROCEDURE — 160036 HCHG PACU - EA ADDL 30 MINS PHASE I: Performed by: ORTHOPAEDIC SURGERY

## 2022-08-22 PROCEDURE — 700111 HCHG RX REV CODE 636 W/ 250 OVERRIDE (IP): Performed by: ANESTHESIOLOGY

## 2022-08-22 RX ORDER — ONDANSETRON 2 MG/ML
INJECTION INTRAMUSCULAR; INTRAVENOUS PRN
Status: DISCONTINUED | OUTPATIENT
Start: 2022-08-22 | End: 2022-08-22 | Stop reason: SURG

## 2022-08-22 RX ORDER — SODIUM CHLORIDE, SODIUM LACTATE, POTASSIUM CHLORIDE, CALCIUM CHLORIDE 600; 310; 30; 20 MG/100ML; MG/100ML; MG/100ML; MG/100ML
INJECTION, SOLUTION INTRAVENOUS CONTINUOUS
Status: DISCONTINUED | OUTPATIENT
Start: 2022-08-22 | End: 2022-08-22 | Stop reason: HOSPADM

## 2022-08-22 RX ORDER — HYDROMORPHONE HYDROCHLORIDE 1 MG/ML
0.4 INJECTION, SOLUTION INTRAMUSCULAR; INTRAVENOUS; SUBCUTANEOUS
Status: DISCONTINUED | OUTPATIENT
Start: 2022-08-22 | End: 2022-08-22 | Stop reason: HOSPADM

## 2022-08-22 RX ORDER — HYDROMORPHONE HYDROCHLORIDE 1 MG/ML
0.2 INJECTION, SOLUTION INTRAMUSCULAR; INTRAVENOUS; SUBCUTANEOUS
Status: DISCONTINUED | OUTPATIENT
Start: 2022-08-22 | End: 2022-08-22 | Stop reason: HOSPADM

## 2022-08-22 RX ORDER — ONDANSETRON 2 MG/ML
4 INJECTION INTRAMUSCULAR; INTRAVENOUS
Status: DISCONTINUED | OUTPATIENT
Start: 2022-08-22 | End: 2022-08-22 | Stop reason: HOSPADM

## 2022-08-22 RX ORDER — LIDOCAINE HYDROCHLORIDE 20 MG/ML
INJECTION, SOLUTION EPIDURAL; INFILTRATION; INTRACAUDAL; PERINEURAL PRN
Status: DISCONTINUED | OUTPATIENT
Start: 2022-08-22 | End: 2022-08-22 | Stop reason: SURG

## 2022-08-22 RX ORDER — ALBUTEROL SULFATE 2.5 MG/3ML
2.5 SOLUTION RESPIRATORY (INHALATION)
Status: DISCONTINUED | OUTPATIENT
Start: 2022-08-22 | End: 2022-08-22 | Stop reason: HOSPADM

## 2022-08-22 RX ORDER — CEFAZOLIN SODIUM 1 G/3ML
INJECTION, POWDER, FOR SOLUTION INTRAMUSCULAR; INTRAVENOUS PRN
Status: DISCONTINUED | OUTPATIENT
Start: 2022-08-22 | End: 2022-08-22 | Stop reason: SURG

## 2022-08-22 RX ORDER — MIDAZOLAM HYDROCHLORIDE 1 MG/ML
1 INJECTION INTRAMUSCULAR; INTRAVENOUS
Status: DISCONTINUED | OUTPATIENT
Start: 2022-08-22 | End: 2022-08-22 | Stop reason: HOSPADM

## 2022-08-22 RX ORDER — OXYCODONE HCL 5 MG/5 ML
5 SOLUTION, ORAL ORAL
Status: DISCONTINUED | OUTPATIENT
Start: 2022-08-22 | End: 2022-08-22 | Stop reason: HOSPADM

## 2022-08-22 RX ORDER — PHENYLEPHRINE HCL IN 0.9% NACL 0.5 MG/5ML
SYRINGE (ML) INTRAVENOUS PRN
Status: DISCONTINUED | OUTPATIENT
Start: 2022-08-22 | End: 2022-08-22 | Stop reason: SURG

## 2022-08-22 RX ORDER — MIDAZOLAM HYDROCHLORIDE 1 MG/ML
INJECTION INTRAMUSCULAR; INTRAVENOUS PRN
Status: DISCONTINUED | OUTPATIENT
Start: 2022-08-22 | End: 2022-08-22 | Stop reason: SURG

## 2022-08-22 RX ORDER — HYDROMORPHONE HYDROCHLORIDE 1 MG/ML
0.1 INJECTION, SOLUTION INTRAMUSCULAR; INTRAVENOUS; SUBCUTANEOUS
Status: DISCONTINUED | OUTPATIENT
Start: 2022-08-22 | End: 2022-08-22 | Stop reason: HOSPADM

## 2022-08-22 RX ORDER — BUPIVACAINE HYDROCHLORIDE AND EPINEPHRINE 5; 5 MG/ML; UG/ML
INJECTION, SOLUTION PERINEURAL
Status: DISCONTINUED | OUTPATIENT
Start: 2022-08-22 | End: 2022-08-22 | Stop reason: HOSPADM

## 2022-08-22 RX ORDER — MEPERIDINE HYDROCHLORIDE 25 MG/ML
12.5 INJECTION INTRAMUSCULAR; INTRAVENOUS; SUBCUTANEOUS
Status: DISCONTINUED | OUTPATIENT
Start: 2022-08-22 | End: 2022-08-22 | Stop reason: HOSPADM

## 2022-08-22 RX ORDER — HYDRALAZINE HYDROCHLORIDE 20 MG/ML
5 INJECTION INTRAMUSCULAR; INTRAVENOUS
Status: DISCONTINUED | OUTPATIENT
Start: 2022-08-22 | End: 2022-08-22 | Stop reason: HOSPADM

## 2022-08-22 RX ORDER — METOPROLOL TARTRATE 1 MG/ML
1 INJECTION, SOLUTION INTRAVENOUS
Status: DISCONTINUED | OUTPATIENT
Start: 2022-08-22 | End: 2022-08-22 | Stop reason: HOSPADM

## 2022-08-22 RX ORDER — DIPHENHYDRAMINE HYDROCHLORIDE 50 MG/ML
12.5 INJECTION INTRAMUSCULAR; INTRAVENOUS
Status: DISCONTINUED | OUTPATIENT
Start: 2022-08-22 | End: 2022-08-22 | Stop reason: HOSPADM

## 2022-08-22 RX ORDER — DIPHENHYDRAMINE HYDROCHLORIDE 50 MG/ML
INJECTION INTRAMUSCULAR; INTRAVENOUS PRN
Status: DISCONTINUED | OUTPATIENT
Start: 2022-08-22 | End: 2022-08-22 | Stop reason: SURG

## 2022-08-22 RX ORDER — SODIUM CHLORIDE, SODIUM LACTATE, POTASSIUM CHLORIDE, CALCIUM CHLORIDE 600; 310; 30; 20 MG/100ML; MG/100ML; MG/100ML; MG/100ML
INJECTION, SOLUTION INTRAVENOUS
Status: DISCONTINUED | OUTPATIENT
Start: 2022-08-22 | End: 2022-08-22 | Stop reason: SURG

## 2022-08-22 RX ORDER — OXYCODONE HCL 5 MG/5 ML
10 SOLUTION, ORAL ORAL
Status: DISCONTINUED | OUTPATIENT
Start: 2022-08-22 | End: 2022-08-22 | Stop reason: HOSPADM

## 2022-08-22 RX ORDER — DEXAMETHASONE SODIUM PHOSPHATE 4 MG/ML
INJECTION, SOLUTION INTRA-ARTICULAR; INTRALESIONAL; INTRAMUSCULAR; INTRAVENOUS; SOFT TISSUE PRN
Status: DISCONTINUED | OUTPATIENT
Start: 2022-08-22 | End: 2022-08-22 | Stop reason: SURG

## 2022-08-22 RX ORDER — HALOPERIDOL 5 MG/ML
1 INJECTION INTRAMUSCULAR
Status: DISCONTINUED | OUTPATIENT
Start: 2022-08-22 | End: 2022-08-22 | Stop reason: HOSPADM

## 2022-08-22 RX ADMIN — Medication 100 MCG: at 12:00

## 2022-08-22 RX ADMIN — LIDOCAINE HYDROCHLORIDE 80 MG: 20 INJECTION, SOLUTION EPIDURAL; INFILTRATION; INTRACAUDAL at 11:56

## 2022-08-22 RX ADMIN — ONDANSETRON 4 MG: 2 INJECTION INTRAMUSCULAR; INTRAVENOUS at 11:56

## 2022-08-22 RX ADMIN — FENTANYL CITRATE 100 MCG: 50 INJECTION, SOLUTION INTRAMUSCULAR; INTRAVENOUS at 12:07

## 2022-08-22 RX ADMIN — MIDAZOLAM HYDROCHLORIDE 2 MG: 1 INJECTION, SOLUTION INTRAMUSCULAR; INTRAVENOUS at 11:56

## 2022-08-22 RX ADMIN — PROPOFOL 200 MG: 10 INJECTION, EMULSION INTRAVENOUS at 11:56

## 2022-08-22 RX ADMIN — SODIUM CHLORIDE, POTASSIUM CHLORIDE, SODIUM LACTATE AND CALCIUM CHLORIDE: 600; 310; 30; 20 INJECTION, SOLUTION INTRAVENOUS at 11:49

## 2022-08-22 RX ADMIN — DIPHENHYDRAMINE HYDROCHLORIDE 25 MG: 50 INJECTION, SOLUTION INTRAMUSCULAR; INTRAVENOUS at 11:56

## 2022-08-22 RX ADMIN — CEFAZOLIN 2 G: 330 INJECTION, POWDER, FOR SOLUTION INTRAMUSCULAR; INTRAVENOUS at 12:18

## 2022-08-22 RX ADMIN — DEXAMETHASONE SODIUM PHOSPHATE 8 MG: 4 INJECTION, SOLUTION INTRA-ARTICULAR; INTRALESIONAL; INTRAMUSCULAR; INTRAVENOUS; SOFT TISSUE at 11:56

## 2022-08-22 RX ADMIN — FENTANYL CITRATE 100 MCG: 50 INJECTION, SOLUTION INTRAMUSCULAR; INTRAVENOUS at 11:56

## 2022-08-22 ASSESSMENT — PAIN DESCRIPTION - PAIN TYPE
TYPE: SURGICAL PAIN
TYPE: SURGICAL PAIN

## 2022-08-22 ASSESSMENT — FIBROSIS 4 INDEX: FIB4 SCORE: 0.37

## 2022-08-22 ASSESSMENT — PAIN SCALES - GENERAL: PAIN_LEVEL: 4

## 2022-08-22 NOTE — ANESTHESIA TIME REPORT
Anesthesia Start and Stop Event Times     Date Time Event    8/22/2022 1142 Ready for Procedure     1149 Anesthesia Start     1245 Anesthesia Stop        Responsible Staff  08/22/22    Name Role Begin End    Balaji Baez D.O. Anesth 1149 1245        Overtime Reason:  no overtime (within assigned shift)    Comments:

## 2022-08-22 NOTE — OR NURSING
1315 Report received from LIO Serrano. Pt resting s/p I&D of right ankle. Surgical dressings to right ankle. Toes to affected extremity are pink and warm, brisk cap refill observed.     1329 Pt reports 6/10 tolerable pain to her right foot heel. Pt denies nausea.     1335 Message left for pt's contact.    1340 Dr Gaspar at the bedside to discuss the procedure.     1344 No changes.     1345 Pt transferred to the discharge area.    Pt discharged to the care of an adult following an uneventful stay in the discharge area.

## 2022-08-22 NOTE — ANESTHESIA PROCEDURE NOTES
Airway    Date/Time: 8/22/2022 11:56 AM  Performed by: Balaji Baez D.O.  Authorized by: Balaji Baez D.O.     Location:  OR  Urgency:  Elective  Indications for Airway Management:  Anesthesia      Spontaneous Ventilation: absent    Sedation Level:  Deep  Preoxygenated: Yes    Mask Difficulty Assessment:  0 - not attempted  Final Airway Type:  Supraglottic airway  Final Supraglottic Airway:  Standard LMA    SGA Size:  4  Number of Attempts at Approach:  1

## 2022-08-22 NOTE — ANESTHESIA PREPROCEDURE EVALUATION
Case: [id number] Date/Time: 08/22/22 1145    Procedure: IRRIGATION AND DEBRIDEMENT,RIGHT ANKLE WOUND    Pre-op diagnosis: WOUND DEHISCENCE    Location: [facility] OR 02 / SURGERY [facility]    Surgeons: Vernon Gaspar M.D.          Relevant Problems   PULMONARY   (positive) Mild intermittent asthma      NEURO   (positive) Migraine headache      CARDIAC   (positive) Migraine headache         (positive) Thin basement membrane nephropathy      ENDO   (positive) Postoperative hypothyroidism      Other   (positive) Mast cell disease       Physical Exam    Airway   Mallampati: II  TM distance: >3 FB  Neck ROM: full       Cardiovascular - normal exam  Rhythm: regular  Rate: normal  (-) murmur     Dental - normal exam           Pulmonary - normal exam  Breath sounds clear to auscultation     Abdominal    Neurological - normal exam                 Anesthesia Plan    ASA 2       Plan - general       Airway plan will be LMA          Induction: intravenous    Postoperative Plan: Postoperative administration of opioids is intended.    Pertinent diagnostic labs and testing reviewed    Informed Consent:    Anesthetic plan and risks discussed with patient.    Use of blood products discussed with: patient whom consented to blood products.

## 2022-08-22 NOTE — DISCHARGE INSTRUCTIONS
ACTIVITY: Rest and take it easy for the first 24 hours.  A responsible adult is recommended to remain with you during that time.  It is normal to feel sleepy.  We encourage you to not do anything that requires balance, judgment or coordination.    MILD FLU-LIKE SYMPTOMS ARE NORMAL. YOU MAY EXPERIENCE GENERALIZED MUSCLE ACHES, THROAT IRRITATION, HEADACHE AND/OR SOME NAUSEA.    FOR 24 HOURS DO NOT:  Drive, operate machinery or run household appliances.  Drink beer or alcoholic beverages.   Make important decisions or sign legal documents.    SPECIAL INSTRUCTIONS: Weight bear as tolerated    DIET: To avoid nausea, slowly advance diet as tolerated, avoiding spicy or greasy foods for the first day.  Add more substantial food to your diet according to your physician's instructions.  Babies can be fed formula or breast milk as soon as they are hungry.  INCREASE FLUIDS AND FIBER TO AVOID CONSTIPATION.    SURGICAL DRESSING/BATHING: May shower with the dressings covered. Keep dressing clean, dry and intact until instructed otherwise by surgeon.    FOLLOW-UP APPOINTMENT:  A follow-up appointment should be arranged with your doctor; call to schedule.    You should CALL YOUR PHYSICIAN if you develop:  Fever greater than 101 degrees F.  Pain not relieved by medication, or persistent nausea or vomiting.  Excessive bleeding (blood soaking through dressing) or unexpected drainage from the wound.  Extreme redness or swelling around the incision site, drainage of pus or foul smelling drainage.  Inability to urinate or empty your bladder within 8 hours.  Problems with breathing or chest pain.    You should call 911 if you develop problems with breathing or chest pain.  If you are unable to contact your doctor or surgical center, you should go to the nearest emergency room or urgent care center.  Physician's telephone #: [phone]    If any questions arise, call your doctor.  If your doctor is not available, please feel free to  call the Surgical Center at [phone].     A registered nurse may call you a few days after your surgery to see how you are doing after your procedure.    MEDICATIONS: Resume taking daily medication.  Take prescribed pain medication with food.  If no medication is prescribed, you may take non-aspirin pain medication if needed.  PAIN MEDICATION CAN BE VERY CONSTIPATING.  Take a stool softener or laxative such as senokot, pericolace, or milk of magnesia if needed.    No oral pain medication given in the recovery room    If your physician has prescribed pain medication that includes Acetaminophen (Tylenol), do not take additional Acetaminophen (Tylenol) while taking the prescribed medication.    [facility] [phone]  [address], [place] [address]  Appointment 8/23 at 3 pm   Bring printed copy of procedure note

## 2022-08-22 NOTE — ANESTHESIA POSTPROCEDURE EVALUATION
Patient: Annamaria Babin    Procedure Summary     Date: 08/22/22 Room / Location:  OR  / SURGERY [facility]    Anesthesia Start: 1149 Anesthesia Stop: 1245    Procedure: IRRIGATION AND DEBRIDEMENT,RIGHT ANKLE WOUND (Right: Ankle) Diagnosis: (WOUND DEHISCENCE)    Surgeons: Vernon Gaspar M.D. Responsible Provider: Balaji Baez D.O.    Anesthesia Type: general ASA Status: 2          Final Anesthesia Type: general  Last vitals  BP   Blood Pressure: 111/72    Temp   36.3 °C (97.3 °F)    Pulse   72   Resp   16    SpO2   97 %      Anesthesia Post Evaluation    Patient location during evaluation: PACU  Patient participation: complete - patient participated  Level of consciousness: awake and alert  Pain score: 4    Airway patency: patent  Anesthetic complications: no  Cardiovascular status: hemodynamically stable  Respiratory status: acceptable  Hydration status: euvolemic    PONV: none          No notable events documented.     Nurse Pain Score: 6 (NPRS)

## 2022-08-22 NOTE — OR NURSING
1244: Pt arrived to PACU. Jaw thrust used to assist with airway. VSS. Dressing clean, dry and intact.      1250: respirations even and unlabored, no longer requires jaw thrust assistance    1259: opens eyes spontaneously, respirations even and unlabored, able to move all extremities, answering questions appropriately, denies pain/nausea    1314: respirations even and unlabored, denies pain/nausea, tolerating sips of water and juice    1315: Report given to Katalina VACA

## 2022-08-22 NOTE — OP REPORT
DATE OF OPERATION: 8/22/2022     SURGEON: Vernon Gaspar M.D.    ANESTHESIOLOGIST: Anesthesiologist: Balaji Baez D.O.    ANESTHESIA: General    SURGICAL FIRST ASST: none    PREOPERATIVE DIAGNOSIS: Right ankle wound dehiscence    POSTOPERATIVE DIAGNOSIS: Right ankle wound dehiscence    PROCEDURE: Right ankle irrigation and excisional debridement down to muscle and fascia, incision length 5 cm     INDICATIONS: Patient is a 37-year-old female who recently underwent exostectomy of the distal fibula.  She had some wound separation a couple weeks after surgery and then had some drainage from her wound that was improved with oral antibiotics.  However after going off of the oral antibiotics her wound did open up and drain again thus prompting surgical management for irrigation and debridement with revision of the wound.  She understood the risk and benefits prior to proceeding.     DETAILS OF PROCEDURE:   The patient was met in the preoperative holding area and the right side was marked as the correct operative side.  Risk-benefit discussion was had and consent was signed.  She was brought to the operating room where timeout confirmed patient, laterality, and procedure.  Anesthesia was induced and an airway was established.  Antibiotics were held in order to obtain cultures.  Tourniquet was placed on the thigh.  The limb was prepped and draped in the normal sterile surgical fashion.  The tourniquet was inflated and the surgery was begun.     We marked out over the prior incision and then marked and ellipse over the area that was draining.  We sharply dissected through the skin and then ellipsed out the draining sinus tract.  Some of this tissue was sent for culture.  We then immediately came down onto the peroneal tendons.  There was some fibrinous appearing scar tissue overlying the peroneal tendons at this area where the draining sinus was and this was sent for culture as well.  The peroneal sheath was opened  proximally and distally and the peroneal tendons were inspected and were without tear or abnormality.  There was really no gross purulence or any nonviable tissue in the wound.  There was no tracking down to bone or any exposed or questionable appearing bone tissue. Some of the fibrinous tissue overlying the peroneal tendon sheath was debrided sharply with a knife.  We then irrigated well with 3 L of normal saline.  The peroneal sheath was closed with 3-0 Monocryl and then 3-0 Monocryl was used for the subcutaneous layer followed by interrupted 3-0 nylon sutures.  A soft dressing was placed and the patient was placed and the patient was placed into a postoperative boot and was awoken from anesthesia brought to the postoperative care unit without complication.     COMPLICATIONS:none    POST OP PLAN:   I discussed her case directly with the infectious disease doctors and she will follow up with them tomorrow at 3 PM in their clinic for discussion of antibiotics  Okay to weight-bear as tolerated in the boot  Keep dressing clean and dry  Follow-up in my clinic in 1 week for wound check  ____________________________________   Vernon Gaspar M.D.

## 2022-08-24 ENCOUNTER — HOSPITAL ENCOUNTER (OUTPATIENT)
Dept: LAB | Facility: MEDICAL CENTER | Age: 37
End: 2022-08-24
Attending: INTERNAL MEDICINE
Payer: MEDICARE

## 2022-08-24 LAB
ALBUMIN SERPL BCP-MCNC: 4.7 G/DL (ref 3.2–4.9)
ALBUMIN/GLOB SERPL: 1.9 G/DL
ALP SERPL-CCNC: 87 U/L (ref 30–99)
ALT SERPL-CCNC: 11 U/L (ref 2–50)
ANION GAP SERPL CALC-SCNC: 10 MMOL/L (ref 7–16)
ANISOCYTOSIS BLD QL SMEAR: ABNORMAL
AST SERPL-CCNC: 18 U/L (ref 12–45)
BASOPHILS # BLD AUTO: 0.7 % (ref 0–1.8)
BASOPHILS # BLD: 0.04 K/UL (ref 0–0.12)
BILIRUB SERPL-MCNC: 0.2 MG/DL (ref 0.1–1.5)
BUN SERPL-MCNC: 12 MG/DL (ref 8–22)
CALCIUM SERPL-MCNC: 8.9 MG/DL (ref 8.5–10.5)
CHLORIDE SERPL-SCNC: 111 MMOL/L (ref 96–112)
CK SERPL-CCNC: 29 U/L (ref 0–154)
CO2 SERPL-SCNC: 23 MMOL/L (ref 20–33)
COMMENT 1642: NORMAL
CREAT SERPL-MCNC: 0.83 MG/DL (ref 0.5–1.4)
CRP SERPL HS-MCNC: <0.3 MG/DL (ref 0–0.75)
EOSINOPHIL # BLD AUTO: 0.09 K/UL (ref 0–0.51)
EOSINOPHIL NFR BLD: 1.5 % (ref 0–6.9)
ERYTHROCYTE [DISTWIDTH] IN BLOOD BY AUTOMATED COUNT: 42.8 FL (ref 35.9–50)
ERYTHROCYTE [SEDIMENTATION RATE] IN BLOOD BY WESTERGREN METHOD: 8 MM/HOUR (ref 0–25)
FASTING STATUS PATIENT QL REPORTED: NORMAL
GFR SERPLBLD CREATININE-BSD FMLA CKD-EPI: 92 ML/MIN/1.73 M 2
GLOBULIN SER CALC-MCNC: 2.5 G/DL (ref 1.9–3.5)
GLUCOSE SERPL-MCNC: 80 MG/DL (ref 65–99)
HCT VFR BLD AUTO: 38.4 % (ref 37–47)
HGB BLD-MCNC: 11 G/DL (ref 12–16)
HYPOCHROMIA BLD QL SMEAR: ABNORMAL
IMM GRANULOCYTES # BLD AUTO: 0.01 K/UL (ref 0–0.11)
IMM GRANULOCYTES NFR BLD AUTO: 0.2 % (ref 0–0.9)
LYMPHOCYTES # BLD AUTO: 2.87 K/UL (ref 1–4.8)
LYMPHOCYTES NFR BLD: 46.7 % (ref 22–41)
MCH RBC QN AUTO: 21.5 PG (ref 27–33)
MCHC RBC AUTO-ENTMCNC: 28.6 G/DL (ref 33.6–35)
MCV RBC AUTO: 75.1 FL (ref 81.4–97.8)
MICROCYTES BLD QL SMEAR: ABNORMAL
MONOCYTES # BLD AUTO: 0.44 K/UL (ref 0–0.85)
MONOCYTES NFR BLD AUTO: 7.2 % (ref 0–13.4)
MORPHOLOGY BLD-IMP: NORMAL
NEUTROPHILS # BLD AUTO: 2.69 K/UL (ref 2–7.15)
NEUTROPHILS NFR BLD: 43.7 % (ref 44–72)
NRBC # BLD AUTO: 0 K/UL
NRBC BLD-RTO: 0 /100 WBC
OVALOCYTES BLD QL SMEAR: NORMAL
PLATELET # BLD AUTO: 312 K/UL (ref 164–446)
PLATELET BLD QL SMEAR: NORMAL
PMV BLD AUTO: 10.7 FL (ref 9–12.9)
POIKILOCYTOSIS BLD QL SMEAR: NORMAL
POLYCHROMASIA BLD QL SMEAR: NORMAL
POTASSIUM SERPL-SCNC: 4.2 MMOL/L (ref 3.6–5.5)
PROT SERPL-MCNC: 7.2 G/DL (ref 6–8.2)
RBC # BLD AUTO: 5.11 M/UL (ref 4.2–5.4)
RBC BLD AUTO: PRESENT
SODIUM SERPL-SCNC: 144 MMOL/L (ref 135–145)
WBC # BLD AUTO: 6.1 K/UL (ref 4.8–10.8)

## 2022-08-24 PROCEDURE — 36415 COLL VENOUS BLD VENIPUNCTURE: CPT

## 2022-08-24 PROCEDURE — 85025 COMPLETE CBC W/AUTO DIFF WBC: CPT

## 2022-08-24 PROCEDURE — 82550 ASSAY OF CK (CPK): CPT

## 2022-08-24 PROCEDURE — 86140 C-REACTIVE PROTEIN: CPT

## 2022-08-24 PROCEDURE — 80053 COMPREHEN METABOLIC PANEL: CPT

## 2022-08-24 PROCEDURE — 85652 RBC SED RATE AUTOMATED: CPT

## 2022-08-25 ENCOUNTER — HOSPITAL ENCOUNTER (OUTPATIENT)
Dept: RADIOLOGY | Facility: MEDICAL CENTER | Age: 37
End: 2022-08-25
Attending: INTERNAL MEDICINE
Payer: MEDICARE

## 2022-08-25 DIAGNOSIS — Z79.2 ENCOUNTER FOR LONG-TERM (CURRENT) USE OF ANTIBIOTICS: ICD-10-CM

## 2022-08-25 LAB
BACTERIA TISS AEROBE CULT: NORMAL
BACTERIA TISS AEROBE CULT: NORMAL
BACTERIA WND AEROBE CULT: NORMAL
GRAM STN SPEC: NORMAL
SIGNIFICANT IND 70042: NORMAL
SITE SITE: NORMAL
SOURCE SOURCE: NORMAL

## 2022-08-25 PROCEDURE — 36573 INSJ PICC RS&I 5 YR+: CPT

## 2022-08-25 NOTE — PROGRESS NOTES
"  PICC Line Instructions    How to Care for your PICC  Do not take a bath, swim, or use hot tubs when you have a PICC. Cover PICC line with clear plastic wrap and tape to keep it dry while showering.  Check the PICC insertion site daily for leakage, redness, swelling, or pain.  Flush the PICC as directed by your health care provider. Let your health care provider know right away if the PICC is difficult to flush or does not flush. Do not use force to flush the PICC.  Do not use a syringe that is less than 10 mL to flush the PICC.  Avoid blood pressure checks on the arm with the PICC.  Do not take the PICC out yourself. Only a trained clinical professional should remove the PICC.  Make sure you or anyone who access your PICC Line washes their hands before using the line.  Make sure the hub of the line is \"scrubbed\" prior to using the line.    Dressing Changes  Change the PICC dressing as instructed by your health care provider.  Change your PICC dressing if it becomes loose or wet.    When to seek medical attention  PICC is accidentally pulled all the way out.  There is any type of drainage, redness, or swelling where the PICC enters the skin.  Noticeable increase in arm circumference due to swelling of arm.  You cannot flush the PICC, it is difficult to flush, or the PICC leaks around the insertion site when it is flushed.  You hear a \"flushing\" sound when the PICC is flushed.  You notice a hole or tear in the PICC.  You develop chills or a fever.   "

## 2022-08-26 NOTE — PROCEDURES
Vascular Access Team      Date of Insertion: 8/25/22  Arm Circumference: 31  Internal length: 52                       External Length: 3  Vein Occupancy %:27              Reason for PICC: abx  Labs: Within procedure parameters.     Consents confirmed, vessel patency confirmed with ultrasound. Risks and benefits of procedure explained to patient and education regarding central line associated bloodstream infections provided. Questions answered.      PICC placed in left brachial vein per licensed provider order with ultrasound guidance. 1 mL of 1% lidocaine injected intradermally at the insertion site. A 21 gauge microintroducer needle was visualized entering the vein and modified Seldinger technique was used to obtain access to the vein.   4 Fr, single lumen PICC placed in left brachial vein after 2 attempt(s); first attempt in left basilic vein unable to cross wire. 52 cm catheter inserted and brisk blood return was observed from each lumen upon aspiration. TCS stylet removed and observed to be fully intact. Each lumen flushed using pulsatile method with 10 mL 0.9% normal saline without resistance. PICC line secured at 3 cm chava with StatLock. Biopatch and Tegaderm applied.     PICC tip placement location is confirmed by nurse to be in appropriate position by utilizing 3CG technology. PICC line is appropriate for use at this time. Patient tolerated procedure well, without complications. Patient condition relayed to primary RN or ordering physician via this post procedure note in the EMR.      Ultrasound images uploaded to PACS and viewable in the EMR.     "[employer] Power PICC ref # [id number], Lot #  [id number], Expiration Date: 9/30/23

## 2022-08-27 LAB
BACTERIA SPEC ANAEROBE CULT: NORMAL
SIGNIFICANT IND 70042: NORMAL
SITE SITE: NORMAL
SOURCE SOURCE: NORMAL

## 2022-08-28 ENCOUNTER — OUTPATIENT INFUSION SERVICES (OUTPATIENT)
Dept: ONCOLOGY | Facility: MEDICAL CENTER | Age: 37
End: 2022-08-28
Attending: INTERNAL MEDICINE
Payer: MEDICARE

## 2022-08-28 VITALS
DIASTOLIC BLOOD PRESSURE: 74 MMHG | TEMPERATURE: 98 F | HEIGHT: 65 IN | OXYGEN SATURATION: 98 % | RESPIRATION RATE: 18 BRPM | HEART RATE: 67 BPM | SYSTOLIC BLOOD PRESSURE: 115 MMHG | WEIGHT: 189.6 LBS | BODY MASS INDEX: 31.59 KG/M2

## 2022-08-28 DIAGNOSIS — D50.0 IRON DEFICIENCY ANEMIA DUE TO CHRONIC BLOOD LOSS: ICD-10-CM

## 2022-08-28 PROCEDURE — 700111 HCHG RX REV CODE 636 W/ 250 OVERRIDE (IP): Performed by: INTERNAL MEDICINE

## 2022-08-28 PROCEDURE — 700105 HCHG RX REV CODE 258: Performed by: INTERNAL MEDICINE

## 2022-08-28 PROCEDURE — 96365 THER/PROPH/DIAG IV INF INIT: CPT

## 2022-08-28 RX ORDER — 0.9 % SODIUM CHLORIDE 0.9 %
10 VIAL (ML) INJECTION PRN
Status: CANCELLED | OUTPATIENT
Start: 2022-08-28

## 2022-08-28 RX ORDER — 0.9 % SODIUM CHLORIDE 0.9 %
3 VIAL (ML) INJECTION PRN
Status: CANCELLED | OUTPATIENT
Start: 2022-08-28

## 2022-08-28 RX ORDER — METHYLPREDNISOLONE SODIUM SUCCINATE 125 MG/2ML
125 INJECTION, POWDER, LYOPHILIZED, FOR SOLUTION INTRAMUSCULAR; INTRAVENOUS PRN
Status: CANCELLED | OUTPATIENT
Start: 2022-08-28

## 2022-08-28 RX ORDER — ALBUTEROL SULFATE 90 UG/1
2 AEROSOL, METERED RESPIRATORY (INHALATION) PRN
Status: CANCELLED | OUTPATIENT
Start: 2022-08-28

## 2022-08-28 RX ORDER — DIPHENHYDRAMINE HYDROCHLORIDE 50 MG/ML
50 INJECTION INTRAMUSCULAR; INTRAVENOUS ONCE
Status: CANCELLED | OUTPATIENT
Start: 2022-08-28 | End: 2022-08-28

## 2022-08-28 RX ORDER — 0.9 % SODIUM CHLORIDE 0.9 %
VIAL (ML) INJECTION PRN
Status: CANCELLED | OUTPATIENT
Start: 2022-08-28

## 2022-08-28 RX ORDER — HEPARIN SODIUM (PORCINE) LOCK FLUSH IV SOLN 100 UNIT/ML 100 UNIT/ML
500 SOLUTION INTRAVENOUS PRN
Status: CANCELLED | OUTPATIENT
Start: 2022-08-28

## 2022-08-28 RX ORDER — EPINEPHRINE 1 MG/ML(1)
0.5 AMPUL (ML) INJECTION PRN
Status: CANCELLED | OUTPATIENT
Start: 2022-08-28

## 2022-08-28 RX ORDER — SODIUM CHLORIDE 9 MG/ML
INJECTION, SOLUTION INTRAVENOUS CONTINUOUS
Status: CANCELLED | OUTPATIENT
Start: 2022-08-28

## 2022-08-28 RX ADMIN — FERUMOXYTOL 510 MG: 510 INJECTION INTRAVENOUS at 07:28

## 2022-08-28 ASSESSMENT — FIBROSIS 4 INDEX: FIB4 SCORE: 0.66

## 2022-08-28 NOTE — PROGRESS NOTES
Ms Babin is here today for Feraheme infusion. She has been treated since June 2022 for bone spur surgery and revision due to infection. Currently being treated with antibiotic therapy with infectious disease MD, she does not recall the name of antibiotic. Has a PICC line to left brachial, flushed well with blood return. Site is clean dry and intact.    Feraheme infused over thirty minutes. Tolerated well. Flushed PICC line, place protective sleeve. Discharged home in stable condition.

## 2022-08-31 ENCOUNTER — HOSPITAL ENCOUNTER (OUTPATIENT)
Facility: MEDICAL CENTER | Age: 37
End: 2022-08-31
Attending: INTERNAL MEDICINE
Payer: MEDICARE

## 2022-08-31 LAB
ALBUMIN SERPL BCP-MCNC: 4.3 G/DL (ref 3.2–4.9)
ALBUMIN/GLOB SERPL: 1.8 G/DL
ALP SERPL-CCNC: 96 U/L (ref 30–99)
ALT SERPL-CCNC: 12 U/L (ref 2–50)
ANION GAP SERPL CALC-SCNC: 13 MMOL/L (ref 7–16)
AST SERPL-CCNC: 15 U/L (ref 12–45)
BILIRUB SERPL-MCNC: 0.2 MG/DL (ref 0.1–1.5)
BUN SERPL-MCNC: 12 MG/DL (ref 8–22)
CALCIUM SERPL-MCNC: 9.1 MG/DL (ref 8.5–10.5)
CHLORIDE SERPL-SCNC: 108 MMOL/L (ref 96–112)
CK SERPL-CCNC: 30 U/L (ref 0–154)
CO2 SERPL-SCNC: 20 MMOL/L (ref 20–33)
CREAT SERPL-MCNC: 0.64 MG/DL (ref 0.5–1.4)
CRP SERPL HS-MCNC: <0.3 MG/DL (ref 0–0.75)
GFR SERPLBLD CREATININE-BSD FMLA CKD-EPI: 116 ML/MIN/1.73 M 2
GLOBULIN SER CALC-MCNC: 2.4 G/DL (ref 1.9–3.5)
GLUCOSE SERPL-MCNC: 97 MG/DL (ref 65–99)
POTASSIUM SERPL-SCNC: 4.5 MMOL/L (ref 3.6–5.5)
PROT SERPL-MCNC: 6.7 G/DL (ref 6–8.2)
SODIUM SERPL-SCNC: 141 MMOL/L (ref 135–145)

## 2022-08-31 PROCEDURE — 82550 ASSAY OF CK (CPK): CPT

## 2022-08-31 PROCEDURE — 86140 C-REACTIVE PROTEIN: CPT

## 2022-08-31 PROCEDURE — 85652 RBC SED RATE AUTOMATED: CPT

## 2022-08-31 PROCEDURE — 80053 COMPREHEN METABOLIC PANEL: CPT

## 2022-08-31 PROCEDURE — 36415 COLL VENOUS BLD VENIPUNCTURE: CPT

## 2022-08-31 PROCEDURE — 85025 COMPLETE CBC W/AUTO DIFF WBC: CPT

## 2022-09-01 LAB
ANISOCYTOSIS BLD QL SMEAR: ABNORMAL
BASOPHILS # BLD AUTO: 0.5 % (ref 0–1.8)
BASOPHILS # BLD: 0.03 K/UL (ref 0–0.12)
BURR CELLS BLD QL SMEAR: NORMAL
COMMENT 1642: NORMAL
EOSINOPHIL # BLD AUTO: 0.1 K/UL (ref 0–0.51)
EOSINOPHIL NFR BLD: 1.8 % (ref 0–6.9)
ERYTHROCYTE [DISTWIDTH] IN BLOOD BY AUTOMATED COUNT: 56.2 FL (ref 35.9–50)
ERYTHROCYTE [SEDIMENTATION RATE] IN BLOOD BY WESTERGREN METHOD: 6 MM/HOUR (ref 0–25)
HCT VFR BLD AUTO: 42.2 % (ref 37–47)
HGB BLD-MCNC: 12.3 G/DL (ref 12–16)
IMM GRANULOCYTES # BLD AUTO: 0.02 K/UL (ref 0–0.11)
IMM GRANULOCYTES NFR BLD AUTO: 0.4 % (ref 0–0.9)
LYMPHOCYTES # BLD AUTO: 1.74 K/UL (ref 1–4.8)
LYMPHOCYTES NFR BLD: 31.9 % (ref 22–41)
MCH RBC QN AUTO: 23 PG (ref 27–33)
MCHC RBC AUTO-ENTMCNC: 29.1 G/DL (ref 33.6–35)
MCV RBC AUTO: 78.9 FL (ref 81.4–97.8)
MICROCYTES BLD QL SMEAR: ABNORMAL
MONOCYTES # BLD AUTO: 0.45 K/UL (ref 0–0.85)
MONOCYTES NFR BLD AUTO: 8.2 % (ref 0–13.4)
MORPHOLOGY BLD-IMP: NORMAL
NEUTROPHILS # BLD AUTO: 3.12 K/UL (ref 2–7.15)
NEUTROPHILS NFR BLD: 57.2 % (ref 44–72)
NRBC # BLD AUTO: 0 K/UL
NRBC BLD-RTO: 0 /100 WBC
OVALOCYTES BLD QL SMEAR: NORMAL
PLATELET # BLD AUTO: 215 K/UL (ref 164–446)
PLATELET BLD QL SMEAR: NORMAL
PMV BLD AUTO: 12 FL (ref 9–12.9)
POIKILOCYTOSIS BLD QL SMEAR: NORMAL
RBC # BLD AUTO: 5.35 M/UL (ref 4.2–5.4)
RBC BLD AUTO: PRESENT
SCHISTOCYTES BLD QL SMEAR: NORMAL
WBC # BLD AUTO: 5.5 K/UL (ref 4.8–10.8)

## 2022-09-04 DIAGNOSIS — F33.2 SEVERE EPISODE OF RECURRENT MAJOR DEPRESSIVE DISORDER, WITHOUT PSYCHOTIC FEATURES (HCC): ICD-10-CM

## 2022-09-07 RX ORDER — FLUOXETINE 10 MG/1
10 CAPSULE ORAL DAILY
Qty: 30 CAPSULE | Refills: 1 | Status: SHIPPED | OUTPATIENT
Start: 2022-09-07 | End: 2022-10-07

## 2022-09-08 ENCOUNTER — HOSPITAL ENCOUNTER (OUTPATIENT)
Dept: RADIOLOGY | Facility: MEDICAL CENTER | Age: 37
End: 2022-09-08
Attending: NURSE PRACTITIONER
Payer: MEDICARE

## 2022-09-08 ENCOUNTER — HOSPITAL ENCOUNTER (OUTPATIENT)
Facility: MEDICAL CENTER | Age: 37
End: 2022-09-08
Attending: INTERNAL MEDICINE
Payer: MEDICARE

## 2022-09-08 DIAGNOSIS — I82.622 DEEP VEIN THROMBOSIS (DVT) OF LEFT UPPER EXTREMITY, UNSPECIFIED CHRONICITY, UNSPECIFIED VEIN (HCC): ICD-10-CM

## 2022-09-08 DIAGNOSIS — M79.602 LEFT ARM PAIN: ICD-10-CM

## 2022-09-08 LAB
ALBUMIN SERPL BCP-MCNC: 4.5 G/DL (ref 3.2–4.9)
ALBUMIN/GLOB SERPL: 2.1 G/DL
ALP SERPL-CCNC: 93 U/L (ref 30–99)
ALT SERPL-CCNC: 12 U/L (ref 2–50)
ANION GAP SERPL CALC-SCNC: 12 MMOL/L (ref 7–16)
AST SERPL-CCNC: 14 U/L (ref 12–45)
BASOPHILS # BLD AUTO: 0.5 % (ref 0–1.8)
BASOPHILS # BLD: 0.03 K/UL (ref 0–0.12)
BILIRUB SERPL-MCNC: 0.2 MG/DL (ref 0.1–1.5)
BUN SERPL-MCNC: 12 MG/DL (ref 8–22)
CALCIUM SERPL-MCNC: 8.8 MG/DL (ref 8.5–10.5)
CHLORIDE SERPL-SCNC: 108 MMOL/L (ref 96–112)
CK SERPL-CCNC: 58 U/L (ref 0–154)
CO2 SERPL-SCNC: 20 MMOL/L (ref 20–33)
CREAT SERPL-MCNC: 0.71 MG/DL (ref 0.5–1.4)
CRP SERPL HS-MCNC: <0.3 MG/DL (ref 0–0.75)
EOSINOPHIL # BLD AUTO: 0.13 K/UL (ref 0–0.51)
EOSINOPHIL NFR BLD: 2.3 % (ref 0–6.9)
ERYTHROCYTE [DISTWIDTH] IN BLOOD BY AUTOMATED COUNT: 61.1 FL (ref 35.9–50)
GFR SERPLBLD CREATININE-BSD FMLA CKD-EPI: 111 ML/MIN/1.73 M 2
GLOBULIN SER CALC-MCNC: 2.1 G/DL (ref 1.9–3.5)
GLUCOSE SERPL-MCNC: 114 MG/DL (ref 65–99)
HCT VFR BLD AUTO: 43.5 % (ref 37–47)
HGB BLD-MCNC: 13.2 G/DL (ref 12–16)
IMM GRANULOCYTES # BLD AUTO: 0.02 K/UL (ref 0–0.11)
IMM GRANULOCYTES NFR BLD AUTO: 0.4 % (ref 0–0.9)
LYMPHOCYTES # BLD AUTO: 1.88 K/UL (ref 1–4.8)
LYMPHOCYTES NFR BLD: 33.5 % (ref 22–41)
MCH RBC QN AUTO: 24.1 PG (ref 27–33)
MCHC RBC AUTO-ENTMCNC: 30.3 G/DL (ref 33.6–35)
MCV RBC AUTO: 79.5 FL (ref 81.4–97.8)
MONOCYTES # BLD AUTO: 0.38 K/UL (ref 0–0.85)
MONOCYTES NFR BLD AUTO: 6.8 % (ref 0–13.4)
NEUTROPHILS # BLD AUTO: 3.18 K/UL (ref 2–7.15)
NEUTROPHILS NFR BLD: 56.5 % (ref 44–72)
NRBC # BLD AUTO: 0 K/UL
NRBC BLD-RTO: 0 /100 WBC
PLATELET # BLD AUTO: 291 K/UL (ref 164–446)
PMV BLD AUTO: 11.2 FL (ref 9–12.9)
POTASSIUM SERPL-SCNC: 4.4 MMOL/L (ref 3.6–5.5)
PROT SERPL-MCNC: 6.6 G/DL (ref 6–8.2)
RBC # BLD AUTO: 5.47 M/UL (ref 4.2–5.4)
SODIUM SERPL-SCNC: 140 MMOL/L (ref 135–145)
WBC # BLD AUTO: 5.6 K/UL (ref 4.8–10.8)

## 2022-09-08 PROCEDURE — 82550 ASSAY OF CK (CPK): CPT

## 2022-09-08 PROCEDURE — 80053 COMPREHEN METABOLIC PANEL: CPT

## 2022-09-08 PROCEDURE — 85025 COMPLETE CBC W/AUTO DIFF WBC: CPT

## 2022-09-08 PROCEDURE — 93971 EXTREMITY STUDY: CPT | Mod: LT

## 2022-09-08 PROCEDURE — 85652 RBC SED RATE AUTOMATED: CPT

## 2022-09-08 PROCEDURE — 86140 C-REACTIVE PROTEIN: CPT

## 2022-09-09 ENCOUNTER — HOSPITAL ENCOUNTER (OUTPATIENT)
Dept: RADIOLOGY | Facility: MEDICAL CENTER | Age: 37
End: 2022-09-09
Attending: NURSE PRACTITIONER
Payer: MEDICARE

## 2022-09-09 DIAGNOSIS — A15.9 RESPIRATORY TUBERCULOSIS: ICD-10-CM

## 2022-09-09 DIAGNOSIS — M79.602 LEFT UPPER LIMB PAIN: ICD-10-CM

## 2022-09-09 DIAGNOSIS — R07.9 CHEST PAIN, UNSPECIFIED TYPE: ICD-10-CM

## 2022-09-09 DIAGNOSIS — J82.89 PULMONARY EOSINOPHILIA (HCC): ICD-10-CM

## 2022-09-09 DIAGNOSIS — R06.02 SOB (SHORTNESS OF BREATH) ON EXERTION: ICD-10-CM

## 2022-09-09 DIAGNOSIS — R05.9 COUGH: ICD-10-CM

## 2022-09-09 DIAGNOSIS — J18.9 PNEUMONIA DUE TO INFECTIOUS ORGANISM, UNSPECIFIED LATERALITY, UNSPECIFIED PART OF LUNG: ICD-10-CM

## 2022-09-09 LAB — ERYTHROCYTE [SEDIMENTATION RATE] IN BLOOD BY WESTERGREN METHOD: 4 MM/HOUR (ref 0–25)

## 2022-09-09 PROCEDURE — 71046 X-RAY EXAM CHEST 2 VIEWS: CPT

## 2022-09-14 ENCOUNTER — HOSPITAL ENCOUNTER (OUTPATIENT)
Dept: RADIOLOGY | Facility: MEDICAL CENTER | Age: 37
End: 2022-09-14
Attending: INTERNAL MEDICINE
Payer: MEDICARE

## 2022-09-14 DIAGNOSIS — N83.201 RIGHT OVARIAN CYST: ICD-10-CM

## 2022-09-14 DIAGNOSIS — N92.1 MENORRHAGIA WITH IRREGULAR CYCLE: ICD-10-CM

## 2022-09-14 DIAGNOSIS — B37.31 VAGINAL CANDIDA: ICD-10-CM

## 2022-09-14 PROCEDURE — 76830 TRANSVAGINAL US NON-OB: CPT

## 2022-09-14 RX ORDER — FLUCONAZOLE 150 MG/1
150 TABLET ORAL DAILY
Qty: 1 TABLET | Refills: 0 | Status: SHIPPED | OUTPATIENT
Start: 2022-09-14 | End: 2023-05-11

## 2022-09-14 RX ORDER — FLUCONAZOLE 150 MG/1
150 TABLET ORAL DAILY
Qty: 1 TABLET | Refills: 0 | Status: CANCELLED | OUTPATIENT
Start: 2022-09-14

## 2022-09-19 ENCOUNTER — HOSPITAL ENCOUNTER (OUTPATIENT)
Facility: MEDICAL CENTER | Age: 37
End: 2022-09-19
Attending: STUDENT IN AN ORGANIZED HEALTH CARE EDUCATION/TRAINING PROGRAM
Payer: MEDICARE

## 2022-09-19 LAB
ALBUMIN SERPL BCP-MCNC: 4.7 G/DL (ref 3.2–4.9)
ALBUMIN/GLOB SERPL: 2 G/DL
ALP SERPL-CCNC: 92 U/L (ref 30–99)
ALT SERPL-CCNC: 15 U/L (ref 2–50)
ANION GAP SERPL CALC-SCNC: 14 MMOL/L (ref 7–16)
AST SERPL-CCNC: 14 U/L (ref 12–45)
BASOPHILS # BLD AUTO: 0.6 % (ref 0–1.8)
BASOPHILS # BLD: 0.03 K/UL (ref 0–0.12)
BILIRUB SERPL-MCNC: 0.6 MG/DL (ref 0.1–1.5)
BUN SERPL-MCNC: 14 MG/DL (ref 8–22)
CALCIUM SERPL-MCNC: 9.1 MG/DL (ref 8.5–10.5)
CHLORIDE SERPL-SCNC: 106 MMOL/L (ref 96–112)
CK SERPL-CCNC: 39 U/L (ref 0–154)
CO2 SERPL-SCNC: 21 MMOL/L (ref 20–33)
CREAT SERPL-MCNC: 0.81 MG/DL (ref 0.5–1.4)
CRP SERPL HS-MCNC: <0.3 MG/DL (ref 0–0.75)
EOSINOPHIL # BLD AUTO: 0.1 K/UL (ref 0–0.51)
EOSINOPHIL NFR BLD: 2 % (ref 0–6.9)
ERYTHROCYTE [DISTWIDTH] IN BLOOD BY AUTOMATED COUNT: 61.5 FL (ref 35.9–50)
ERYTHROCYTE [SEDIMENTATION RATE] IN BLOOD BY WESTERGREN METHOD: 5 MM/HOUR (ref 0–25)
GFR SERPLBLD CREATININE-BSD FMLA CKD-EPI: 95 ML/MIN/1.73 M 2
GLOBULIN SER CALC-MCNC: 2.3 G/DL (ref 1.9–3.5)
GLUCOSE SERPL-MCNC: 94 MG/DL (ref 65–99)
HCT VFR BLD AUTO: 47.8 % (ref 37–47)
HGB BLD-MCNC: 15 G/DL (ref 12–16)
IMM GRANULOCYTES # BLD AUTO: 0.01 K/UL (ref 0–0.11)
IMM GRANULOCYTES NFR BLD AUTO: 0.2 % (ref 0–0.9)
LYMPHOCYTES # BLD AUTO: 1.65 K/UL (ref 1–4.8)
LYMPHOCYTES NFR BLD: 33.1 % (ref 22–41)
MCH RBC QN AUTO: 25.2 PG (ref 27–33)
MCHC RBC AUTO-ENTMCNC: 31.4 G/DL (ref 33.6–35)
MCV RBC AUTO: 80.3 FL (ref 81.4–97.8)
MONOCYTES # BLD AUTO: 0.34 K/UL (ref 0–0.85)
MONOCYTES NFR BLD AUTO: 6.8 % (ref 0–13.4)
NEUTROPHILS # BLD AUTO: 2.86 K/UL (ref 2–7.15)
NEUTROPHILS NFR BLD: 57.3 % (ref 44–72)
NRBC # BLD AUTO: 0 K/UL
NRBC BLD-RTO: 0 /100 WBC
PLATELET # BLD AUTO: 261 K/UL (ref 164–446)
PMV BLD AUTO: 10.3 FL (ref 9–12.9)
POTASSIUM SERPL-SCNC: 4.3 MMOL/L (ref 3.6–5.5)
PROT SERPL-MCNC: 7 G/DL (ref 6–8.2)
RBC # BLD AUTO: 5.95 M/UL (ref 4.2–5.4)
SODIUM SERPL-SCNC: 141 MMOL/L (ref 135–145)
WBC # BLD AUTO: 5 K/UL (ref 4.8–10.8)

## 2022-09-19 PROCEDURE — 86140 C-REACTIVE PROTEIN: CPT

## 2022-09-19 PROCEDURE — 80053 COMPREHEN METABOLIC PANEL: CPT

## 2022-09-19 PROCEDURE — 82550 ASSAY OF CK (CPK): CPT

## 2022-09-19 PROCEDURE — 85652 RBC SED RATE AUTOMATED: CPT

## 2022-09-19 PROCEDURE — 85025 COMPLETE CBC W/AUTO DIFF WBC: CPT

## 2022-09-21 DIAGNOSIS — D50.8 OTHER IRON DEFICIENCY ANEMIA: ICD-10-CM

## 2022-09-21 DIAGNOSIS — D47.09 MAST CELL DISEASE: ICD-10-CM

## 2022-09-23 RX ORDER — LORATADINE 10 MG/1
10 TABLET ORAL
Qty: 90 TABLET | Refills: 1 | Status: SHIPPED | OUTPATIENT
Start: 2022-09-23 | End: 2023-05-11

## 2022-09-23 RX ORDER — FERROUS GLUCONATE 324(38)MG
TABLET ORAL
Qty: 90 TABLET | Refills: 1 | Status: SHIPPED | OUTPATIENT
Start: 2022-09-23

## 2022-09-26 ENCOUNTER — HOSPITAL ENCOUNTER (OUTPATIENT)
Facility: MEDICAL CENTER | Age: 37
End: 2022-09-26
Attending: INTERNAL MEDICINE
Payer: MEDICARE

## 2022-09-26 LAB — AMBIGUOUS DTTM AMBI4: NORMAL

## 2022-09-26 PROCEDURE — 86140 C-REACTIVE PROTEIN: CPT

## 2022-09-26 PROCEDURE — 80053 COMPREHEN METABOLIC PANEL: CPT

## 2022-09-26 PROCEDURE — 82550 ASSAY OF CK (CPK): CPT

## 2022-09-26 PROCEDURE — 85025 COMPLETE CBC W/AUTO DIFF WBC: CPT

## 2022-09-26 PROCEDURE — 85652 RBC SED RATE AUTOMATED: CPT

## 2022-09-27 LAB
ALBUMIN SERPL BCP-MCNC: 4.3 G/DL (ref 3.2–4.9)
ALBUMIN/GLOB SERPL: 2 G/DL
ALP SERPL-CCNC: 101 U/L (ref 30–99)
ALT SERPL-CCNC: 27 U/L (ref 2–50)
ANION GAP SERPL CALC-SCNC: 9 MMOL/L (ref 7–16)
AST SERPL-CCNC: 21 U/L (ref 12–45)
BASOPHILS # BLD AUTO: 0.6 % (ref 0–1.8)
BASOPHILS # BLD: 0.03 K/UL (ref 0–0.12)
BILIRUB SERPL-MCNC: 0.3 MG/DL (ref 0.1–1.5)
BUN SERPL-MCNC: 9 MG/DL (ref 8–22)
CALCIUM SERPL-MCNC: 8.9 MG/DL (ref 8.5–10.5)
CHLORIDE SERPL-SCNC: 107 MMOL/L (ref 96–112)
CK SERPL-CCNC: 41 U/L (ref 0–154)
CO2 SERPL-SCNC: 24 MMOL/L (ref 20–33)
COMMENT 1642: NORMAL
CREAT SERPL-MCNC: 0.75 MG/DL (ref 0.5–1.4)
CRP SERPL HS-MCNC: <0.3 MG/DL (ref 0–0.75)
EOSINOPHIL # BLD AUTO: 0.12 K/UL (ref 0–0.51)
EOSINOPHIL NFR BLD: 2.4 % (ref 0–6.9)
ERYTHROCYTE [DISTWIDTH] IN BLOOD BY AUTOMATED COUNT: 67.2 FL (ref 35.9–50)
ERYTHROCYTE [SEDIMENTATION RATE] IN BLOOD BY WESTERGREN METHOD: 6 MM/HOUR (ref 0–25)
GFR SERPLBLD CREATININE-BSD FMLA CKD-EPI: 104 ML/MIN/1.73 M 2
GLOBULIN SER CALC-MCNC: 2.1 G/DL (ref 1.9–3.5)
GLUCOSE SERPL-MCNC: 104 MG/DL (ref 65–99)
HCT VFR BLD AUTO: 44.9 % (ref 37–47)
HGB BLD-MCNC: 14.1 G/DL (ref 12–16)
IMM GRANULOCYTES # BLD AUTO: 0.01 K/UL (ref 0–0.11)
IMM GRANULOCYTES NFR BLD AUTO: 0.2 % (ref 0–0.9)
LYMPHOCYTES # BLD AUTO: 1.62 K/UL (ref 1–4.8)
LYMPHOCYTES NFR BLD: 33.1 % (ref 22–41)
MCH RBC QN AUTO: 26.1 PG (ref 27–33)
MCHC RBC AUTO-ENTMCNC: 31.4 G/DL (ref 33.6–35)
MCV RBC AUTO: 83 FL (ref 81.4–97.8)
MONOCYTES # BLD AUTO: 0.31 K/UL (ref 0–0.85)
MONOCYTES NFR BLD AUTO: 6.3 % (ref 0–13.4)
MORPHOLOGY BLD-IMP: NORMAL
NEUTROPHILS # BLD AUTO: 2.81 K/UL (ref 2–7.15)
NEUTROPHILS NFR BLD: 57.4 % (ref 44–72)
NRBC # BLD AUTO: 0 K/UL
NRBC BLD-RTO: 0 /100 WBC
OVALOCYTES BLD QL SMEAR: NORMAL
PLATELET # BLD AUTO: 221 K/UL (ref 164–446)
PLATELET BLD QL SMEAR: NORMAL
PMV BLD AUTO: 11 FL (ref 9–12.9)
POIKILOCYTOSIS BLD QL SMEAR: NORMAL
POTASSIUM SERPL-SCNC: 4.3 MMOL/L (ref 3.6–5.5)
PROT SERPL-MCNC: 6.4 G/DL (ref 6–8.2)
RBC # BLD AUTO: 5.41 M/UL (ref 4.2–5.4)
RBC BLD AUTO: PRESENT
SODIUM SERPL-SCNC: 140 MMOL/L (ref 135–145)
WBC # BLD AUTO: 4.9 K/UL (ref 4.8–10.8)

## 2022-10-12 ENCOUNTER — HOSPITAL ENCOUNTER (OUTPATIENT)
Dept: LAB | Facility: MEDICAL CENTER | Age: 37
End: 2022-10-12
Attending: INTERNAL MEDICINE
Payer: MEDICARE

## 2022-10-12 LAB
ALBUMIN SERPL BCP-MCNC: 4.7 G/DL (ref 3.2–4.9)
ALBUMIN/GLOB SERPL: 1.6 G/DL
ALP SERPL-CCNC: 103 U/L (ref 30–99)
ALT SERPL-CCNC: 39 U/L (ref 2–50)
ANION GAP SERPL CALC-SCNC: 13 MMOL/L (ref 7–16)
AST SERPL-CCNC: 37 U/L (ref 12–45)
BASOPHILS # BLD AUTO: 0.3 % (ref 0–1.8)
BASOPHILS # BLD: 0.02 K/UL (ref 0–0.12)
BILIRUB SERPL-MCNC: 0.7 MG/DL (ref 0.1–1.5)
BUN SERPL-MCNC: 12 MG/DL (ref 8–22)
CALCIUM SERPL-MCNC: 9.2 MG/DL (ref 8.5–10.5)
CHLORIDE SERPL-SCNC: 105 MMOL/L (ref 96–112)
CO2 SERPL-SCNC: 22 MMOL/L (ref 20–33)
CREAT SERPL-MCNC: 0.8 MG/DL (ref 0.5–1.4)
CRP SERPL HS-MCNC: <0.3 MG/DL (ref 0–0.75)
EOSINOPHIL # BLD AUTO: 0.07 K/UL (ref 0–0.51)
EOSINOPHIL NFR BLD: 1 % (ref 0–6.9)
ERYTHROCYTE [DISTWIDTH] IN BLOOD BY AUTOMATED COUNT: 62.4 FL (ref 35.9–50)
ERYTHROCYTE [SEDIMENTATION RATE] IN BLOOD BY WESTERGREN METHOD: 1 MM/HOUR (ref 0–25)
GFR SERPLBLD CREATININE-BSD FMLA CKD-EPI: 96 ML/MIN/1.73 M 2
GLOBULIN SER CALC-MCNC: 3 G/DL (ref 1.9–3.5)
GLUCOSE SERPL-MCNC: 92 MG/DL (ref 65–99)
HCT VFR BLD AUTO: 51.2 % (ref 37–47)
HGB BLD-MCNC: 16.4 G/DL (ref 12–16)
IMM GRANULOCYTES # BLD AUTO: 0.02 K/UL (ref 0–0.11)
IMM GRANULOCYTES NFR BLD AUTO: 0.3 % (ref 0–0.9)
LYMPHOCYTES # BLD AUTO: 2.07 K/UL (ref 1–4.8)
LYMPHOCYTES NFR BLD: 29.7 % (ref 22–41)
MCH RBC QN AUTO: 27 PG (ref 27–33)
MCHC RBC AUTO-ENTMCNC: 32 G/DL (ref 33.6–35)
MCV RBC AUTO: 84.3 FL (ref 81.4–97.8)
MONOCYTES # BLD AUTO: 0.43 K/UL (ref 0–0.85)
MONOCYTES NFR BLD AUTO: 6.2 % (ref 0–13.4)
NEUTROPHILS # BLD AUTO: 4.35 K/UL (ref 2–7.15)
NEUTROPHILS NFR BLD: 62.5 % (ref 44–72)
NRBC # BLD AUTO: 0 K/UL
NRBC BLD-RTO: 0 /100 WBC
PLATELET # BLD AUTO: 294 K/UL (ref 164–446)
PMV BLD AUTO: 10.7 FL (ref 9–12.9)
POTASSIUM SERPL-SCNC: 4.3 MMOL/L (ref 3.6–5.5)
PROT SERPL-MCNC: 7.7 G/DL (ref 6–8.2)
RBC # BLD AUTO: 6.07 M/UL (ref 4.2–5.4)
SODIUM SERPL-SCNC: 140 MMOL/L (ref 135–145)
WBC # BLD AUTO: 7 K/UL (ref 4.8–10.8)

## 2022-10-12 PROCEDURE — 85652 RBC SED RATE AUTOMATED: CPT

## 2022-10-12 PROCEDURE — 86140 C-REACTIVE PROTEIN: CPT

## 2022-10-12 PROCEDURE — 80053 COMPREHEN METABOLIC PANEL: CPT

## 2022-10-12 PROCEDURE — 85025 COMPLETE CBC W/AUTO DIFF WBC: CPT

## 2022-10-12 PROCEDURE — 36415 COLL VENOUS BLD VENIPUNCTURE: CPT

## 2022-10-18 ENCOUNTER — PATIENT MESSAGE (OUTPATIENT)
Dept: MEDICAL GROUP | Facility: MEDICAL CENTER | Age: 37
End: 2022-10-18
Payer: MEDICARE

## 2022-10-18 DIAGNOSIS — E89.0 POSTOPERATIVE HYPOTHYROIDISM: ICD-10-CM

## 2022-10-19 ENCOUNTER — HOSPITAL ENCOUNTER (OUTPATIENT)
Dept: LAB | Facility: MEDICAL CENTER | Age: 37
End: 2022-10-19
Attending: INTERNAL MEDICINE
Payer: MEDICARE

## 2022-10-19 DIAGNOSIS — E89.0 POSTOPERATIVE HYPOTHYROIDISM: ICD-10-CM

## 2022-10-19 LAB — TSH SERPL DL<=0.005 MIU/L-ACNC: 3.79 UIU/ML (ref 0.38–5.33)

## 2022-10-19 PROCEDURE — 84443 ASSAY THYROID STIM HORMONE: CPT

## 2022-10-19 PROCEDURE — 36415 COLL VENOUS BLD VENIPUNCTURE: CPT

## 2022-10-24 ENCOUNTER — HOSPITAL ENCOUNTER (OUTPATIENT)
Facility: MEDICAL CENTER | Age: 37
End: 2022-10-24
Attending: PHYSICIAN ASSISTANT
Payer: MEDICARE

## 2022-10-24 ENCOUNTER — OFFICE VISIT (OUTPATIENT)
Dept: MEDICAL GROUP | Facility: MEDICAL CENTER | Age: 37
End: 2022-10-24
Payer: MEDICARE

## 2022-10-24 VITALS
OXYGEN SATURATION: 100 % | HEART RATE: 82 BPM | WEIGHT: 196 LBS | TEMPERATURE: 98.5 F | SYSTOLIC BLOOD PRESSURE: 124 MMHG | BODY MASS INDEX: 32.65 KG/M2 | HEIGHT: 65 IN | DIASTOLIC BLOOD PRESSURE: 80 MMHG

## 2022-10-24 DIAGNOSIS — N83.8 OVARIAN MASS, RIGHT: ICD-10-CM

## 2022-10-24 DIAGNOSIS — N89.8 VAGINAL DISCHARGE: ICD-10-CM

## 2022-10-24 DIAGNOSIS — N93.9 VAGINAL BLEEDING: ICD-10-CM

## 2022-10-24 DIAGNOSIS — Z01.419 ENCOUNTER FOR ANNUAL ROUTINE GYNECOLOGICAL EXAMINATION: ICD-10-CM

## 2022-10-24 DIAGNOSIS — Z12.4 ENCOUNTER FOR PAPANICOLAOU SMEAR FOR CERVICAL CANCER SCREENING: ICD-10-CM

## 2022-10-24 LAB
INT CON NEG: NORMAL
INT CON POS: NORMAL
POC URINE PREGNANCY TEST: NEGATIVE

## 2022-10-24 PROCEDURE — 81025 URINE PREGNANCY TEST: CPT | Performed by: PHYSICIAN ASSISTANT

## 2022-10-24 PROCEDURE — 87660 TRICHOMONAS VAGIN DIR PROBE: CPT

## 2022-10-24 PROCEDURE — 88175 CYTOPATH C/V AUTO FLUID REDO: CPT

## 2022-10-24 PROCEDURE — 87491 CHLMYD TRACH DNA AMP PROBE: CPT

## 2022-10-24 PROCEDURE — 87510 GARDNER VAG DNA DIR PROBE: CPT

## 2022-10-24 PROCEDURE — 87624 HPV HI-RISK TYP POOLED RSLT: CPT

## 2022-10-24 PROCEDURE — 87480 CANDIDA DNA DIR PROBE: CPT

## 2022-10-24 PROCEDURE — 87591 N.GONORRHOEAE DNA AMP PROB: CPT

## 2022-10-24 PROCEDURE — 99395 PREV VISIT EST AGE 18-39: CPT | Mod: 25 | Performed by: PHYSICIAN ASSISTANT

## 2022-10-24 RX ORDER — MEDROXYPROGESTERONE ACETATE 10 MG/1
10 TABLET ORAL DAILY
Qty: 10 TABLET | Refills: 0 | Status: SHIPPED | OUTPATIENT
Start: 2022-10-24 | End: 2022-11-03

## 2022-10-24 ASSESSMENT — FIBROSIS 4 INDEX: FIB4 SCORE: 0.77

## 2022-10-24 NOTE — PROGRESS NOTES
Subjective:   Annamaria Babin is a 37 y.o. female here today for Pap and irregular bleeding    No problem-specific Assessment & Plan notes found for this encounter.     Patient is a 37-year-old female with 3 children via vaginal delivery sexually active with 1 male partner x3 years who comes in with irregular bleeding for a few months intermittently as well as vaginal discharge.  She is due for a Pap and would like to get it done.  Has a history of right ovarian cyst that ultrasound confirmed.  She is pending gynecological referral.  She reports having vaginal bleeding since the beginning of October but notes that she did get on Depo at that time.  She does not need Depo for birth control but was trying to utilize it for control of her irregular..  She has had tubal ligation.  Patient used her Depo-Provera on August 10 and has no interest in repeating it    Current medicines (including changes today)  Current Outpatient Medications   Medication Sig Dispense Refill    medroxyPROGESTERone (PROVERA) 10 MG Tab Take 1 Tablet by mouth every day for 10 days. 10 Tablet 0    FLUoxetine (PROZAC) 10 MG Cap TAKE 1 CAPSULE BY MOUTH EVERY DAY 30 Capsule 0    loratadine (CLARITIN) 10 MG Tab TAKE 1 TABLET BY MOUTH EVERY DAY 90 Tablet 1    ferrous gluconate (FERGON) 324 (38 Fe) MG Tab TAKE 1 TABLET BY MOUTH EVERY DAY 90 Tablet 1    fluconazole (DIFLUCAN) 150 MG tablet Take 1 Tablet by mouth every day. 1 Tablet 0    levothyroxine (SYNTHROID) 150 MCG Tab Take 1 Tablet by mouth every morning on an empty stomach. 90 Tablet 0    montelukast (SINGULAIR) 10 MG Tab Take 1 Tablet by mouth every day. 90 Tablet 3    cetirizine (ZYRTEC) 10 MG Tab Take 1 Tablet by mouth 2 times a day. 180 Tablet 3    hydrOXYzine HCl (ATARAX) 10 MG Tab TAKE 1 TABLET BY MOUTH THREE TIMES A DAY AS NEEDED 270 Tablet 1    albuterol 108 (90 Base) MCG/ACT Aero Soln inhalation aerosol INHALE 2 PUFFS BY MOUTH EVERY 6 HOURS AS NEEDED FOR SHORTNESS OF BREATH 8.5 Each 6  "   famotidine (PEPCID) 20 MG Tab Take 1 Tab by mouth 2 times a day. 60 Tab 11    acyclovir (ZOVIRAX) 5 % Ointment APPLY TO COLD SORE 4 TIMES DAILY 30 g 0    therapeutic multivitamin-minerals (THERAGRAN-M) Tab Take 1 Tab by mouth every day.       No current facility-administered medications for this visit.     She  has a past medical history of Anemia, Anesthesia, Arthritis, Asthma, Enlarged liver, Gynecological disorder, H/O thyroidectomy (11/14/2013), Hashimoto disease, History of back surgery (11/14/2013), History of strabismus (11/14/2013), Indigestion, Liver disease, Mastocytosis, PONV (postoperative nausea and vomiting), Psychiatric problem, Renal disorder, S/P cholecystectomy (11/14/2013), and S/P inguinal hernia repair (11/14/2013).  Imuran [azathioprine sodium], Sulfasalazine, Plaquenil [hydroxychloroquine sulfate], Vicodin [hydrocodone-acetaminophen], Other drug, and Other misc     Social History and Family History were reviewed and updated.    ROS   No headaches, chest pain, no shortness of breath, abdominal pain, nausea, or vomiting.  All other systems were reviewed and are negative or noted as positive in the HPI.       Objective:     /80   Pulse 82   Temp 36.9 °C (98.5 °F) (Temporal)   Ht 1.638 m (5' 4.5\")   Wt 88.9 kg (196 lb)   SpO2 100%  Body mass index is 33.12 kg/m².     Physical Exam:  Constitutional: ANO x3, no acute distress, well-nourished, well-hydrated   Skin: Warm, dry, good turgor, no rashes in visible areas.    Breast examination: Normal breasts bilaterally.  T rest of exam was deferred genitalia: Normal external genitalia without masses or lesions noted.  Vaginal vault has blood present coming from the cervix there is no abnormal vaginal discharge noted.  Cervix is multiparous.  Cervix is nonfriable.  On manual examination.  There is no cervical motion tenderness noted.  Uterus and adnexa are nonpalpable.    Clinical Course/Lab Analysis:    Urine preg was " negative      Assessment and Plan:   The following treatment plan was discussed.  Signs and symptoms for which to return were discussed with patient at length.  Patient verbalized understanding.    1. Encounter for annual routine gynecological examination  - THINPREP PAP WITH HPV; Future    2. Encounter for Papanicolaou smear for cervical cancer screening  - THINPREP PAP WITH HPV; Future    3. Vaginal bleeding  New and unstable.  Has been intermittent but worse since getting Depo-Provera in August.  I do not recommend getting Depo again.  Depo has a big side effect of irregular bleeding so this is likely what is causing and/or contributing to irregular bleeding.  Patient has had tubal ligation in the past and does not need it for pregnancy purposes.  Discussed with her starting a 10-day course of medroxyprogesterone to try to break cycle starting on November 3.  I also discussed if persistent irregular cycle would recommend OCPs over Depo-Provera  - POCT Pregnancy  - medroxyPROGESTERone (PROVERA) 10 MG Tab; Take 1 Tablet by mouth every day for 10 days.  Dispense: 10 Tablet; Refill: 0    4. Vaginal discharge  New and unstable  Patient reports foul odor.  Do not see anything abnormal on exam but did cultures and GC chlamydia  - VAGINAL PATHOGENS DNA PANEL; Future  - Chlamydia/GC, PCR (Genital/Anal swab); Future    5. Ovarian mass, right  Chronic and stable.  Ultrasound was done recently that showed no change in size but do recommend MRI if clinically indicated.  She has been given referral to gynecology but is requesting MRI order.  Discussed that insurance may not cover it from a primary care standpoint.  She states she does not care.  Will order it but discussed that I will not do prior Auth if necessary then she needs to follow-up with OB/GYN      Followup:    Please note that this dictation was created using voice recognition software. I have made every reasonable attempt to correct obvious errors, but I expect  that there are errors of grammar and possibly content that I did not discover before finalizing the note.

## 2022-10-25 DIAGNOSIS — B96.89 BACTERIAL VAGINOSIS: ICD-10-CM

## 2022-10-25 DIAGNOSIS — N89.8 VAGINAL DISCHARGE: ICD-10-CM

## 2022-10-25 DIAGNOSIS — Z01.419 ENCOUNTER FOR ANNUAL ROUTINE GYNECOLOGICAL EXAMINATION: ICD-10-CM

## 2022-10-25 DIAGNOSIS — Z12.4 ENCOUNTER FOR PAPANICOLAOU SMEAR FOR CERVICAL CANCER SCREENING: ICD-10-CM

## 2022-10-25 DIAGNOSIS — N76.0 BACTERIAL VAGINOSIS: ICD-10-CM

## 2022-10-25 LAB
C TRACH DNA GENITAL QL NAA+PROBE: NEGATIVE
CANDIDA DNA VAG QL PROBE+SIG AMP: NEGATIVE
CYTOLOGY REG CYTOL: NORMAL
G VAGINALIS DNA VAG QL PROBE+SIG AMP: POSITIVE
HPV HR 12 DNA CVX QL NAA+PROBE: NEGATIVE
HPV16 DNA SPEC QL NAA+PROBE: NEGATIVE
HPV18 DNA SPEC QL NAA+PROBE: NEGATIVE
N GONORRHOEA DNA GENITAL QL NAA+PROBE: NEGATIVE
SPECIMEN SOURCE: NORMAL
SPECIMEN SOURCE: NORMAL
T VAGINALIS DNA VAG QL PROBE+SIG AMP: NEGATIVE

## 2022-10-25 RX ORDER — METRONIDAZOLE 500 MG/1
TABLET ORAL
Qty: 14 TABLET | Refills: 0 | Status: SHIPPED | OUTPATIENT
Start: 2022-10-25 | End: 2023-05-11

## 2022-10-26 DIAGNOSIS — F33.2 SEVERE EPISODE OF RECURRENT MAJOR DEPRESSIVE DISORDER, WITHOUT PSYCHOTIC FEATURES (HCC): ICD-10-CM

## 2022-10-27 RX ORDER — FLUOXETINE 10 MG/1
10 CAPSULE ORAL DAILY
Qty: 30 CAPSULE | Refills: 0 | Status: SHIPPED | OUTPATIENT
Start: 2022-10-27 | End: 2022-12-20

## 2022-11-04 ENCOUNTER — DOCUMENTATION (OUTPATIENT)
Dept: HEALTH INFORMATION MANAGEMENT | Facility: OTHER | Age: 37
End: 2022-11-04
Payer: MEDICARE

## 2022-11-15 ENCOUNTER — OFFICE VISIT (OUTPATIENT)
Dept: MEDICAL GROUP | Facility: MEDICAL CENTER | Age: 37
End: 2022-11-15
Payer: MEDICARE

## 2022-11-15 VITALS
SYSTOLIC BLOOD PRESSURE: 130 MMHG | BODY MASS INDEX: 34.31 KG/M2 | HEART RATE: 66 BPM | HEIGHT: 64 IN | TEMPERATURE: 98.6 F | OXYGEN SATURATION: 98 % | WEIGHT: 201 LBS | DIASTOLIC BLOOD PRESSURE: 90 MMHG

## 2022-11-15 DIAGNOSIS — N93.9 VAGINAL BLEEDING: ICD-10-CM

## 2022-11-15 DIAGNOSIS — N92.6 IRREGULAR PERIODS: ICD-10-CM

## 2022-11-15 DIAGNOSIS — N83.8 OVARIAN MASS: ICD-10-CM

## 2022-11-15 DIAGNOSIS — E06.3 HASHIMOTO'S THYROIDITIS: ICD-10-CM

## 2022-11-15 PROCEDURE — 99214 OFFICE O/P EST MOD 30 MIN: CPT | Performed by: PHYSICIAN ASSISTANT

## 2022-11-15 ASSESSMENT — FIBROSIS 4 INDEX: FIB4 SCORE: 0.77

## 2022-11-15 NOTE — PROGRESS NOTES
Subjective:   No chief complaint on file.    Annamaria Babin is a 37 y.o. female here today to follow to Discuss:    HPI:    Pleasant 37-year-old female is here to discuss medical concerns.  Was previously a patient of another provider in our office but is transferring care.  Has a history of Hashimoto's thyroiditis.  Was hyperthyroid on medication and then had her thyroid movement removed because she had a goiter in 2011.  Subsequently being put into a hypothyroid state.  Patient has been stable on thyroid medications but had some lab abnormalities and her thyroid medication was changed from 224-88 in August 2022      Subsequently she also then got got Depo in August 2022 because she was having irregular bleeding.  This worsened the situation and she was bleeding daily.  She did do a 10-day course of progesterone and this helped alleviate thick blood clots but she still is having slight bleeding daily.    Patient also reports having history of right ovarian cyst.    Current medicines (including changes today)  Current Outpatient Medications   Medication Sig Dispense Refill    levothyroxine (SYNTHROID) 150 MCG Tab TAKE 1 TABLET BY MOUTH EVERY DAY IN THE MORNING ON AN EMPTY STOMACH 90 Tablet 1    FLUoxetine (PROZAC) 10 MG Cap TAKE 1 CAPSULE BY MOUTH EVERY DAY 30 Capsule 0    metroNIDAZOLE (FLAGYL) 500 MG Tab Take one pill twice a day for seven days 14 Tablet 0    loratadine (CLARITIN) 10 MG Tab TAKE 1 TABLET BY MOUTH EVERY DAY 90 Tablet 1    ferrous gluconate (FERGON) 324 (38 Fe) MG Tab TAKE 1 TABLET BY MOUTH EVERY DAY 90 Tablet 1    fluconazole (DIFLUCAN) 150 MG tablet Take 1 Tablet by mouth every day. 1 Tablet 0    montelukast (SINGULAIR) 10 MG Tab Take 1 Tablet by mouth every day. 90 Tablet 3    cetirizine (ZYRTEC) 10 MG Tab Take 1 Tablet by mouth 2 times a day. 180 Tablet 3    hydrOXYzine HCl (ATARAX) 10 MG Tab TAKE 1 TABLET BY MOUTH THREE TIMES A DAY AS NEEDED 270 Tablet 1    albuterol 108 (90 Base) MCG/ACT  "Aero Soln inhalation aerosol INHALE 2 PUFFS BY MOUTH EVERY 6 HOURS AS NEEDED FOR SHORTNESS OF BREATH 8.5 Each 6    famotidine (PEPCID) 20 MG Tab Take 1 Tab by mouth 2 times a day. 60 Tab 11    acyclovir (ZOVIRAX) 5 % Ointment APPLY TO COLD SORE 4 TIMES DAILY 30 g 0    therapeutic multivitamin-minerals (THERAGRAN-M) Tab Take 1 Tab by mouth every day.       No current facility-administered medications for this visit.     She  has a past medical history of Anemia, Anesthesia, Arthritis, Asthma, Enlarged liver, Gynecological disorder, H/O thyroidectomy (11/14/2013), Hashimoto disease, History of back surgery (11/14/2013), History of strabismus (11/14/2013), Indigestion, Liver disease, Mastocytosis, PONV (postoperative nausea and vomiting), Psychiatric problem, Renal disorder, S/P cholecystectomy (11/14/2013), and S/P inguinal hernia repair (11/14/2013).    ROS  As per listed in the HPI, otherwise negative     Objective:     BP (!) 130/90 (BP Location: Right arm, Patient Position: Sitting, BP Cuff Size: Adult)   Pulse 66   Temp 37 °C (98.6 °F) (Temporal)   Ht 1.626 m (5' 4\")   Wt 91.2 kg (201 lb)   SpO2 98%  Body mass index is 34.5 kg/m².     Physical Exam:  General: Patient appears well-nourished, well-hydrated, nontoxic  HEENT, normocephalic atraumatic, PERRLA, extraocular movements intact, nares are patent and clear  Neck: No visible masses, thyromegaly or abnormalities noted  Cardiovascular.  Sitting comfortably without visible signs of edema  Lungs: No cyanosis noted, nondyspneic  Skin: Well perfused without evidence of rash or lesions  Neurological: Cranial nerves II through XII intact, normal gait  Musculoskeletal: Normal range of motion, normal strength and no deficit noted       Assessment and Plan:   The following treatment plan was discussed and signs and symptoms for which to return were discussed with patient.  Patient verbalized understanding.    1. Vaginal bleeding  New and unstable  Patient's vaginal " bleeding is likely attributable to thyroid dysfunction and change in medication.  Unclear why values went from 224 mcg daily to 88 mcg daily.  I see a prescription in the system for 150 mcg from previous provider.  I would like her to take this dose for the next 4 weeks and recheck thyroid function with labs.  Also strongly recommend no more Depo.  Patient is not can have any more she is already had her tubes tied this was a short-term solution suggested by previous provider to help with irregular periods but it made it worse    2. Irregular periods  New and unstable  Patient's had recent pelvic examination.  She has her tubes tied.  She is not pregnant.  Please see assessment above    3. Hashimoto's thyroiditis  Chronic and unstable  Appears as if patient's bleeding cyst was secondary to some thyroid dysfunction.  I am going to make sure she takes levothyroxine 150 mcg but also check a T3 to see if a small dose of Cytomel might help.  I will send her to Dr. Dinorah Bruce  - TSH WITH REFLEX TO FT4; Future  - T3 FREE; Future  - Referral to Endocrinology    4. Ovarian mass  Chronic and unstable  Patient recently had pelvic ultrasound which showed a 1.1 cm solid nodule in the right ovary.  Suggested possible MRI for further evaluation.  I am putting in for the MRI with and without contrast to evaluate the characteristics further.  She also has pending gynecology referral in January  - MR-PELVIS-WITH & W/O AND SEQUENCES; Future       Followup: 4 to 6 mo         Please note that this dictation was created using voice recognition software. I have made every reasonable attempt to correct obvious errors, but I expect that there are errors of grammar and possibly content that I did not discover before finalizing the note.

## 2022-11-19 ENCOUNTER — HOSPITAL ENCOUNTER (OUTPATIENT)
Dept: LAB | Facility: MEDICAL CENTER | Age: 37
End: 2022-11-19
Attending: PHYSICIAN ASSISTANT
Payer: MEDICARE

## 2022-11-19 DIAGNOSIS — E06.3 HASHIMOTO'S THYROIDITIS: ICD-10-CM

## 2022-11-19 LAB
T3FREE SERPL-MCNC: 2.1 PG/ML (ref 2–4.4)
T4 FREE SERPL-MCNC: 0.79 NG/DL (ref 0.93–1.7)
TSH SERPL DL<=0.005 MIU/L-ACNC: 9.64 UIU/ML (ref 0.38–5.33)

## 2022-11-19 PROCEDURE — 36415 COLL VENOUS BLD VENIPUNCTURE: CPT

## 2022-11-19 PROCEDURE — 84439 ASSAY OF FREE THYROXINE: CPT

## 2022-11-19 PROCEDURE — 84443 ASSAY THYROID STIM HORMONE: CPT

## 2022-11-19 PROCEDURE — 84481 FREE ASSAY (FT-3): CPT

## 2022-11-22 DIAGNOSIS — E06.3 HASHIMOTO'S THYROIDITIS: ICD-10-CM

## 2022-11-22 RX ORDER — LEVOTHYROXINE SODIUM 175 UG/1
175 TABLET ORAL
Qty: 30 TABLET | Refills: 3 | Status: SHIPPED | OUTPATIENT
Start: 2022-11-22 | End: 2023-11-10

## 2022-12-05 ENCOUNTER — HOSPITAL ENCOUNTER (OUTPATIENT)
Dept: RADIOLOGY | Facility: MEDICAL CENTER | Age: 37
End: 2022-12-05
Attending: PHYSICIAN ASSISTANT
Payer: MEDICARE

## 2022-12-05 DIAGNOSIS — N83.8 OVARIAN MASS: ICD-10-CM

## 2022-12-05 PROCEDURE — A9576 INJ PROHANCE MULTIPACK: HCPCS | Performed by: PHYSICIAN ASSISTANT

## 2022-12-05 PROCEDURE — 700117 HCHG RX CONTRAST REV CODE 255: Performed by: PHYSICIAN ASSISTANT

## 2022-12-05 PROCEDURE — 72197 MRI PELVIS W/O & W/DYE: CPT

## 2022-12-05 RX ADMIN — GADOTERIDOL 20 ML: 279.3 INJECTION, SOLUTION INTRAVENOUS at 09:32

## 2022-12-20 ENCOUNTER — APPOINTMENT (OUTPATIENT)
Dept: MEDICAL GROUP | Facility: MEDICAL CENTER | Age: 37
End: 2022-12-20
Payer: MEDICARE

## 2022-12-20 ENCOUNTER — OFFICE VISIT (OUTPATIENT)
Dept: MEDICAL GROUP | Facility: MEDICAL CENTER | Age: 37
End: 2022-12-20
Payer: MEDICARE

## 2022-12-20 VITALS
TEMPERATURE: 98.5 F | WEIGHT: 207 LBS | OXYGEN SATURATION: 99 % | HEART RATE: 71 BPM | HEIGHT: 64 IN | DIASTOLIC BLOOD PRESSURE: 80 MMHG | RESPIRATION RATE: 16 BRPM | BODY MASS INDEX: 35.34 KG/M2 | SYSTOLIC BLOOD PRESSURE: 116 MMHG

## 2022-12-20 DIAGNOSIS — F33.2 SEVERE EPISODE OF RECURRENT MAJOR DEPRESSIVE DISORDER, WITHOUT PSYCHOTIC FEATURES (HCC): ICD-10-CM

## 2022-12-20 DIAGNOSIS — N92.6 IRREGULAR PERIODS: ICD-10-CM

## 2022-12-20 DIAGNOSIS — N83.8 OVARIAN MASS, RIGHT: ICD-10-CM

## 2022-12-20 DIAGNOSIS — E06.3 HASHIMOTO'S THYROIDITIS: ICD-10-CM

## 2022-12-20 PROCEDURE — 99214 OFFICE O/P EST MOD 30 MIN: CPT | Performed by: PHYSICIAN ASSISTANT

## 2022-12-20 RX ORDER — SERTRALINE HYDROCHLORIDE 25 MG/1
25 TABLET, FILM COATED ORAL DAILY
Qty: 30 TABLET | Refills: 11 | Status: SHIPPED | OUTPATIENT
Start: 2022-12-20 | End: 2023-01-16

## 2022-12-20 ASSESSMENT — FIBROSIS 4 INDEX: FIB4 SCORE: 0.77

## 2022-12-20 NOTE — PROGRESS NOTES
Subjective:   Annamaria Babin is a 37 y.o. female here today for     HPI:Patient is here to discuss:      Patient is here to go over her history of irregular periods as well as hypothyroidism patient is endocrinology who modified her dose of Synthroid.  Her bleeding has been improved but she still seems to have an irregular period.  She has appoint with OB/GYN today.  She denies shortness breath, chest pain or fatigue.  We will discuss results of her MRI as well    Current medicines (including changes today)  Current Outpatient Medications   Medication Sig Dispense Refill    sertraline (ZOLOFT) 25 MG tablet Take 1 Tablet by mouth every day. 30 Tablet 11    levothyroxine (SYNTHROID) 175 MCG Tab Take 1 Tablet by mouth every morning on an empty stomach. 30 Tablet 3    metroNIDAZOLE (FLAGYL) 500 MG Tab Take one pill twice a day for seven days 14 Tablet 0    loratadine (CLARITIN) 10 MG Tab TAKE 1 TABLET BY MOUTH EVERY DAY 90 Tablet 1    ferrous gluconate (FERGON) 324 (38 Fe) MG Tab TAKE 1 TABLET BY MOUTH EVERY DAY 90 Tablet 1    fluconazole (DIFLUCAN) 150 MG tablet Take 1 Tablet by mouth every day. 1 Tablet 0    montelukast (SINGULAIR) 10 MG Tab Take 1 Tablet by mouth every day. 90 Tablet 3    cetirizine (ZYRTEC) 10 MG Tab Take 1 Tablet by mouth 2 times a day. 180 Tablet 3    hydrOXYzine HCl (ATARAX) 10 MG Tab TAKE 1 TABLET BY MOUTH THREE TIMES A DAY AS NEEDED 270 Tablet 1    albuterol 108 (90 Base) MCG/ACT Aero Soln inhalation aerosol INHALE 2 PUFFS BY MOUTH EVERY 6 HOURS AS NEEDED FOR SHORTNESS OF BREATH 8.5 Each 6    famotidine (PEPCID) 20 MG Tab Take 1 Tab by mouth 2 times a day. 60 Tab 11    acyclovir (ZOVIRAX) 5 % Ointment APPLY TO COLD SORE 4 TIMES DAILY 30 g 0    therapeutic multivitamin-minerals (THERAGRAN-M) Tab Take 1 Tab by mouth every day.       No current facility-administered medications for this visit.     She  has a past medical history of Anemia, Anesthesia, Arthritis, Asthma, Enlarged liver,  "Gynecological disorder, H/O thyroidectomy (11/14/2013), Hashimoto disease, History of back surgery (11/14/2013), History of strabismus (11/14/2013), Indigestion, Liver disease, Mastocytosis, PONV (postoperative nausea and vomiting), Psychiatric problem, Renal disorder, S/P cholecystectomy (11/14/2013), and S/P inguinal hernia repair (11/14/2013).  Imuran [azathioprine sodium], Sulfasalazine, Plaquenil [hydroxychloroquine sulfate], Vicodin [hydrocodone-acetaminophen], Other drug, and Other misc     Social History and Family History were reviewed and updated.    ROS   No headaches, chest pain, no shortness of breath, abdominal pain, nausea, or vomiting.  All other systems were reviewed and are negative or noted as positive in the HPI.       Objective:     /80   Pulse 71   Temp 36.9 °C (98.5 °F) (Temporal)   Resp 16   Ht 1.626 m (5' 4\")   Wt 93.9 kg (207 lb)   SpO2 99%  Body mass index is 35.53 kg/m².     Physical Exam:  General: Patient appears well-nourished, well-hydrated, nontoxic  HEENT, normocephalic atraumatic, PERRLA, extraocular movements intact, nares are patent and clear  Neck: No visible masses, thyromegaly or abnormalities noted  Cardiovascular.  Sitting comfortably without visible signs of edema  Lungs: No cyanosis noted, nondyspneic  Skin: Well perfused without evidence of rash or lesions  Neurological: Cranial nerves II through XII intact, normal gait  Musculoskeletal: Normal range of motion, normal strength and no deficit noted     Clinical Course/Lab Analysis:  MRI:abdomen/pelvis  IMPRESSION:     Multiple cysts in the right ovary. No discrete mass identified    Assessment and Plan:   The following treatment plan was discussed.  Signs and symptoms for which to return were discussed with patient at length.  Patient verbalized understanding.    Problem List Items Addressed This Visit       Hashimoto's thyroiditis     Chronic and unstable  Patient has had some struggles getting her thyroid " function under control.  She did see Dr. EHSAN johnston at [facility] who wants her to be at a dose of 175 mcg and using Synthroid only.  She has close interval follow-up January 10 to check thyroid function         Severe episode of recurrent major depressive disorder, without psychotic features (HCC)     Chronic and unstable  Tried bupropion and didn't like  How she felt  Also tried prozac 10mg but only for a week and didn't feel an effect    I will try sertraline 25mg and have her follow         Relevant Medications    sertraline (ZOLOFT) 25 MG tablet    Other Relevant Orders    Referral to Psychiatry    Irregular periods     Chronic and unstable  Patient has had struggles since having some abnormal thyroid function as well as being on Depo-Provera.  Patient will be seeing OB/GYN today for further evaluation.  She is no longer on Depo and has not been for a few months         Ovarian mass, right     Ultrasound showed some concerning cyst/mass on the right ovary.  I ordered MRI which revealed multiple cysts but no concerning, dangerous mass.  Patient is following with OB/GYN today to discuss cysts relation to possible irregular periods and what can be done               Followup: 4 to 6 weeks to go over sertraline and how affecting her mood.  I did give her referral to psychiatry that she like to be evaluated for ADHD.  Did discuss a monophasic birth control pill plus sertraline may very beneficial in the controlling her irregular periods as well as helping stabilize mood.  She will consider this next visit    Please note that this dictation was created using voice recognition software. I have made every reasonable attempt to correct obvious errors, but I expect that there are errors of grammar and possibly content that I did not discover before finalizing the note.

## 2022-12-20 NOTE — ASSESSMENT & PLAN NOTE
Chronic and unstable  Tried bupropion and didn't like  How she felt  Also tried prozac 10mg but only for a week and didn't feel an effect    I will try sertraline 25mg and have her follow

## 2022-12-20 NOTE — ASSESSMENT & PLAN NOTE
Chronic and unstable  Patient has had struggles since having some abnormal thyroid function as well as being on Depo-Provera.  Patient will be seeing OB/GYN today for further evaluation.  She is no longer on Depo and has not been for a few months

## 2022-12-20 NOTE — ASSESSMENT & PLAN NOTE
Chronic and unstable  Patient has had some struggles getting her thyroid function under control.  She did see Dr. EHSAN johnston at [facility] who wants her to be at a dose of 175 mcg and using Synthroid only.  She has close interval follow-up January 10 to check thyroid function

## 2022-12-20 NOTE — ASSESSMENT & PLAN NOTE
Ultrasound showed some concerning cyst/mass on the right ovary.  I ordered MRI which revealed multiple cysts but no concerning, dangerous mass.  Patient is following with OB/GYN today to discuss cysts relation to possible irregular periods and what can be done

## 2023-01-11 DIAGNOSIS — F33.2 SEVERE EPISODE OF RECURRENT MAJOR DEPRESSIVE DISORDER, WITHOUT PSYCHOTIC FEATURES (HCC): ICD-10-CM

## 2023-01-13 NOTE — TELEPHONE ENCOUNTER
Received request via: Pharmacy    Was the patient seen in the last year in this department? Yes    Does the patient have an active prescription (recently filled or refills available) for medication(s) requested? No    Does the patient have intermediate Plus and need 100 day supply (blood pressure, diabetes and cholesterol meds only)? Patient does not have SCP

## 2023-01-16 RX ORDER — SERTRALINE HYDROCHLORIDE 25 MG/1
25 TABLET, FILM COATED ORAL DAILY
Qty: 30 TABLET | Refills: 11 | Status: SHIPPED | OUTPATIENT
Start: 2023-01-16 | End: 2023-05-11

## 2023-01-23 ENCOUNTER — HOSPITAL ENCOUNTER (OUTPATIENT)
Dept: LAB | Facility: MEDICAL CENTER | Age: 38
End: 2023-01-23
Attending: OBSTETRICS & GYNECOLOGY
Payer: MEDICARE

## 2023-01-23 ENCOUNTER — HOSPITAL ENCOUNTER (OUTPATIENT)
Dept: LAB | Facility: MEDICAL CENTER | Age: 38
End: 2023-01-23
Attending: INTERNAL MEDICINE
Payer: MEDICARE

## 2023-01-23 LAB
25(OH)D3 SERPL-MCNC: 25 NG/ML (ref 30–100)
BASOPHILS # BLD AUTO: 0.5 % (ref 0–1.8)
BASOPHILS # BLD: 0.03 K/UL (ref 0–0.12)
CORTIS SERPL-MCNC: 9.9 UG/DL (ref 0–23)
EOSINOPHIL # BLD AUTO: 0.09 K/UL (ref 0–0.51)
EOSINOPHIL NFR BLD: 1.5 % (ref 0–6.9)
ERYTHROCYTE [DISTWIDTH] IN BLOOD BY AUTOMATED COUNT: 39.3 FL (ref 35.9–50)
ESTRADIOL SERPL-MCNC: 218 PG/ML
FSH SERPL-ACNC: 7.9 MIU/ML
FSH SERPL-ACNC: 8 MIU/ML
HCT VFR BLD AUTO: 44.9 % (ref 37–47)
HGB BLD-MCNC: 15 G/DL (ref 12–16)
IMM GRANULOCYTES # BLD AUTO: 0.02 K/UL (ref 0–0.11)
IMM GRANULOCYTES NFR BLD AUTO: 0.3 % (ref 0–0.9)
LH SERPL-ACNC: 15.3 IU/L
LH SERPL-ACNC: 15.4 IU/L
LYMPHOCYTES # BLD AUTO: 2.1 K/UL (ref 1–4.8)
LYMPHOCYTES NFR BLD: 35.1 % (ref 22–41)
MCH RBC QN AUTO: 29.8 PG (ref 27–33)
MCHC RBC AUTO-ENTMCNC: 33.4 G/DL (ref 33.6–35)
MCV RBC AUTO: 89.1 FL (ref 81.4–97.8)
MONOCYTES # BLD AUTO: 0.43 K/UL (ref 0–0.85)
MONOCYTES NFR BLD AUTO: 7.2 % (ref 0–13.4)
NEUTROPHILS # BLD AUTO: 3.31 K/UL (ref 2–7.15)
NEUTROPHILS NFR BLD: 55.4 % (ref 44–72)
NRBC # BLD AUTO: 0 K/UL
NRBC BLD-RTO: 0 /100 WBC
PLATELET # BLD AUTO: 299 K/UL (ref 164–446)
PMV BLD AUTO: 10.5 FL (ref 9–12.9)
PROLACTIN SERPL-MCNC: 15 NG/ML (ref 2.8–26)
PROLACTIN SERPL-MCNC: 15.2 NG/ML (ref 2.8–26)
RBC # BLD AUTO: 5.04 M/UL (ref 4.2–5.4)
T3FREE SERPL-MCNC: 2.42 PG/ML (ref 2–4.4)
T4 FREE SERPL-MCNC: 1.16 NG/DL (ref 0.93–1.7)
TESTOST SERPL-MCNC: 27 NG/DL (ref 9–75)
TSH SERPL DL<=0.005 MIU/L-ACNC: 1.52 UIU/ML (ref 0.38–5.33)
VIT B12 SERPL-MCNC: 355 PG/ML (ref 211–911)
WBC # BLD AUTO: 6 K/UL (ref 4.8–10.8)

## 2023-01-23 PROCEDURE — 84146 ASSAY OF PROLACTIN: CPT

## 2023-01-23 PROCEDURE — 84481 FREE ASSAY (FT-3): CPT

## 2023-01-23 PROCEDURE — 84270 ASSAY OF SEX HORMONE GLOBUL: CPT

## 2023-01-23 PROCEDURE — 84443 ASSAY THYROID STIM HORMONE: CPT

## 2023-01-23 PROCEDURE — 82306 VITAMIN D 25 HYDROXY: CPT

## 2023-01-23 PROCEDURE — 84305 ASSAY OF SOMATOMEDIN: CPT

## 2023-01-23 PROCEDURE — 82670 ASSAY OF TOTAL ESTRADIOL: CPT

## 2023-01-23 PROCEDURE — 84146 ASSAY OF PROLACTIN: CPT | Mod: 91

## 2023-01-23 PROCEDURE — 82024 ASSAY OF ACTH: CPT

## 2023-01-23 PROCEDURE — 85025 COMPLETE CBC W/AUTO DIFF WBC: CPT

## 2023-01-23 PROCEDURE — 84403 ASSAY OF TOTAL TESTOSTERONE: CPT

## 2023-01-23 PROCEDURE — 36415 COLL VENOUS BLD VENIPUNCTURE: CPT

## 2023-01-23 PROCEDURE — 84439 ASSAY OF FREE THYROXINE: CPT

## 2023-01-23 PROCEDURE — 82533 TOTAL CORTISOL: CPT

## 2023-01-23 PROCEDURE — 83001 ASSAY OF GONADOTROPIN (FSH): CPT | Mod: 91

## 2023-01-23 PROCEDURE — 82607 VITAMIN B-12: CPT

## 2023-01-23 PROCEDURE — 83002 ASSAY OF GONADOTROPIN (LH): CPT

## 2023-01-23 PROCEDURE — 83001 ASSAY OF GONADOTROPIN (FSH): CPT

## 2023-01-23 PROCEDURE — 83002 ASSAY OF GONADOTROPIN (LH): CPT | Mod: 91

## 2023-01-25 LAB
ACTH PLAS-MCNC: 13.1 PG/ML (ref 7.2–63.3)
IGF-I SERPL-MCNC: 181 NG/ML (ref 79–276)
IGF-I Z-SCORE SERPL: 0.5
SHBG SERPL-SCNC: 50 NMOL/L (ref 25–122)

## 2023-01-30 ENCOUNTER — APPOINTMENT (OUTPATIENT)
Dept: MEDICAL GROUP | Facility: MEDICAL CENTER | Age: 38
End: 2023-01-30
Payer: MEDICARE

## 2023-03-16 DIAGNOSIS — E55.9 VITAMIN D INSUFFICIENCY: ICD-10-CM

## 2023-03-16 RX ORDER — ERGOCALCIFEROL 1.25 MG/1
50000 CAPSULE ORAL
Qty: 12 CAPSULE | Refills: 3 | Status: SHIPPED | OUTPATIENT
Start: 2023-03-16 | End: 2023-05-11

## 2023-04-25 ENCOUNTER — HOSPITAL ENCOUNTER (OUTPATIENT)
Dept: LAB | Facility: MEDICAL CENTER | Age: 38
End: 2023-04-25
Attending: INTERNAL MEDICINE
Payer: MEDICARE

## 2023-04-25 PROCEDURE — 84443 ASSAY THYROID STIM HORMONE: CPT

## 2023-04-25 PROCEDURE — 82306 VITAMIN D 25 HYDROXY: CPT

## 2023-04-25 PROCEDURE — 84439 ASSAY OF FREE THYROXINE: CPT

## 2023-04-25 PROCEDURE — 36415 COLL VENOUS BLD VENIPUNCTURE: CPT

## 2023-04-25 PROCEDURE — 84481 FREE ASSAY (FT-3): CPT

## 2023-04-26 LAB
25(OH)D3 SERPL-MCNC: 37 NG/ML (ref 30–100)
T3FREE SERPL-MCNC: 2.87 PG/ML (ref 2–4.4)
T4 FREE SERPL-MCNC: 1.63 NG/DL (ref 0.93–1.7)
TSH SERPL DL<=0.005 MIU/L-ACNC: 0.03 UIU/ML (ref 0.38–5.33)

## 2023-05-11 ENCOUNTER — OFFICE VISIT (OUTPATIENT)
Dept: MEDICAL GROUP | Facility: MEDICAL CENTER | Age: 38
End: 2023-05-11
Payer: MEDICARE

## 2023-05-11 VITALS
HEART RATE: 82 BPM | TEMPERATURE: 100.4 F | HEIGHT: 65 IN | WEIGHT: 207 LBS | BODY MASS INDEX: 34.49 KG/M2 | SYSTOLIC BLOOD PRESSURE: 130 MMHG | DIASTOLIC BLOOD PRESSURE: 89 MMHG | OXYGEN SATURATION: 99 % | RESPIRATION RATE: 20 BRPM

## 2023-05-11 DIAGNOSIS — F33.2 SEVERE EPISODE OF RECURRENT MAJOR DEPRESSIVE DISORDER, WITHOUT PSYCHOTIC FEATURES (HCC): ICD-10-CM

## 2023-05-11 DIAGNOSIS — E06.3 HASHIMOTO'S THYROIDITIS: ICD-10-CM

## 2023-05-11 DIAGNOSIS — N83.8 OVARIAN MASS, RIGHT: ICD-10-CM

## 2023-05-11 DIAGNOSIS — J06.9 URI, ACUTE: ICD-10-CM

## 2023-05-11 DIAGNOSIS — J98.8 RTI (RESPIRATORY TRACT INFECTION): ICD-10-CM

## 2023-05-11 LAB
FLUAV RNA SPEC QL NAA+PROBE: NEGATIVE
FLUBV RNA SPEC QL NAA+PROBE: NEGATIVE
RSV RNA SPEC QL NAA+PROBE: NEGATIVE
S PYO DNA SPEC NAA+PROBE: NOT DETECTED
SARS-COV-2 RNA RESP QL NAA+PROBE: NEGATIVE

## 2023-05-11 PROCEDURE — 0241U POCT CEPHEID COV-2, FLU A/B, RSV - PCR: CPT | Performed by: PHYSICIAN ASSISTANT

## 2023-05-11 PROCEDURE — 3079F DIAST BP 80-89 MM HG: CPT | Performed by: PHYSICIAN ASSISTANT

## 2023-05-11 PROCEDURE — 1126F AMNT PAIN NOTED NONE PRSNT: CPT | Performed by: PHYSICIAN ASSISTANT

## 2023-05-11 PROCEDURE — 99214 OFFICE O/P EST MOD 30 MIN: CPT | Performed by: PHYSICIAN ASSISTANT

## 2023-05-11 PROCEDURE — 3075F SYST BP GE 130 - 139MM HG: CPT | Performed by: PHYSICIAN ASSISTANT

## 2023-05-11 PROCEDURE — 87651 STREP A DNA AMP PROBE: CPT | Performed by: PHYSICIAN ASSISTANT

## 2023-05-11 RX ORDER — DEXTROMETHORPHAN HYDROBROMIDE AND PROMETHAZINE HYDROCHLORIDE 15; 6.25 MG/5ML; MG/5ML
SYRUP ORAL
Qty: 180 ML | Refills: 0 | Status: SHIPPED | OUTPATIENT
Start: 2023-05-11 | End: 2023-09-26

## 2023-05-11 RX ORDER — AZITHROMYCIN 250 MG/1
TABLET, FILM COATED ORAL
Qty: 6 TABLET | Refills: 0 | Status: SHIPPED | OUTPATIENT
Start: 2023-05-11 | End: 2023-09-26

## 2023-05-11 RX ORDER — IBUPROFEN 600 MG/1
600 TABLET ORAL EVERY 6 HOURS PRN
Qty: 30 TABLET | Refills: 0 | Status: SHIPPED | OUTPATIENT
Start: 2023-05-11

## 2023-05-11 ASSESSMENT — PATIENT HEALTH QUESTIONNAIRE - PHQ9
SUM OF ALL RESPONSES TO PHQ QUESTIONS 1-9: 3
CLINICAL INTERPRETATION OF PHQ2 SCORE: 1
5. POOR APPETITE OR OVEREATING: 1 - SEVERAL DAYS

## 2023-05-11 ASSESSMENT — FIBROSIS 4 INDEX: FIB4 SCORE: 0.77

## 2023-05-11 NOTE — PROGRESS NOTES
"Subjective:   Annamaria Babin is a 38 y.o. female here today for     HPI:Patient is here to discuss:    Problem   Rti (Respiratory Tract Infection)    4-day history of body, chills, cough and fever.  Has been vaccinated.  Denies known sick contacts he denies shortness of breath, dizziness, chest pain or sore throat       Ovarian Mass, Right    5/11/23: ablation was done in 4, 2023: for RT ovarian cyst/mass. Pelvic MRI done and patient being followed by gyn       Severe Episode of Recurrent Major Depressive Disorder, Without Psychotic Features (Hcc)    5/11/2023: Patient had been on bupropion and Prozac 10 mg in the past but did not feel they worked and did not like how she felt on them.  Has been taking sertraline 25 mg for some time approximately 6 months.  Feels it is working but would like to increase the dose.           ROS   No headaches, chest pain, no shortness of breath, abdominal pain, nausea, or vomiting.  All other systems were reviewed and are negative or noted as positive in the HPI.     Objective:     /89 (BP Location: Right arm, Patient Position: Sitting, BP Cuff Size: Adult)   Pulse 82   Temp 38 °C (100.4 °F) (Temporal)   Resp 20   Ht 1.651 m (5' 5\")   Wt 93.9 kg (207 lb)   SpO2 99%  Body mass index is 34.45 kg/m².     Physical Exam:  General: Patient appears well-nourished, well-hydrated, nontoxic  HEENT, normocephalic atraumatic, PERRLA, extraocular movements intact, nares are patent and clear  Neck: No visible masses, thyromegaly or abnormalities noted  Cardiovascular.  Sitting comfortably without visible signs of edema  Lungs: No cyanosis noted, nondyspneic  Skin: Well perfused without evidence of rash or lesions  Neurological: Cranial nerves II through XII intact, normal gait  Musculoskeletal: Normal range of motion, normal strength and no deficit noted     Clinical Course/Lab Analysis:  Office Visit on 05/11/2023   Component Date Value Ref Range Status    SARS-CoV-2 by PCR " 05/11/2023 Negative  Negative, Invalid Final    Influenza virus A RNA 05/11/2023 Negative  Negative, Invalid Final    Influenza virus B, PCR 05/11/2023 Negative  Negative, Invalid Final    RSV, PCR 05/11/2023 Negative  Negative, Invalid Final    POC Group A Strep, PCR 05/11/2023 Not Detected  Not Detected, Invalid Final-Edited   Hospital Outpatient Visit on 04/25/2023   Component Date Value Ref Range Status    25-Hydroxy   Vitamin D 25 04/25/2023 37  30 - 100 ng/mL Final    TSH 04/25/2023 0.030 (L)  0.380 - 5.330 uIU/mL Final    Free T-4 04/25/2023 1.63  0.93 - 1.70 ng/dL Final    T3,Free 04/25/2023 2.87  2.00 - 4.40 pg/mL Final          Assessment and Plan:   The following treatment plan was discussed.  Signs and symptoms for which to return were discussed with patient at length.  Patient verbalized understanding.    1. Severe episode of recurrent major depressive disorder, without psychotic features (HCC)  sertraline (ZOLOFT) 50 MG Tab      2. Hashimoto's thyroiditis        3. Ovarian mass, right        4. URI, acute  POCT Cepheid CoV-2, Flu A/B, RSV - PCR    POCT Cepheid Group A Strep - PCR      5. RTI (respiratory tract infection)  promethazine-dextromethorphan (PROMETHAZINE-DM) 6.25-15 MG/5ML syrup    ibuprofen (MOTRIN) 600 MG Tab    azithromycin (ZITHROMAX) 250 MG Tab          1.  Chronic and unstable: Continue sertraline 50 mg    2.  Chronic and stable: Continue levothyroxine 100 mcg daily    3.  Chronic and stable: Patient had follow-up with pelvic MRI and ablation done by gynecology    5.  New and unstable: All viral testing including RSV, COVID, strep and flu were negative.  I will cover with azithromycin for atypical bacterial superinfection we did discuss is likely started as viral etiology.  If symptoms worsen she will need a chest x-ray    Followup: Within a week if symptoms are worsening    Please note that this dictation was created using voice recognition software. I have made every reasonable  attempt to correct obvious errors, but I expect that there are errors of grammar and possibly content that I did not discover before finalizing the note.

## 2023-05-23 ENCOUNTER — TELEPHONE (OUTPATIENT)
Dept: HEALTH INFORMATION MANAGEMENT | Facility: OTHER | Age: 38
End: 2023-05-23

## 2023-08-01 ENCOUNTER — HOSPITAL ENCOUNTER (OUTPATIENT)
Dept: LAB | Facility: MEDICAL CENTER | Age: 38
End: 2023-08-01
Attending: INTERNAL MEDICINE
Payer: MEDICARE

## 2023-08-01 LAB
25(OH)D3 SERPL-MCNC: 37 NG/ML (ref 30–100)
T3FREE SERPL-MCNC: 2.84 PG/ML (ref 2–4.4)
T4 FREE SERPL-MCNC: 1.33 NG/DL (ref 0.93–1.7)
TSH SERPL DL<=0.005 MIU/L-ACNC: 0.02 UIU/ML (ref 0.38–5.33)

## 2023-08-01 PROCEDURE — 84439 ASSAY OF FREE THYROXINE: CPT

## 2023-08-01 PROCEDURE — 84481 FREE ASSAY (FT-3): CPT

## 2023-08-01 PROCEDURE — 82306 VITAMIN D 25 HYDROXY: CPT

## 2023-08-01 PROCEDURE — 84443 ASSAY THYROID STIM HORMONE: CPT

## 2023-08-01 PROCEDURE — 36415 COLL VENOUS BLD VENIPUNCTURE: CPT

## 2023-09-26 ENCOUNTER — OFFICE VISIT (OUTPATIENT)
Dept: MEDICAL GROUP | Facility: MEDICAL CENTER | Age: 38
End: 2023-09-26
Payer: MEDICARE

## 2023-09-26 VITALS
WEIGHT: 203.6 LBS | RESPIRATION RATE: 20 BRPM | OXYGEN SATURATION: 96 % | BODY MASS INDEX: 33.92 KG/M2 | SYSTOLIC BLOOD PRESSURE: 118 MMHG | TEMPERATURE: 98.2 F | HEIGHT: 65 IN | HEART RATE: 72 BPM | DIASTOLIC BLOOD PRESSURE: 76 MMHG

## 2023-09-26 DIAGNOSIS — R79.89 ABNORMAL CBC: ICD-10-CM

## 2023-09-26 DIAGNOSIS — Z86.39 HISTORY OF ELEVATED GLUCOSE: ICD-10-CM

## 2023-09-26 DIAGNOSIS — N93.9 VAGINAL BLEEDING: ICD-10-CM

## 2023-09-26 DIAGNOSIS — R40.0 DAYTIME SLEEPINESS: ICD-10-CM

## 2023-09-26 DIAGNOSIS — E78.00 ELEVATED CHOLESTEROL: ICD-10-CM

## 2023-09-26 DIAGNOSIS — E06.3 HASHIMOTO'S THYROIDITIS: ICD-10-CM

## 2023-09-26 DIAGNOSIS — F33.2 SEVERE EPISODE OF RECURRENT MAJOR DEPRESSIVE DISORDER, WITHOUT PSYCHOTIC FEATURES (HCC): ICD-10-CM

## 2023-09-26 DIAGNOSIS — Z83.438 FAMILY HISTORY OF HYPERLIPIDEMIA: ICD-10-CM

## 2023-09-26 PROCEDURE — 3074F SYST BP LT 130 MM HG: CPT | Performed by: PHYSICIAN ASSISTANT

## 2023-09-26 PROCEDURE — 99214 OFFICE O/P EST MOD 30 MIN: CPT | Performed by: PHYSICIAN ASSISTANT

## 2023-09-26 PROCEDURE — 3078F DIAST BP <80 MM HG: CPT | Performed by: PHYSICIAN ASSISTANT

## 2023-09-26 RX ORDER — SERTRALINE HYDROCHLORIDE 100 MG/1
100 TABLET, FILM COATED ORAL DAILY
Qty: 90 TABLET | Refills: 3 | Status: SHIPPED | OUTPATIENT
Start: 2023-09-26

## 2023-09-26 ASSESSMENT — FIBROSIS 4 INDEX: FIB4 SCORE: 0.77

## 2023-09-26 NOTE — ASSESSMENT & PLAN NOTE
Patient significant vaginal bleeding this year.  Patient had ablation on April 11, 2023.  Did improve symptoms

## 2023-09-26 NOTE — ASSESSMENT & PLAN NOTE
Chronic and unstable  Increasing dose of sertraline from 50 mg to 75 mg and after few weeks having her start 100 mg daily

## 2023-09-26 NOTE — ASSESSMENT & PLAN NOTE
Chronic and unstable  TSH is low at 0.020 but normal T4 functioning and normal T3.  Suggesting that she does not need an increase in dose of medication.  New care with Dr. Casas at [facility].  Currently she takes a dose of 175 mcg 5 days a week and 350 mcg 2 days a week for total of 1575 mcg.  Previously, at the dose patient was most comfortable with, she was taking 224 mcg 7 days a week which was 1568 mcg.  Therefore her dosing really has not changed much.

## 2023-09-26 NOTE — PROGRESS NOTES
Subjective:   Annamaria Babin is a 38 y.o. female here today for     HPI: Patient is here to discuss:  Problem   Vaginal Bleeding    Patient significant vaginal bleeding this year.  Patient had ablation on April 11, 2023.  Did improve symptoms     Severe Episode of Recurrent Major Depressive Disorder, Without Psychotic Features (Hcc)    5/11/2023: Patient had been on bupropion and Prozac 10 mg in the past but did not feel they worked and did not like how she felt on them.  Has been taking sertraline 25 mg for some time approximately 6 months.  Feels it is working but would like to increase the dose.    September 26, 2023: Taking sertraline 50 mg daily but feels still significant sadness.  Feels it is helped but would like to increase dose     Hashimoto's Thyroiditis          Current medicines (including changes today)  Current Outpatient Medications   Medication Sig Dispense Refill    sertraline (ZOLOFT) 100 MG Tab Take 1 Tablet by mouth every day. 90 Tablet 3    ibuprofen (MOTRIN) 600 MG Tab Take 1 Tablet by mouth every 6 hours as needed for Fever. 30 Tablet 0    levothyroxine (SYNTHROID) 175 MCG Tab Take 1 Tablet by mouth every morning on an empty stomach. 30 Tablet 3    ferrous gluconate (FERGON) 324 (38 Fe) MG Tab TAKE 1 TABLET BY MOUTH EVERY DAY 90 Tablet 1    montelukast (SINGULAIR) 10 MG Tab Take 1 Tablet by mouth every day. 90 Tablet 3    cetirizine (ZYRTEC) 10 MG Tab Take 1 Tablet by mouth 2 times a day. 180 Tablet 3    hydrOXYzine HCl (ATARAX) 10 MG Tab TAKE 1 TABLET BY MOUTH THREE TIMES A DAY AS NEEDED 270 Tablet 1    albuterol 108 (90 Base) MCG/ACT Aero Soln inhalation aerosol INHALE 2 PUFFS BY MOUTH EVERY 6 HOURS AS NEEDED FOR SHORTNESS OF BREATH 8.5 Each 6    famotidine (PEPCID) 20 MG Tab Take 1 Tab by mouth 2 times a day. 60 Tab 11    acyclovir (ZOVIRAX) 5 % Ointment APPLY TO COLD SORE 4 TIMES DAILY 30 g 0     No current facility-administered medications for this visit.     She  has a past medical  "history of Anemia, Anesthesia, Arthritis, Asthma, Enlarged liver, Gynecological disorder, H/O thyroidectomy (11/14/2013), Hashimoto disease, History of back surgery (11/14/2013), History of strabismus (11/14/2013), Indigestion, Liver disease, Mastocytosis, PONV (postoperative nausea and vomiting), Psychiatric problem, Renal disorder, S/P cholecystectomy (11/14/2013), and S/P inguinal hernia repair (11/14/2013).  Imuran [azathioprine sodium], Sulfasalazine, Plaquenil [hydroxychloroquine sulfate], Vicodin [hydrocodone-acetaminophen], Other drug, and Other misc     Social History and Family History were reviewed and updated.    ROS   No headaches, chest pain, no shortness of breath, abdominal pain, nausea, or vomiting.  All other systems were reviewed and are negative or noted as positive in the HPI.       Objective:     /76   Pulse 72   Temp 36.8 °C (98.2 °F) (Temporal)   Resp 20   Ht 1.651 m (5' 5\")   Wt 92.4 kg (203 lb 9.6 oz)   SpO2 96%  Body mass index is 33.88 kg/m².     Physical Exam:  General: Patient appears well-nourished, well-hydrated, nontoxic  HEENT, normocephalic atraumatic, PERRLA, extraocular movements intact, nares are patent and clear  Neck: No visible masses, thyromegaly or abnormalities noted  Cardiovascular.  Sitting comfortably without visible signs of edema  Lungs: No cyanosis noted, nondyspneic  Skin: Well perfused without evidence of rash or lesions  Neurological: Cranial nerves II through XII intact, normal gait  Musculoskeletal: Normal range of motion, normal strength and no deficit noted     Clinical Course/Lab Analysis:        Assessment and Plan:   The following treatment plan was discussed.  Signs and symptoms for which to return were discussed with patient at length.  Patient verbalized understanding.    Problem List Items Addressed This Visit       Hashimoto's thyroiditis     Chronic and unstable  TSH is low at 0.020 but normal T4 functioning and normal T3.  Suggesting that " she does not need an increase in dose of medication.  New care with Dr. Casas at [facility].  Currently she takes a dose of 175 mcg 5 days a week and 350 mcg 2 days a week for total of 1575 mcg.  Previously, at the dose patient was most comfortable with, she was taking 224 mcg 7 days a week which was 1568 mcg.  Therefore her dosing really has not changed much.         Relevant Orders    TSH WITH REFLEX TO FT4    Severe episode of recurrent major depressive disorder, without psychotic features (HCC)     Chronic and unstable  Increasing dose of sertraline from 50 mg to 75 mg and after few weeks having her start 100 mg daily         Relevant Medications    sertraline (ZOLOFT) 100 MG Tab    Other Relevant Orders    Referral to Psychiatry    Vaginal bleeding     Patient significant vaginal bleeding this year.  Patient had ablation on April 11, 2023.  Did improve symptoms          Other Visit Diagnoses       Abnormal CBC        Relevant Orders    CBC WITH DIFFERENTIAL    Daytime sleepiness        Relevant Orders    Referral to Pulmonary and Sleep Medicine    History of elevated glucose        Relevant Orders    Comp Metabolic Panel    HEMOGLOBIN A1C    Family history of hyperlipidemia        Elevated cholesterol        Relevant Orders    Lipid Profile               Followup: 3 months for annual    Please note that this dictation was created using voice recognition software. I have made every reasonable attempt to correct obvious errors, but I expect that there are errors of grammar and possibly content that I did not discover before finalizing the note.

## 2023-10-05 ENCOUNTER — TELEPHONE (OUTPATIENT)
Dept: HEALTH INFORMATION MANAGEMENT | Facility: OTHER | Age: 38
End: 2023-10-05
Payer: MEDICARE

## 2023-10-26 ENCOUNTER — HOSPITAL ENCOUNTER (OUTPATIENT)
Dept: LAB | Facility: MEDICAL CENTER | Age: 38
End: 2023-10-26
Attending: PHYSICIAN ASSISTANT
Payer: MEDICARE

## 2023-10-26 DIAGNOSIS — E78.00 ELEVATED CHOLESTEROL: ICD-10-CM

## 2023-10-26 DIAGNOSIS — E06.3 HASHIMOTO'S THYROIDITIS: ICD-10-CM

## 2023-10-26 DIAGNOSIS — R79.89 ABNORMAL CBC: ICD-10-CM

## 2023-10-26 DIAGNOSIS — Z86.39 HISTORY OF ELEVATED GLUCOSE: ICD-10-CM

## 2023-10-26 LAB
ALBUMIN SERPL BCP-MCNC: 4.9 G/DL (ref 3.2–4.9)
ALBUMIN/GLOB SERPL: 1.8 G/DL
ALP SERPL-CCNC: 121 U/L (ref 30–99)
ALT SERPL-CCNC: 19 U/L (ref 2–50)
ANION GAP SERPL CALC-SCNC: 11 MMOL/L (ref 7–16)
AST SERPL-CCNC: 24 U/L (ref 12–45)
BASOPHILS # BLD AUTO: 0.4 % (ref 0–1.8)
BASOPHILS # BLD: 0.02 K/UL (ref 0–0.12)
BILIRUB SERPL-MCNC: 0.8 MG/DL (ref 0.1–1.5)
BUN SERPL-MCNC: 10 MG/DL (ref 8–22)
CALCIUM ALBUM COR SERPL-MCNC: 8.5 MG/DL (ref 8.5–10.5)
CALCIUM SERPL-MCNC: 9.2 MG/DL (ref 8.5–10.5)
CHLORIDE SERPL-SCNC: 106 MMOL/L (ref 96–112)
CHOLEST SERPL-MCNC: 182 MG/DL (ref 100–199)
CO2 SERPL-SCNC: 25 MMOL/L (ref 20–33)
CREAT SERPL-MCNC: 0.77 MG/DL (ref 0.5–1.4)
EOSINOPHIL # BLD AUTO: 0.08 K/UL (ref 0–0.51)
EOSINOPHIL NFR BLD: 1.4 % (ref 0–6.9)
ERYTHROCYTE [DISTWIDTH] IN BLOOD BY AUTOMATED COUNT: 38.7 FL (ref 35.9–50)
EST. AVERAGE GLUCOSE BLD GHB EST-MCNC: 97 MG/DL
FASTING STATUS PATIENT QL REPORTED: NORMAL
GFR SERPLBLD CREATININE-BSD FMLA CKD-EPI: 100 ML/MIN/1.73 M 2
GLOBULIN SER CALC-MCNC: 2.8 G/DL (ref 1.9–3.5)
GLUCOSE SERPL-MCNC: 90 MG/DL (ref 65–99)
HBA1C MFR BLD: 5 % (ref 4–5.6)
HCT VFR BLD AUTO: 45.8 % (ref 37–47)
HDLC SERPL-MCNC: 45 MG/DL
HGB BLD-MCNC: 15.1 G/DL (ref 12–16)
IMM GRANULOCYTES # BLD AUTO: 0.01 K/UL (ref 0–0.11)
IMM GRANULOCYTES NFR BLD AUTO: 0.2 % (ref 0–0.9)
LDLC SERPL CALC-MCNC: 120 MG/DL
LYMPHOCYTES # BLD AUTO: 1.85 K/UL (ref 1–4.8)
LYMPHOCYTES NFR BLD: 32.6 % (ref 22–41)
MCH RBC QN AUTO: 28 PG (ref 27–33)
MCHC RBC AUTO-ENTMCNC: 33 G/DL (ref 32.2–35.5)
MCV RBC AUTO: 85 FL (ref 81.4–97.8)
MONOCYTES # BLD AUTO: 0.43 K/UL (ref 0–0.85)
MONOCYTES NFR BLD AUTO: 7.6 % (ref 0–13.4)
NEUTROPHILS # BLD AUTO: 3.28 K/UL (ref 1.82–7.42)
NEUTROPHILS NFR BLD: 57.8 % (ref 44–72)
NRBC # BLD AUTO: 0 K/UL
NRBC BLD-RTO: 0 /100 WBC (ref 0–0.2)
PLATELET # BLD AUTO: 269 K/UL (ref 164–446)
PMV BLD AUTO: 10.8 FL (ref 9–12.9)
POTASSIUM SERPL-SCNC: 4 MMOL/L (ref 3.6–5.5)
PROT SERPL-MCNC: 7.7 G/DL (ref 6–8.2)
RBC # BLD AUTO: 5.39 M/UL (ref 4.2–5.4)
SODIUM SERPL-SCNC: 142 MMOL/L (ref 135–145)
T4 FREE SERPL-MCNC: 1.97 NG/DL (ref 0.93–1.7)
TRIGL SERPL-MCNC: 86 MG/DL (ref 0–149)
TSH SERPL DL<=0.005 MIU/L-ACNC: 0.03 UIU/ML (ref 0.38–5.33)
WBC # BLD AUTO: 5.7 K/UL (ref 4.8–10.8)

## 2023-10-26 PROCEDURE — 85025 COMPLETE CBC W/AUTO DIFF WBC: CPT

## 2023-10-26 PROCEDURE — 80053 COMPREHEN METABOLIC PANEL: CPT

## 2023-10-26 PROCEDURE — 84443 ASSAY THYROID STIM HORMONE: CPT

## 2023-10-26 PROCEDURE — 84439 ASSAY OF FREE THYROXINE: CPT

## 2023-10-26 PROCEDURE — 80061 LIPID PANEL: CPT

## 2023-10-26 PROCEDURE — 83036 HEMOGLOBIN GLYCOSYLATED A1C: CPT

## 2023-10-26 PROCEDURE — 36415 COLL VENOUS BLD VENIPUNCTURE: CPT

## 2023-11-07 ENCOUNTER — HOSPITAL ENCOUNTER (OUTPATIENT)
Dept: LAB | Facility: MEDICAL CENTER | Age: 38
End: 2023-11-07
Attending: INTERNAL MEDICINE
Payer: MEDICARE

## 2023-11-07 LAB
25(OH)D3 SERPL-MCNC: 27 NG/ML (ref 30–100)
T3FREE SERPL-MCNC: 3.1 PG/ML (ref 2–4.4)
T4 FREE SERPL-MCNC: 1.4 NG/DL (ref 0.93–1.7)
TSH SERPL DL<=0.005 MIU/L-ACNC: 0.06 UIU/ML (ref 0.38–5.33)

## 2023-11-07 PROCEDURE — 84439 ASSAY OF FREE THYROXINE: CPT

## 2023-11-07 PROCEDURE — 36415 COLL VENOUS BLD VENIPUNCTURE: CPT

## 2023-11-07 PROCEDURE — 82306 VITAMIN D 25 HYDROXY: CPT

## 2023-11-07 PROCEDURE — 84443 ASSAY THYROID STIM HORMONE: CPT

## 2023-11-07 PROCEDURE — 84481 FREE ASSAY (FT-3): CPT

## 2023-11-10 DIAGNOSIS — E06.3 HASHIMOTO'S THYROIDITIS: ICD-10-CM

## 2023-11-10 RX ORDER — LEVOTHYROXINE SODIUM 175 MCG
TABLET ORAL
Qty: 90 TABLET | Refills: 1 | Status: SHIPPED | OUTPATIENT
Start: 2023-11-10

## 2023-11-10 NOTE — TELEPHONE ENCOUNTER
Received request via: Pharmacy    Was the patient seen in the last year in this department? Yes    Does the patient have an active prescription (recently filled or refills available) for medication(s) requested? yes    Does the patient have shelter Plus and need 100 day supply (blood pressure, diabetes and cholesterol meds only)? Patient does not have SCP   Requested Prescriptions     Pending Prescriptions Disp Refills    SYNTHROID 175 MCG Tab [Pharmacy Med Name: SYNTHROID 175 MCG TABLET] 90 Tablet 3     Sig: TAKE 1 TABLET BY MOUTH EVERY DAY IN THE MORNING ON EMPTY STOMACH FOR 90 DAYS

## 2023-11-17 ENCOUNTER — OFFICE VISIT (OUTPATIENT)
Dept: MEDICAL GROUP | Facility: MEDICAL CENTER | Age: 38
End: 2023-11-17
Payer: MEDICARE

## 2023-11-17 VITALS
RESPIRATION RATE: 16 BRPM | HEIGHT: 65 IN | SYSTOLIC BLOOD PRESSURE: 120 MMHG | BODY MASS INDEX: 33.32 KG/M2 | DIASTOLIC BLOOD PRESSURE: 70 MMHG | OXYGEN SATURATION: 97 % | TEMPERATURE: 98.1 F | WEIGHT: 200 LBS | HEART RATE: 81 BPM

## 2023-11-17 DIAGNOSIS — N89.8 VAGINAL DISCHARGE: ICD-10-CM

## 2023-11-17 DIAGNOSIS — J45.20 MILD INTERMITTENT ASTHMA WITHOUT COMPLICATION: ICD-10-CM

## 2023-11-17 DIAGNOSIS — R05.1 ACUTE COUGH: ICD-10-CM

## 2023-11-17 LAB
FLUAV RNA SPEC QL NAA+PROBE: NEGATIVE
FLUBV RNA SPEC QL NAA+PROBE: NEGATIVE
RSV RNA SPEC QL NAA+PROBE: NEGATIVE
SARS-COV-2 RNA RESP QL NAA+PROBE: NEGATIVE

## 2023-11-17 PROCEDURE — 3078F DIAST BP <80 MM HG: CPT | Performed by: FAMILY MEDICINE

## 2023-11-17 PROCEDURE — 3074F SYST BP LT 130 MM HG: CPT | Performed by: FAMILY MEDICINE

## 2023-11-17 PROCEDURE — 0241U POCT CEPHEID COV-2, FLU A/B, RSV - PCR: CPT | Performed by: FAMILY MEDICINE

## 2023-11-17 PROCEDURE — 99214 OFFICE O/P EST MOD 30 MIN: CPT | Performed by: FAMILY MEDICINE

## 2023-11-17 RX ORDER — AZITHROMYCIN 250 MG/1
TABLET, FILM COATED ORAL
Qty: 6 TABLET | Refills: 0 | Status: SHIPPED | OUTPATIENT
Start: 2023-11-17 | End: 2023-11-22

## 2023-11-17 RX ORDER — FLUCONAZOLE 150 MG/1
150 TABLET ORAL
Qty: 2 TABLET | Refills: 0 | Status: SHIPPED | OUTPATIENT
Start: 2023-11-17 | End: 2023-11-21

## 2023-11-17 RX ORDER — METHYLPREDNISOLONE 4 MG/1
TABLET ORAL
Qty: 21 TABLET | Refills: 0 | Status: SHIPPED | OUTPATIENT
Start: 2023-11-17 | End: 2024-02-01 | Stop reason: SDUPTHER

## 2023-11-17 ASSESSMENT — ENCOUNTER SYMPTOMS
WHEEZING: 0
CHILLS: 1
COUGH: 1
FEVER: 0
BLOOD IN STOOL: 0
SHORTNESS OF BREATH: 0
NAUSEA: 0
CONSTIPATION: 0
ABDOMINAL PAIN: 0
VOMITING: 0
PALPITATIONS: 0
SPUTUM PRODUCTION: 1

## 2023-11-17 ASSESSMENT — FIBROSIS 4 INDEX: FIB4 SCORE: 0.8

## 2023-11-17 NOTE — PROGRESS NOTES
FAMILY MEDICINE VISIT                                                               Chief complaint::Diagnoses of Mild intermittent asthma without complication, Acute cough, and Vaginal discharge were pertinent to this visit.    History of present illness: Annamaria Babin is a 38 y.o. female who presented for cough.    Symptoms started on Sunday with cough.  Cough is productive with green mucus coming.  She is having headache, chills, fatigue.  No diarrhea, nausea, vomiting symptoms.  Her son was sick with viral illness and pinkeye.    She has history of asthma, on albuterol inhaler as needed.  She reported she has not been using albuterol inhaler as that causes worsening of headache.    She has not checked for COVID test at home.    She has history of mast cell disease.    Review of systems:     Review of Systems   Constitutional:  Positive for chills and malaise/fatigue. Negative for fever.   HENT:  Positive for congestion. Negative for ear discharge and ear pain.    Respiratory:  Positive for cough and sputum production. Negative for shortness of breath and wheezing.    Cardiovascular:  Negative for chest pain, palpitations and leg swelling.   Gastrointestinal:  Negative for abdominal pain, blood in stool, constipation, nausea and vomiting.        Medications and Allergies:     Current Outpatient Medications   Medication Sig Dispense Refill    azithromycin (ZITHROMAX) 250 MG Tab Take 2 Tablets by mouth every day for 1 day, THEN 1 Tablet every day for 4 days. 6 Tablet 0    methylPREDNISolone (MEDROL DOSEPAK) 4 MG Tablet Therapy Pack As directed on the packaging label. 21 Tablet 0    fluconazole (DIFLUCAN) 150 MG tablet Take 1 Tablet by mouth every 72 hours for 2 doses. 2 Tablet 0    SYNTHROID 175 MCG Tab TAKE 1 TABLET BY MOUTH EVERY DAY IN THE MORNING ON EMPTY STOMACH FOR 90 DAYS 90 Tablet 1    sertraline (ZOLOFT) 100 MG Tab Take 1 Tablet by mouth every day. 90 Tablet 3    ibuprofen (MOTRIN) 600 MG Tab Take 1  "Tablet by mouth every 6 hours as needed for Fever. 30 Tablet 0    ferrous gluconate (FERGON) 324 (38 Fe) MG Tab TAKE 1 TABLET BY MOUTH EVERY DAY 90 Tablet 1    montelukast (SINGULAIR) 10 MG Tab Take 1 Tablet by mouth every day. 90 Tablet 3    cetirizine (ZYRTEC) 10 MG Tab Take 1 Tablet by mouth 2 times a day. 180 Tablet 3    hydrOXYzine HCl (ATARAX) 10 MG Tab TAKE 1 TABLET BY MOUTH THREE TIMES A DAY AS NEEDED 270 Tablet 1    albuterol 108 (90 Base) MCG/ACT Aero Soln inhalation aerosol INHALE 2 PUFFS BY MOUTH EVERY 6 HOURS AS NEEDED FOR SHORTNESS OF BREATH 8.5 Each 6    famotidine (PEPCID) 20 MG Tab Take 1 Tab by mouth 2 times a day. 60 Tab 11    acyclovir (ZOVIRAX) 5 % Ointment APPLY TO COLD SORE 4 TIMES DAILY 30 g 0     No current facility-administered medications for this visit.          Vitals:    /70   Pulse 81   Temp 36.7 °C (98.1 °F)   Resp 16   Ht 1.651 m (5' 5\")   Wt 90.7 kg (200 lb)   SpO2 97%  Body mass index is 33.28 kg/m².    Physical Exam:     Physical Exam  Constitutional:       Appearance: Normal appearance. She is well-developed and well-groomed.   HENT:      Head: Normocephalic and atraumatic.      Right Ear: Tympanic membrane is bulging.      Left Ear: Tympanic membrane is bulging.   Eyes:      General:         Right eye: No discharge.         Left eye: No discharge.      Conjunctiva/sclera: Conjunctivae normal.   Cardiovascular:      Rate and Rhythm: Normal rate and regular rhythm.      Heart sounds: Normal heart sounds. No murmur heard.     No friction rub. No gallop.   Pulmonary:      Effort: Pulmonary effort is normal. No respiratory distress.      Breath sounds: Wheezing and rhonchi present. No rales.   Neurological:      General: No focal deficit present.      Mental Status: She is alert. Mental status is at baseline.      Gait: Gait is intact.   Psychiatric:         Mood and Affect: Mood and affect normal.         Behavior: Behavior normal.            Assessment/Plan:       1. " Acute cough  New problem, unstable, COVID, flu and RSV test came back negative.  Start Z-Kwesi and Medrol Dosepak as symptoms has been going almost for a week and not getting better.  She has history of asthma.  Continue supportive measures.  - POCT CEPHEID COV-2, FLU A/B, RSV - PCR  - azithromycin (ZITHROMAX) 250 MG Tab; Take 2 Tablets by mouth every day for 1 day, THEN 1 Tablet every day for 4 days.  Dispense: 6 Tablet; Refill: 0  - methylPREDNISolone (MEDROL DOSEPAK) 4 MG Tablet Therapy Pack; As directed on the packaging label.  Dispense: 21 Tablet; Refill: 0    2. Mild intermittent asthma without complication  Chronic problem, unstable currently due to URI.  Start Medrol Dosepak    3. Vaginal discharge  New problem, she reports that she usually gets fungal infection after starting antibiotic, prescribed Diflucan.    - fluconazole (DIFLUCAN) 150 MG tablet; Take 1 Tablet by mouth every 72 hours for 2 doses.  Dispense: 2 Tablet; Refill: 0   Please note that this dictation was created using voice recognition software. I have made every reasonable attempt to correct obvious errors, but I expect that there are errors of grammar and possibly content that I did not discover before finalizing the note.    Follow up as needed if symptoms are not getting better.

## 2023-11-21 ENCOUNTER — PATIENT MESSAGE (OUTPATIENT)
Dept: MEDICAL GROUP | Facility: MEDICAL CENTER | Age: 38
End: 2023-11-21
Payer: MEDICARE

## 2023-11-21 DIAGNOSIS — E55.9 VITAMIN D INSUFFICIENCY: ICD-10-CM

## 2023-11-21 RX ORDER — ERGOCALCIFEROL 1.25 MG/1
CAPSULE ORAL
Qty: 12 CAPSULE | Refills: 1 | Status: SHIPPED | OUTPATIENT
Start: 2023-11-21

## 2023-11-22 NOTE — PROGRESS NOTES
Patient requesting vitamin D 50,000 international units once weekly.  I sent in a prescription for her request

## 2024-02-01 ENCOUNTER — HOSPITAL ENCOUNTER (OUTPATIENT)
Dept: LAB | Facility: MEDICAL CENTER | Age: 39
End: 2024-02-01
Attending: PHYSICIAN ASSISTANT
Payer: MEDICARE

## 2024-02-01 ENCOUNTER — OFFICE VISIT (OUTPATIENT)
Dept: MEDICAL GROUP | Facility: MEDICAL CENTER | Age: 39
End: 2024-02-01
Payer: MEDICARE

## 2024-02-01 VITALS
DIASTOLIC BLOOD PRESSURE: 72 MMHG | HEART RATE: 76 BPM | OXYGEN SATURATION: 97 % | WEIGHT: 212 LBS | BODY MASS INDEX: 35.32 KG/M2 | TEMPERATURE: 98.3 F | SYSTOLIC BLOOD PRESSURE: 116 MMHG | HEIGHT: 65 IN

## 2024-02-01 DIAGNOSIS — F33.2 SEVERE EPISODE OF RECURRENT MAJOR DEPRESSIVE DISORDER, WITHOUT PSYCHOTIC FEATURES (HCC): ICD-10-CM

## 2024-02-01 DIAGNOSIS — M46.1 BILATERAL SACROILIITIS (HCC): ICD-10-CM

## 2024-02-01 DIAGNOSIS — R05.1 ACUTE COUGH: ICD-10-CM

## 2024-02-01 DIAGNOSIS — E66.9 OBESITY (BMI 30-39.9): ICD-10-CM

## 2024-02-01 LAB
CRP SERPL HS-MCNC: 0.35 MG/DL (ref 0–0.75)
ERYTHROCYTE [SEDIMENTATION RATE] IN BLOOD BY WESTERGREN METHOD: 7 MM/HOUR (ref 0–25)

## 2024-02-01 PROCEDURE — 99214 OFFICE O/P EST MOD 30 MIN: CPT | Performed by: PHYSICIAN ASSISTANT

## 2024-02-01 PROCEDURE — 3078F DIAST BP <80 MM HG: CPT | Performed by: PHYSICIAN ASSISTANT

## 2024-02-01 PROCEDURE — 86140 C-REACTIVE PROTEIN: CPT

## 2024-02-01 PROCEDURE — 85652 RBC SED RATE AUTOMATED: CPT

## 2024-02-01 PROCEDURE — 86812 HLA TYPING A B OR C: CPT

## 2024-02-01 PROCEDURE — 3074F SYST BP LT 130 MM HG: CPT | Performed by: PHYSICIAN ASSISTANT

## 2024-02-01 PROCEDURE — 36415 COLL VENOUS BLD VENIPUNCTURE: CPT

## 2024-02-01 RX ORDER — CYCLOBENZAPRINE HCL 5 MG
TABLET ORAL
Qty: 20 TABLET | Refills: 0 | Status: SHIPPED | OUTPATIENT
Start: 2024-02-01

## 2024-02-01 RX ORDER — METHYLPREDNISOLONE 4 MG/1
TABLET ORAL
Qty: 21 TABLET | Refills: 0 | Status: SHIPPED | OUTPATIENT
Start: 2024-02-01

## 2024-02-01 ASSESSMENT — FIBROSIS 4 INDEX: FIB4 SCORE: 0.8
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
## 2024-02-02 NOTE — ASSESSMENT & PLAN NOTE
Chronic and unstable  She has done well with different types of injections including injections into the facets, nerve blocks and steroid injections.  I am going to send her to our physiatry group who does this.  Will also recommend some anti-inflammatories and possible muscle relaxer in the evening

## 2024-02-03 LAB — HLA-B27 QL FC: NEGATIVE

## 2024-02-06 ENCOUNTER — OFFICE VISIT (OUTPATIENT)
Dept: PHYSICAL MEDICINE AND REHAB | Facility: MEDICAL CENTER | Age: 39
End: 2024-02-06
Payer: MEDICARE

## 2024-02-06 VITALS
SYSTOLIC BLOOD PRESSURE: 128 MMHG | HEIGHT: 64 IN | BODY MASS INDEX: 36.7 KG/M2 | TEMPERATURE: 98.3 F | WEIGHT: 215 LBS | DIASTOLIC BLOOD PRESSURE: 78 MMHG | HEART RATE: 71 BPM | OXYGEN SATURATION: 98 %

## 2024-02-06 DIAGNOSIS — E66.9 OBESITY (BMI 30-39.9): ICD-10-CM

## 2024-02-06 DIAGNOSIS — M47.816 FACET ARTHRITIS OF LUMBAR REGION: ICD-10-CM

## 2024-02-06 DIAGNOSIS — F33.2 SEVERE EPISODE OF RECURRENT MAJOR DEPRESSIVE DISORDER, WITHOUT PSYCHOTIC FEATURES (HCC): ICD-10-CM

## 2024-02-06 DIAGNOSIS — Z71.3 DIETARY COUNSELING: ICD-10-CM

## 2024-02-06 DIAGNOSIS — E55.9 VITAMIN D DEFICIENCY: ICD-10-CM

## 2024-02-06 DIAGNOSIS — Z13.31 POSITIVE DEPRESSION SCREENING: ICD-10-CM

## 2024-02-06 DIAGNOSIS — Z71.82 EXERCISE COUNSELING: ICD-10-CM

## 2024-02-06 DIAGNOSIS — M47.9 SPONDYLOSIS: ICD-10-CM

## 2024-02-06 PROCEDURE — 99204 OFFICE O/P NEW MOD 45 MIN: CPT | Performed by: GENERAL PRACTICE

## 2024-02-06 PROCEDURE — 1125F AMNT PAIN NOTED PAIN PRSNT: CPT | Performed by: GENERAL PRACTICE

## 2024-02-06 PROCEDURE — 3078F DIAST BP <80 MM HG: CPT | Performed by: GENERAL PRACTICE

## 2024-02-06 PROCEDURE — 3074F SYST BP LT 130 MM HG: CPT | Performed by: GENERAL PRACTICE

## 2024-02-06 RX ORDER — GABAPENTIN 100 MG/1
100-300 CAPSULE ORAL NIGHTLY PRN
Qty: 90 CAPSULE | Refills: 3 | Status: SHIPPED | OUTPATIENT
Start: 2024-02-06

## 2024-02-06 ASSESSMENT — PATIENT HEALTH QUESTIONNAIRE - PHQ9: CLINICAL INTERPRETATION OF PHQ2 SCORE: 0

## 2024-02-06 ASSESSMENT — FIBROSIS 4 INDEX: FIB4 SCORE: 0.8

## 2024-02-06 ASSESSMENT — PAIN SCALES - GENERAL: PAINLEVEL: 5=MODERATE PAIN

## 2024-02-06 NOTE — PATIENT INSTRUCTIONS
Pain control:   -Tylenol/acetaminophen 975mg (for example, 3 tabs of 325mg, or 2 tabs of 500mg) every 6-8 hours as needed for mild/moderate pain.  Do not take more than 3000mg of Tylenol in 24 hours.   -gabapentin  -stop flexeril  -voltaren  NSAIDS for severe pain  -Aleve/Naproxen 500 mg once or take 250 mg twice a day for 14 days.  Then follow by 250 to 500 mg every 12 hours as needed or 250 mg every 6 to 8 hours; maximum dose: 1000 mg per day and then as needed. Take with milk or with food. do not take with other NSAIDs such as diclofenac, ibuprofen, Celebrex, diclofenac  -You may also try ice packs or heating pads to help with pain for no more than 15 minutes at a time    Uptitrate the Gabapentin as follows if tolerating side effects:  Day 1-7: 100mg at bedtime every night  Day 8-14: 200mg at bedtime every night  Day 15 and afterwards: 300mg at bedtime every night  After 21 days or so and If tolerated, may consider taking during the day.     Do not increase gabapentin any faster to minimize potential side effects. A possible side effect are dizziness, drowsiness, respiratory depression, hypotension, agitation emotional liability. while your body is adjusting to the medicine. If you experience any unwanted side effects, decrease the gabapentin to the previous dose that you did not have side effects on. Gabapentin is a relatively slow acting medicine so you may not feel immediate relief.  For chronic use, an adequate trial with gabapentin may require 2 months or more.  increase dose weekly based on response and tolerability to a target dose range of 300 mg to 1.2 g 3 times daily        DICLOFENAC GEL PLAN     Voltaren Gel / Diclofenac skin gel   Topical antiinflammatory:    Diclofenac (topical 1% gel)    - 2 g applied TOPICALLY to upper extremities 4 times daily as needed   - MAX 8 g/day to any single joint of upper extremity   - 4 g applied TOPICALLY to lower extremities 4 times daily as needed   -- MAX 16 g/day  "to any single joint of lower extremity     No more than 32 g/day total over all affected joints daily        Brand Names: Voltaren Gel     What is this medicine?   DICLOFENAC (dye KLNICOLE fen ak) is a non-steroidal anti-inflammatory drug (NSAID). The 1% skin gel is used to treat osteoarthritis of the hands or knees. The 3% skin gel is used to treat actinic keratosis.   Voltaren gel is a topical medication. There is much less systemic absorption. This is some research from a liver toxicity journal in 2017     \"Topical forms of diclofenac (solutions, gels, creams, patches) have been associated with only a low rate of serum enzyme elevations (generally less than 1%) that may be no greater than occurs with placebo or vehicle application. However, product labels for topical diclofenac mention the possibility of liver injury and at least one case of clinically apparent liver injury attributed to topical diclofenac has been reported in the literature. Nevertheless, clinically apparent liver injury due to topical forms of diclofenac must be exceedingly rare.\"     LiverTox: Clinical and Research Information on Drug-Induced Liver Injury [Internet]. [place] National [place] [employer] and [employer]estive and Kidney Diseases; 2012-. Diclofenac. [Updated 2017 Dec 13]. Available from: https://www.ncbi.nlm.nih.gov/books/INV520248/       How should I use this medicine?   This medicine is for external use only. Follow the directions on the prescription label. Wash hands before and after use. Do not get this medicine in your eyes. If you do, rinse out with plenty of cool tap water. Use your doses at regular intervals. Do not use your medicine more often than directed.     A special MedGuide will be given to you by the pharmacist with each prescription and refill of the 1% gel. Be sure to read this information carefully each time.     Talk to your pediatrician regarding the use of this medicine in children. Special care may be " needed. The 3% gel is not approved for use in children.       What side effects may I notice from receiving this medicine?     Side effects that you should report to your doctor or health care professional as soon as possible:     -allergic reactions like skin rash, itching or hives, swelling of the face, lips, or tongue   -black or bloody stools, blood in the urine or vomit   -blurred vision   -chest pain   -difficulty breathing or wheezing   -nausea or vomiting   -redness, blistering, peeling or loosening of the skin, including inside the mouth   -slurred speech or weakness on one side of the body   -trouble passing urine or change in the amount of urine   -unexplained weight gain or swelling   -unusually weak or tired   -yellowing of eyes or skin     Side effects that usually do not require medical attention (report to your doctor or health care professional if they continue or are bothersome):     -dizziness   -dry skin   -headache   -heartburn   -increased sensitivity to the sun   -stomach pain   -tingling at the application site     What may interact with this medicine?   -aspirin   -NSAIDs, medicines for pain and inflammation, like ibuprofen or naproxen   -Do not use any other skin products without telling your doctor or health care professional.     What if I miss a dose?   -If you miss a dose, use it as soon as you can. If it is almost time for your next dose, use only that dose. Do not use double or extra doses.     Where should I keep my medicine?   -Keep out of the reach of children.   -Store the 1% gel at room temperature between 15 and 30 degrees C (59 and 86 degrees F). Store the 3% gel at room temperature between 20 and 25 degrees C (68 and 77 degrees F). -Protect from light. Throw away any unused medicine after the expiration date.     What should I tell my health care provider before I take this medicine?   They need to know if you have any of these conditions:   -asthma   -bleeding problems    -coronary artery bypass graft (CABG) surgery within the past 2 weeks   -heart disease   -high blood pressure   -if you frequently drink alcohol containing drinks   -kidney disease   -liver disease   -open or infected skin   -stomach problems   -an unusual or allergic reaction to diclofenac, aspirin, other NSAIDs, other medicines, benzyl alcohol (3% gel only), foods, dyes, or preservatives   -pregnant or trying to get pregnant   -breast-feeding     What should I watch for while using this medicine?   -Tell your doctor or healthcare professional if your symptoms do not start to get better or if they get worse. You will need to follow up with your health care provider to monitor your progress. You may need to be treated for up to 3 months if you are using the 3% gel, but the full effect may not occur until 1 month after stopping treatment. If you develop a severe skin reaction, contact your doctor or health care professional immediately.   -This medicine can make you more sensitive to the sun. Keep out of the sun. If you cannot avoid being in the sun, wear protective clothing and use sunscreen. Do not use sun lamps or tanning beds/booths.   -Do not take medicines such as ibuprofen and naproxen with this medicine. Side effects such as stomach upset, nausea, or ulcers may be more likely to occur. Many medicines available without a prescription should not be taken with this medicine.   -This medicine does not prevent heart attack or stroke. In fact, this medicine may increase the chance of a heart attack or stroke. The chance may increase with longer use of this medicine and in people who have heart disease. If you take aspirin to prevent heart attack or stroke, talk with your doctor or health care professional.   -This medicine can cause ulcers and bleeding in the stomach and intestines at any time during treatment. Do not smoke cigarettes or drink alcohol. These increase irritation to your stomach and can make it more  "susceptible to damage from this medicine. Ulcers and bleeding can happen without warning symptoms and can cause death.   -You may get drowsy or dizzy. Do not drive, use machinery, or do anything that needs mental alertness until you know how this medicine affects you. Do not stand or sit up quickly, especially if you are an older patient. This reduces the risk of dizzy or fainting spells.   -This medicine can cause you to bleed more easily. Try to avoid damage to your teeth and gums when you brush or floss your teeth.     NOTE:This sheet is a summary. It may not cover all possible information. If you have questions about this medicine, talk to your doctor, pharmacist, or health care provider. Copyright© 2019 [employer]            Diet  \"-Emphasizes fruits, vegetables, whole grains, and fat-free or low-fat milk and milk products  -Includes a variety of protein foods such as seafood, lean meats and poultry, eggs, legumes (beans and peas), soy products, nuts, and seeds.  -Is low in added sugars, sodium, saturated fats, trans fats, and cholesterol.  -Stays within your daily calorie needs\"  https://www.cdc.gov/healthyweight/healthy_eating/index.html    Exercise  \"We know 150 minutes of physical activity each week sounds like a lot, but you don’t have to do it all at once. It could be 30 minutes a day, 5 days a week. You can spread your activity out during the week and break it up into smaller chunks of time.\"  https://www.cdc.gov/physicalactivity/basics/adults/index.htm  \"Exercise, multidisciplinary rehabilitation, acupuncture, CBT, and mind-body practices were most consistently associated with durable slight to moderate improvements in function and pain for specific chronic pain conditions. \"  https://effectivehealthcare.ahr[place].gov/products/nonpharma-treatment-pain/research-2018       "

## 2024-02-06 NOTE — PROGRESS NOTES
"Physiatry (Physical Medicine and  Rehabilitation)       Patient Name: Annamaria Babin   Patient : 1985  Patient Age: 38 y.o.   PCP: Sarah Wolf P.A.-C.  MRN: [id number]     Date of service: See epic    Chief complaint:   Chief Complaint   Patient presents with    New Patient     Sacroiliitis Bilateral         Referring provider: Sarah Wolf P.A.-C.    HISTORY    Annamaria Babin is a 38 y.o. year old female with past medical history of obesity, vitamin D deficiency, major depressive disorder, postoperative hypothyroidism, migraines, iron deficiency anemia, multiple gynecological procedures, who presents with a referral for bilateral sacroiliitis      Medical records review:  I reviewed the note from the referring provider Sarah Wolf P.A.-C. including the note dated 24.  -Prescribed Flexeril 5 mg, Medrol Dosepak; ordered HLA-B27, sed rate, CRP    Prior Procedures:   2012 RFA Dr Yung villarreal Facet injections  Epidurals in the past not effective dr yung villarrael    HPI:    Acute on chronic hip pain. Onset since .  Pain level 5/10 located midline described as aching worse with standing, walking, bending forward and backward, twisting, walking up and down stairs, coughing and sneezing.  Relieved with laying down.  No incontinence or saddle anesthesia or incoordination repeat.  Difficulty in the mornings with dressing herself.  Takes 3 hours to \"loosen up back\".  Current medications edibles and aleve. Not tolerant of pain control but did not pickup medrol and flexeril. Physical therapy about 20 years ago.    Reported associated swollen joints of hands and toes.   Reported instability in ankle.     ROS:   Red Flags ROS:   Fever, Chills, Sweats: Denies  Involuntary Weight Loss: Denies  Bladder Incontinence: Denies  Bowel Incontinence: denies  Saddle Anesthesia: Denies  Falls: denies  progressive motor weakness: denies  All other systems reviewed and negative.     GOALS OF TREATMENT: " Symptom/Pain relief. improve function.        PMHx:   Past Medical History:   Diagnosis Date    Anemia     iron deficiency    Anesthesia     PONV    Arthritis     bilat sacroliitis/hips    Asthma     prn inhalers    Enlarged liver     Gynecological disorder     abnormal uterine bleeding    H/O thyroidectomy 11/14/2013 2010 for goitre.     Hashimoto disease     History of back surgery 11/14/2013    Has had about 5 minimally invasive procedures low back and both hips as SI crest area.      History of strabismus 11/14/2013    Has surgery both eyes for it 1997.      Indigestion     Liver disease     non viral hepatitis    Mastocytosis     PONV (postoperative nausea and vomiting)     Psychiatric problem     Renal disorder     chronic kidney disease-blood in urine, thin basement membrance    S/P cholecystectomy 11/14/2013    S/P inguinal hernia repair 11/14/2013    Left side.        PSHx:   Past Surgical History:   Procedure Laterality Date    IRRIGATION & DEBRIDEMENT GENERAL Right 8/22/2022    Procedure: IRRIGATION AND DEBRIDEMENT,RIGHT ANKLE WOUND;  Surgeon: Vernon Gaspar M.D.;  Location: [facility];  Service: Orthopedics    EXOSTOSIS EXCISION Right 6/24/2022    Procedure: REMOVAL RIGHT FIBULA EXOSTOSIS;  Surgeon: Vernon Gaspar M.D.;  Location: [facility];  Service: Orthopedics    DEBRIDEMENT Right 6/24/2022    Procedure: DEBRIDEMENT RIGHT FIBULA;  Surgeon: Vernon Gaspar M.D.;  Location: [facility];  Service: Orthopedics    PELVISCOPY  03/06/2019    Procedure: PELVISCOPY- EUA;  Surgeon: Alesia Krishnamurthy M.D.;  Location: SURGERY SAME DAY [facility];  Service: Gynecology    SALPINGECTOMY Bilateral 03/06/2019    Procedure: SALPINGECTOMY;  Surgeon: Alesia Krishnamurthy M.D.;  Location: SURGERY SAME DAY [facility];  Service: Gynecology    OVARIAN CYSTECTOMY Right 03/06/2019    Procedure: OVARIAN CYSTECTOMY;  Surgeon: Alesia Krishnamurthy M.D.;  Location: SURGERY SAME  DAY [facility];  Service: Gynecology    HYSTEROSCOPY WITH MYOSURE N/A 03/06/2019    Procedure: HYSTEROSCOPY WITH MYOSURE-DIAGNOSTIC AND OPERATIVE, EXCISION OF POLYP/FIBROIDS, ENDOMETRIAL CURRETTAGE;  Surgeon: Alesia Krishnamurthy M.D.;  Location: SURGERY SAME DAY [facility];  Service: Gynecology    TUBAL LIGATION  2019    THYROIDECTOMY  2013    CHOLECYSTECTOMY  2011    INGUINAL HERNIA REPAIR  2002    ORIF, ANKLE Right 1997    STRABISMUS REPAIR  1996    TONSILLECTOMY  1995    OTHER      ears       Family history   Family History   Problem Relation Age of Onset    Hypertension Mother     Diabetes Mother     GI Disease Mother         hep c    Other Mother         arterial occlusion    Cancer Mother         unknown. ?lung, metastasis    Heart Disease Father     Heart Failure Father     Hypertension Father     Diabetes Father     Other Maternal Aunt         arterial occlusion       Medications: reviewed on epic.   Outpatient Medications Marked as Taking for the 2/6/24 encounter (Office Visit) with Luigi Noguera D.O.   Medication Sig Dispense Refill    gabapentin (NEURONTIN) 100 MG Cap Take 1-3 Capsules by mouth at bedtime as needed (pain). 90 Capsule 3    cyclobenzaprine (FLEXERIL) 5 mg tablet Take 1 to 2 tablets by mouth in the evening as needed for back muscle spasm 20 Tablet 0    methylPREDNISolone (MEDROL DOSEPAK) 4 MG Tablet Therapy Pack As directed on the packaging label. 21 Tablet 0    vitamin D2, Ergocalciferol, (DRISDOL) 1.25 MG (13227 UT) Cap capsule Take 1 tablet by mouth every 7 days 12 Capsule 1    SYNTHROID 175 MCG Tab TAKE 1 TABLET BY MOUTH EVERY DAY IN THE MORNING ON EMPTY STOMACH FOR 90 DAYS 90 Tablet 1    sertraline (ZOLOFT) 100 MG Tab Take 1 Tablet by mouth every day. 90 Tablet 3    ibuprofen (MOTRIN) 600 MG Tab Take 1 Tablet by mouth every 6 hours as needed for Fever. 30 Tablet 0    ferrous gluconate (FERGON) 324 (38 Fe) MG Tab TAKE 1 TABLET BY MOUTH EVERY DAY 90 Tablet 1    montelukast  "(SINGULAIR) 10 MG Tab Take 1 Tablet by mouth every day. 90 Tablet 3    cetirizine (ZYRTEC) 10 MG Tab Take 1 Tablet by mouth 2 times a day. 180 Tablet 3    hydrOXYzine HCl (ATARAX) 10 MG Tab TAKE 1 TABLET BY MOUTH THREE TIMES A DAY AS NEEDED 270 Tablet 1    albuterol 108 (90 Base) MCG/ACT Aero Soln inhalation aerosol INHALE 2 PUFFS BY MOUTH EVERY 6 HOURS AS NEEDED FOR SHORTNESS OF BREATH 8.5 Each 6    famotidine (PEPCID) 20 MG Tab Take 1 Tab by mouth 2 times a day. 60 Tab 11    acyclovir (ZOVIRAX) 5 % Ointment APPLY TO COLD SORE 4 TIMES DAILY 30 g 0        Allergies:   Allergies   Allergen Reactions    Imuran [Azathioprine Sodium] Anaphylaxis     Liver enzymes rise significantly    Sulfasalazine Hives    Plaquenil [Hydroxychloroquine Sulfate] Hives    Vicodin [Hydrocodone-Acetaminophen] Itching    Other Drug Hives     \"Iv contrast\"    Other Misc Itching     tape       Social Hx:   Social History     Socioeconomic History    Marital status: Single     Spouse name: Not on file    Number of children: Not on file    Years of education: Not on file    Highest education level: Not on file   Occupational History    Not on file   Tobacco Use    Smoking status: Never    Smokeless tobacco: Never   Vaping Use    Vaping Use: Never used   Substance and Sexual Activity    Alcohol use: Yes     Alcohol/week: 0.0 - 0.6 oz     Comment: Rarely    Drug use: Not Currently     Types: Marijuana    Sexual activity: Yes     Partners: Male     Birth control/protection: Condom     Comment: , 3 kids and one nephew   Other Topics Concern    Not on file   Social History Narrative    Retired, 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 Used to work for      Social Determinants of Health     Financial Resource Strain: Not on file   Food Insecurity: Not on file   Transportation Needs: Not on file   Physical Activity: Not on file   Stress: Not on file   Social Connections: Not on file   Intimate Partner Violence: Not on file   Housing Stability: Not " "on file         EXAMINATION   Vitals: /78 (BP Location: Right arm, Patient Position: Sitting, BP Cuff Size: Adult)   Pulse 71   Temp 36.8 °C (98.3 °F) (Temporal)   Ht 1.626 m (5' 4\")   Wt 97.5 kg (215 lb)   SpO2 98%   Physical Exam:     A chaperone was offered to the patient during today's exam. Chaperone name: Perla was present.      Body Habitus: Body mass index is 36.9 kg/m².  Appearance: Well-groomed, well-nourished, not disheveled  Eyes: No scleral icterus to suggest severe liver disease, no proptosis to suggest severe hyperthyroid  ENT -no obvious auditory deficits, no external lesions, moist mucus membranes   Skin -no rashes or lesions noted. No appreciable skin breakdown on exposed skin areas.    Respiratory-  breathing comfortably on room air, no audible wheezing, full sentences  Cardiovascular- No lower extremity edema noted.   Psychiatric- alert and oriented, anxious, comfortable, cooperative     Musculoskeletal and Neuro:  Gait and station - normal gait with reciprocal pattern,  no presence/use of ambulatory device, no arm assistance with sit-to-stand, nonantalgic. no loss of balance during exam.  No change in patient's demeanor with exam.    Grossly normal cranial nerve exam and sensory exam grossly  Coordination grossly intact  Single leg balance / Trendelenburg:  normal in balance and control      Thoracic/Lumbar Spine/Sacral Spine/Hips   Inspection: No evidence of atrophy in bilateral lower extremities throughout     ROM: full  active range of motion with flexion, lateral flexion, and rotation bilaterally.   There is full  active range of motion with lumbar extension without pain.    There is no pain with facet loading maneuver (extension rotation) with axial low back pain on the BILATERAL side(s)    Palpation:   No tenderness to palpation in midline at T1-T12 levels. No tenderness to palpation in the left and right of the midline T1-L5  palpation over SI joint: negative " bilaterally  palpation in hip or over the gluteus medius tendon insertion: negative bilaterally     Lumbar spine Special tests  Neuro tension  Straight leg test} negative bilaterally     HIP  FAIR test negative right, positive left   Range of motion in the RIGHT hip is full  in flexion, extension, abduction, internal rotation, external rotation.  Range of motion in the LEFT hip is full  in flexion, extension, abduction, internal rotation, external rotation.  Elisabeth negative right, positive left  Hamstring tightness with forward flexion  Finger-to-Floor Distance in maximal forward flexion >6 inches      SI joint tests  Observation patient sits on one buttocks: Negative  SI joint compression negative bilaterally    SI joint distraction negative bilaterally  Thigh thrust test negative bilaterally   ELISABETH test negative right, positive left    Key points for the international standards for neurological classification of spinal cord injury (ISNCSCI) to light touch.   Dermatome R L   L2 2 2   L3 2 2   L4 2 2   L5 2 2   S1 2 2     Motor Exam Lower Extremities  ? Myotome R L   Hip flexion L2 4+ 5   Knee extension L3 5 5   Ankle dorsiflexion L4 5 5   Toe extension L5 5 5   Ankle plantarflexion S1 5 5   Hip Abduction  4 4   Hip Adduction  4 4     Reflexes  Clonus of the ankle negative bilaterally   ? R L   Patella 2+ 2+     MEDICAL DECISION MAKING    Medical records review: see under HPI section.     DATA    Labs:   Lab Results   Component Value Date/Time    SODIUM 142 10/26/2023 09:40 AM    POTASSIUM 4.0 10/26/2023 09:40 AM    CHLORIDE 106 10/26/2023 09:40 AM    CO2 25 10/26/2023 09:40 AM    ANION 11.0 10/26/2023 09:40 AM    GLUCOSE 90 10/26/2023 09:40 AM    BUN 10 10/26/2023 09:40 AM    CREATININE 0.77 10/26/2023 09:40 AM    CALCIUM 9.2 10/26/2023 09:40 AM    ASTSGOT 24 10/26/2023 09:40 AM    ALTSGPT 19 10/26/2023 09:40 AM    TBILIRUBIN 0.8 10/26/2023 09:40 AM    ALBUMIN 4.9 10/26/2023 09:40 AM    TOTPROTEIN 7.7 10/26/2023  09:40 AM    GLOBULIN 2.8 10/26/2023 09:40 AM    AGRATIO 1.8 10/26/2023 09:40 AM   ]    Lab Results   Component Value Date/Time    PROTHROMBTM 12.5 10/18/2012 09:37 AM    INR 0.92 10/18/2012 09:37 AM        Lab Results   Component Value Date/Time    WBC 5.7 10/26/2023 09:40 AM    RBC 5.39 10/26/2023 09:40 AM    HEMOGLOBIN 15.1 10/26/2023 09:40 AM    HEMATOCRIT 45.8 10/26/2023 09:40 AM    MCV 85.0 10/26/2023 09:40 AM    MCH 28.0 10/26/2023 09:40 AM    MCHC 33.0 10/26/2023 09:40 AM    MPV 10.8 10/26/2023 09:40 AM    NEUTSPOLYS 57.80 10/26/2023 09:40 AM    LYMPHOCYTES 32.60 10/26/2023 09:40 AM    MONOCYTES 7.60 10/26/2023 09:40 AM    EOSINOPHILS 1.40 10/26/2023 09:40 AM    BASOPHILS 0.40 10/26/2023 09:40 AM    HYPOCHROMIA 1+ 08/24/2022 02:22 PM    ANISOCYTOSIS 1+ 08/31/2022 09:15 AM        Lab Results   Component Value Date/Time    HBA1C 5.0 10/26/2023 09:40 AM        Imaging:   I personally reviewed following images, these are my reads  MRI pelvis 12/5/2022: Degenerative changes of lumbosacral region, minimal disc space between L5 and S1, mild facet arthropathy lumbosacral  Sacral XR 2013: mild R SI     IMAGING radiology reads. I reviewed the following radiology reads      Results for orders placed during the hospital encounter of 11/19/12    QU-[id number]-K/O    Impression  1. Variant pancreatic ductal anatomy with pancreatic divisum.  2. Normal biliary tree.  3. Hepatosplenomegaly without focal abnormalities.                                    Results for orders placed during the hospital encounter of 12/05/22    MR-PELVIS-WITH & W/O AND SEQUENCES    Impression  Multiple cysts in the right ovary. No discrete mass identified.                          Results for orders placed during the hospital encounter of 06/24/22    DX-ANKLE 3+ VIEWS RIGHT    Impression  Intraoperative fluoroscopy spot images as described above.      Results for orders placed during the hospital encounter of 09/09/22    DX-CHEST-2  VIEWS    Impression  1.  No acute cardiopulmonary process.  2.  Left-sided PICC line in place with its tip in the inferior aspect of the right atrium. Retraction by 6 cm should be considered.          Results for orders placed during the hospital encounter of 10/09/07    DX-FOREARM    Impression  IMPRESSION:    NORMAL LEFT FOREARM.    ELIDA/adry        Read By ELIE MORA MD on Oct  9 2007  4:06PM  : ADRY Transcription Date: Oct 10 2007  9:01AM  THIS DOCUMENT HAS BEEN ELECTRONICALLY SIGNED BY: ELIE MORA MD on  Oct 11 2007  8:19AM    Results for orders placed during the hospital encounter of 06/02/18    DX-HAND 3+ RIGHT    Impression  Amorphous soft tissue calcification or ossification versus old fracture fragment adjacent to the trapezium.                    Results for orders placed during the hospital encounter of 10/09/13    DX-SACROILIAC JOINTS 3+    Impression  1. Mild sclerosis involving the left sacroiliac joint.              INTERPRETING LOCATION:  [address] [place] [address] [place], [address]          Results for orders placed during the hospital encounter of 10/09/07    DX-WRIST-COMPLETE 3+    Impression  IMPRESSION:    NORMAL LEFT WRIST SERIES.         Diagnosis   Visit Diagnoses     ICD-10-CM   1. Facet arthritis of lumbar region  M47.816   2. Spondylosis  M47.9   3. Obesity (BMI 30-39.9)  E66.9   4. Exercise counseling  Z71.82   5. Dietary counseling  Z71.3   6. Vitamin D deficiency  E55.9   7. Iron deficiency anemia due to chronic blood loss  D50.0   8. Severe episode of recurrent major depressive disorder, without psychotic features (HCC)  F33.2   9. Positive depression screening  Z13.31       ASSESSMENT AND PLAN:  Annamaria Babin 38 y.o. female  Patient with historical and clinical evidence consistent with chronic low back pain unspecified.  Normal sed rate, CRP, and HLA-B27.  X-rays revealed narrowing to space between L5-S1 with facet arthropathy.  Midline back pain per patient but  not reproducible on exam.  Will start the patient on gabapentin 100 mg taper upwards, consider Tylenol over-the-counter, and continue over-the-counter naproxen.  Will refer patient to physical therapy and behavioral health.  Prior success with radiofrequency ablation with Dr. Zhong approximately 10 years ago. differential includes spondylosis, facet arthropathy, pelvic floor dysfunction (OB/GYN surgeries)  Patient has not started Medrol Dosepak and informed her to discuss with PCP for other concerning joint pains and swelling  Abnormal TSH: Continue management plan with PCP  vitamin D deficiency: Vitamin D level 27 and recommend repeat vitamin D lab  History of major depressive disorder : PHQ9 + and place behavioral health referral which patient agreed to  Dietary and exercising counseling discussed  Extensive discussion regarding treatment options, at this time recommending the following:    Orders Placed This Encounter    DX-LUMBAR SPINE-2 OR 3 VIEWS    Referral to Physical Therapy    Referral to Behavioral Health    gabapentin (NEURONTIN) 100 MG Cap         PLAN  I did have an extensive discussion regarding pathology and treatment options with the patient today including medications, injections, physical agents (eg. water, heat, cold, TENS), therapy-based programs (eg. massage, stretching/traction, exercise), alternative modalities (eg. Acupuncture, OMT, chiropractor, etc), and lastly surgical intervention:  -Medications/Modalities:   -Tylenol/acetaminophen 975mg (for example, 3 tabs of 325mg, or 2 tabs of 500mg) every 6-8 hours as needed for mild/moderate pain.  Do not take more than 3000mg of Tylenol in 24 hours.   -gabapentin 100mg taper  -stop flexeril  -voltaren  NSAIDS for severe pain  -Aleve/Naproxen 500 mg once or take 250 mg twice a day for 14 days.  Then follow by 250 to 500 mg every 12 hours as needed or 250 mg every 6 to 8 hours; maximum dose: 1000 mg per day and then as needed. Take with milk or with  food. do not take with other NSAIDs such as diclofenac, ibuprofen, Celebrex, diclofenac  -You may also try ice packs or heating pads to help with pain for no more than 15 minutes at a time  -Limitations:    Activity as tolerated  -Brace/orthotic/assistive devices: Not indicated at this time  -Therapy (PT/OT/Aquatherapy): ordered to focus on strengthening and stretching and may include physical agents (eg. water, heat, cold, TENS) and/or therapy-based programs (eg. massage, stretching/traction, exercise),  -Home exercise program: encouraged and demonstrated home exercises of regular strengthening and stretching, such as single leg balance exercises  -Office Injections: not indicated at this time  -Diagnostic workup: reviewed today as above; XR  -Interventional program: I would consider the patient for epidural steroid or sacroiliac steroid injection depending on the results of the above   -Referrals: PT and BH   -Outside records requested:  The patient signed Outside Records Request Form for her outside records including images. This includes the records from [facility]    Follow-up:    Return to clinic in 12 weeks for follow-up of symptomatology, or sooner as needed for any acute issues.  Patient expressed understanding of the management plan. Patient (and Family Members) was/were encouraged to call if any worries, issues, problems or concerns prior to the next visit     Please note that this dictation was created using voice recognition software. I have made every reasonable attempt to correct obvious errors but there may be errors of grammar and content that I may have overlooked prior to finalization of this note.      Luigi oNguera,   Physical Medicine and Rehabilitation  [facility]         ADAM Wolf P.A.-C.   Sarah Cuadra P.A.-C.

## 2024-02-07 ENCOUNTER — HOSPITAL ENCOUNTER (OUTPATIENT)
Dept: RADIOLOGY | Facility: MEDICAL CENTER | Age: 39
End: 2024-02-07
Attending: GENERAL PRACTICE
Payer: MEDICARE

## 2024-02-07 DIAGNOSIS — M47.9 SPONDYLOSIS: ICD-10-CM

## 2024-02-07 DIAGNOSIS — M47.816 FACET ARTHRITIS OF LUMBAR REGION: ICD-10-CM

## 2024-02-07 PROCEDURE — 72100 X-RAY EXAM L-S SPINE 2/3 VWS: CPT

## 2024-02-10 NOTE — RESULT ENCOUNTER NOTE
Dear Annamaria Babin    I reviewed the results of your test.  There are no urgent findings that require urgent intervention.  We will discuss the results in detail at the follow-up visit.    Luigi Noguera DO

## 2024-02-15 DIAGNOSIS — M47.816 FACET ARTHRITIS OF LUMBAR REGION: ICD-10-CM

## 2024-02-15 DIAGNOSIS — M47.9 SPONDYLOSIS: ICD-10-CM

## 2024-02-15 NOTE — PROGRESS NOTES
Patient requesting second opinion from pain management.  Requesting referral to advanced pain specialist in [place].  I put in that referral this morning

## 2024-03-21 ENCOUNTER — TELEPHONE (OUTPATIENT)
Dept: HEALTH INFORMATION MANAGEMENT | Facility: OTHER | Age: 39
End: 2024-03-21

## 2024-04-05 ENCOUNTER — HOSPITAL ENCOUNTER (OUTPATIENT)
Dept: RADIOLOGY | Facility: MEDICAL CENTER | Age: 39
End: 2024-04-05
Attending: STUDENT IN AN ORGANIZED HEALTH CARE EDUCATION/TRAINING PROGRAM
Payer: MEDICARE

## 2024-04-05 DIAGNOSIS — M54.50 LOW BACK PAIN, UNSPECIFIED BACK PAIN LATERALITY, UNSPECIFIED CHRONICITY, UNSPECIFIED WHETHER SCIATICA PRESENT: ICD-10-CM

## 2024-04-05 PROCEDURE — 72148 MRI LUMBAR SPINE W/O DYE: CPT

## 2024-05-05 DIAGNOSIS — E06.3 HASHIMOTO'S THYROIDITIS: ICD-10-CM

## 2024-05-06 DIAGNOSIS — E06.3 HASHIMOTO'S THYROIDITIS: ICD-10-CM

## 2024-05-06 RX ORDER — LEVOTHYROXINE SODIUM 175 MCG
TABLET ORAL
Qty: 90 TABLET | Refills: 1 | Status: SHIPPED | OUTPATIENT
Start: 2024-05-06

## 2024-05-07 ENCOUNTER — HOSPITAL ENCOUNTER (OUTPATIENT)
Dept: LAB | Facility: MEDICAL CENTER | Age: 39
End: 2024-05-07
Attending: INTERNAL MEDICINE
Payer: MEDICARE

## 2024-05-07 LAB
25(OH)D3 SERPL-MCNC: 47 NG/ML (ref 30–100)
T3FREE SERPL-MCNC: 3.77 PG/ML (ref 2–4.4)
T4 FREE SERPL-MCNC: 2.27 NG/DL (ref 0.93–1.7)
TSH SERPL DL<=0.005 MIU/L-ACNC: <0.005 UIU/ML (ref 0.38–5.33)

## 2024-05-08 ENCOUNTER — OFFICE VISIT (OUTPATIENT)
Dept: SLEEP MEDICINE | Facility: MEDICAL CENTER | Age: 39
End: 2024-05-08
Attending: PHYSICIAN ASSISTANT
Payer: MEDICARE

## 2024-05-08 ENCOUNTER — HOSPITAL ENCOUNTER (OUTPATIENT)
Dept: RADIOLOGY | Facility: MEDICAL CENTER | Age: 39
End: 2024-05-08
Attending: GENERAL PRACTICE
Payer: MEDICARE

## 2024-05-08 ENCOUNTER — APPOINTMENT (OUTPATIENT)
Dept: PHYSICAL MEDICINE AND REHAB | Facility: MEDICAL CENTER | Age: 39
End: 2024-05-08
Payer: MEDICARE

## 2024-05-08 VITALS
HEART RATE: 84 BPM | RESPIRATION RATE: 16 BRPM | WEIGHT: 202 LBS | DIASTOLIC BLOOD PRESSURE: 74 MMHG | BODY MASS INDEX: 34.49 KG/M2 | HEIGHT: 64 IN | OXYGEN SATURATION: 96 % | SYSTOLIC BLOOD PRESSURE: 114 MMHG

## 2024-05-08 VITALS
HEART RATE: 74 BPM | OXYGEN SATURATION: 96 % | SYSTOLIC BLOOD PRESSURE: 120 MMHG | BODY MASS INDEX: 35.82 KG/M2 | HEIGHT: 65 IN | WEIGHT: 215 LBS | DIASTOLIC BLOOD PRESSURE: 68 MMHG | TEMPERATURE: 98.1 F

## 2024-05-08 DIAGNOSIS — J45.20 MILD INTERMITTENT ASTHMA WITHOUT COMPLICATION: ICD-10-CM

## 2024-05-08 DIAGNOSIS — Z86.69 HISTORY OF MIGRAINE HEADACHES: ICD-10-CM

## 2024-05-08 DIAGNOSIS — M47.9 SPONDYLOSIS: ICD-10-CM

## 2024-05-08 DIAGNOSIS — M79.671 FOOT PAIN, RIGHT: ICD-10-CM

## 2024-05-08 DIAGNOSIS — M19.071 LOCALIZED OSTEOARTHROSIS OF RIGHT ANKLE AND FOOT: ICD-10-CM

## 2024-05-08 DIAGNOSIS — G47.19 EXCESSIVE DAYTIME SLEEPINESS: ICD-10-CM

## 2024-05-08 DIAGNOSIS — M47.816 FACET ARTHRITIS OF LUMBAR REGION: ICD-10-CM

## 2024-05-08 DIAGNOSIS — G47.30 BREATHING-RELATED SLEEP DISORDER: ICD-10-CM

## 2024-05-08 DIAGNOSIS — F51.02 ADJUSTMENT INSOMNIA: ICD-10-CM

## 2024-05-08 PROCEDURE — 3074F SYST BP LT 130 MM HG: CPT | Performed by: GENERAL PRACTICE

## 2024-05-08 PROCEDURE — 3078F DIAST BP <80 MM HG: CPT | Performed by: GENERAL PRACTICE

## 2024-05-08 PROCEDURE — G2211 COMPLEX E/M VISIT ADD ON: HCPCS | Performed by: GENERAL PRACTICE

## 2024-05-08 PROCEDURE — 99215 OFFICE O/P EST HI 40 MIN: CPT | Performed by: GENERAL PRACTICE

## 2024-05-08 PROCEDURE — 1125F AMNT PAIN NOTED PAIN PRSNT: CPT | Performed by: GENERAL PRACTICE

## 2024-05-08 PROCEDURE — 99204 OFFICE O/P NEW MOD 45 MIN: CPT | Performed by: PREVENTIVE MEDICINE

## 2024-05-08 PROCEDURE — 3074F SYST BP LT 130 MM HG: CPT | Performed by: PREVENTIVE MEDICINE

## 2024-05-08 PROCEDURE — 3078F DIAST BP <80 MM HG: CPT | Performed by: PREVENTIVE MEDICINE

## 2024-05-08 RX ORDER — ZOLPIDEM TARTRATE 5 MG/1
5 TABLET ORAL NIGHTLY PRN
Qty: 1 TABLET | Refills: 0 | Status: SHIPPED | OUTPATIENT
Start: 2024-05-08 | End: 2025-05-08

## 2024-05-08 RX ORDER — LEVOTHYROXINE SODIUM 175 MCG
TABLET ORAL
Qty: 90 TABLET | Refills: 1 | Status: SHIPPED | OUTPATIENT
Start: 2024-05-08

## 2024-05-08 ASSESSMENT — PAIN SCALES - GENERAL: PAINLEVEL: 6=MODERATE PAIN

## 2024-05-08 ASSESSMENT — FIBROSIS 4 INDEX
FIB4 SCORE: 0.82
FIB4 SCORE: 0.82

## 2024-05-08 NOTE — PATIENT INSTRUCTIONS
"Gabapentin  Attempted 50mg nightly  A possible side effect are dizziness, drowsiness, respiratory depression, hypotension, agitation emotional liability. while your body is adjusting to the medicine. If you experience any unwanted side effects, decrease the gabapentin to the previous dose that you did not have side effects on. Gabapentin is a relatively slow acting medicine so you may not feel immediate relief.  For chronic use, an adequate trial with gabapentin may require 2 months or more.  increase dose weekly based on response and tolerability to a target dose range of 300 mg to 1.2 g 3 times daily    DICLOFENAC GEL PLAN     Voltaren Gel / Diclofenac skin gel   Topical antiinflammatory:    Diclofenac (topical 1% gel)    - 2 g applied TOPICALLY to upper extremities 4 times daily as needed   - MAX 8 g/day to any single joint of upper extremity   - 4 g applied TOPICALLY to lower extremities 4 times daily as needed   -- MAX 16 g/day to any single joint of lower extremity     No more than 32 g/day total over all affected joints daily      Brand Names: Voltaren Gel     What is this medicine?   DICLOFENAC (dye MARISSA nelson) is a non-steroidal anti-inflammatory drug (NSAID). The 1% skin gel is used to treat osteoarthritis of the hands or knees. The 3% skin gel is used to treat actinic keratosis.   Voltaren gel is a topical medication. There is much less systemic absorption. This is some research from a liver toxicity journal in 2017     \"Topical forms of diclofenac (solutions, gels, creams, patches) have been associated with only a low rate of serum enzyme elevations (generally less than 1%) that may be no greater than occurs with placebo or vehicle application. However, product labels for topical diclofenac mention the possibility of liver injury and at least one case of clinically apparent liver injury attributed to topical diclofenac has been reported in the literature. Nevertheless, clinically apparent liver injury " "due to topical forms of diclofenac must be exceedingly rare.\"     LiverTox: Clinical and Research Information on Drug-Induced Liver Injury [Internet]. Marina HESS): [employer] [place] [employer] and [employer]estive and Kidney Diseases; 2012-. Diclofenac. [Updated 2017 Dec 13]. Available from: https://www.ncbi.nlm.nih.gov/books/LYI089473/       How should I use this medicine?   This medicine is for external use only. Follow the directions on the prescription label. Wash hands before and after use. Do not get this medicine in your eyes. If you do, rinse out with plenty of cool tap water. Use your doses at regular intervals. Do not use your medicine more often than directed.     A special MedGuide will be given to you by the pharmacist with each prescription and refill of the 1% gel. Be sure to read this information carefully each time.     Talk to your pediatrician regarding the use of this medicine in children. Special care may be needed. The 3% gel is not approved for use in children.       What side effects may I notice from receiving this medicine?     Side effects that you should report to your doctor or health care professional as soon as possible:     -allergic reactions like skin rash, itching or hives, swelling of the face, lips, or tongue   -black or bloody stools, blood in the urine or vomit   -blurred vision   -chest pain   -difficulty breathing or wheezing   -nausea or vomiting   -redness, blistering, peeling or loosening of the skin, including inside the mouth   -slurred speech or weakness on one side of the body   -trouble passing urine or change in the amount of urine   -unexplained weight gain or swelling   -unusually weak or tired   -yellowing of eyes or skin     Side effects that usually do not require medical attention (report to your doctor or health care professional if they continue or are bothersome):     -dizziness   -dry skin   -headache   -heartburn   -increased sensitivity to the sun   -stomach " pain   -tingling at the application site     What may interact with this medicine?   -aspirin   -NSAIDs, medicines for pain and inflammation, like ibuprofen or naproxen   -Do not use any other skin products without telling your doctor or health care professional.     What if I miss a dose?   -If you miss a dose, use it as soon as you can. If it is almost time for your next dose, use only that dose. Do not use double or extra doses.     Where should I keep my medicine?   -Keep out of the reach of children.   -Store the 1% gel at room temperature between 15 and 30 degrees C (59 and 86 degrees F). Store the 3% gel at room temperature between 20 and 25 degrees C (68 and 77 degrees F). -Protect from light. Throw away any unused medicine after the expiration date.     What should I tell my health care provider before I take this medicine?   They need to know if you have any of these conditions:   -asthma   -bleeding problems   -coronary artery bypass graft (CABG) surgery within the past 2 weeks   -heart disease   -high blood pressure   -if you frequently drink alcohol containing drinks   -kidney disease   -liver disease   -open or infected skin   -stomach problems   -an unusual or allergic reaction to diclofenac, aspirin, other NSAIDs, other medicines, benzyl alcohol (3% gel only), foods, dyes, or preservatives   -pregnant or trying to get pregnant   -breast-feeding     What should I watch for while using this medicine?   -Tell your doctor or healthcare professional if your symptoms do not start to get better or if they get worse. You will need to follow up with your health care provider to monitor your progress. You may need to be treated for up to 3 months if you are using the 3% gel, but the full effect may not occur until 1 month after stopping treatment. If you develop a severe skin reaction, contact your doctor or health care professional immediately.   -This medicine can make you more sensitive to the sun. Keep out  of the sun. If you cannot avoid being in the sun, wear protective clothing and use sunscreen. Do not use sun lamps or tanning beds/booths.   -Do not take medicines such as ibuprofen and naproxen with this medicine. Side effects such as stomach upset, nausea, or ulcers may be more likely to occur. Many medicines available without a prescription should not be taken with this medicine.   -This medicine does not prevent heart attack or stroke. In fact, this medicine may increase the chance of a heart attack or stroke. The chance may increase with longer use of this medicine and in people who have heart disease. If you take aspirin to prevent heart attack or stroke, talk with your doctor or health care professional.   -This medicine can cause ulcers and bleeding in the stomach and intestines at any time during treatment. Do not smoke cigarettes or drink alcohol. These increase irritation to your stomach and can make it more susceptible to damage from this medicine. Ulcers and bleeding can happen without warning symptoms and can cause death.   -You may get drowsy or dizzy. Do not drive, use machinery, or do anything that needs mental alertness until you know how this medicine affects you. Do not stand or sit up quickly, especially if you are an older patient. This reduces the risk of dizzy or fainting spells.   -This medicine can cause you to bleed more easily. Try to avoid damage to your teeth and gums when you brush or floss your teeth.     NOTE:This sheet is a summary. It may not cover all possible information. If you have questions about this medicine, talk to your doctor, pharmacist, or health care provider. Copyright© 2019 [employer]

## 2024-05-08 NOTE — PROGRESS NOTES
12/26/2017  Maranda Jose is a 47 y.o., female.    Anesthesia Evaluation    I have reviewed the Patient Summary Reports.    I have reviewed the Nursing Notes.   I have reviewed the Medications.     Review of Systems  Anesthesia Hx:  No problems with previous Anesthesia Denies Hx of Anesthetic complications    Social:  Non-Smoker    Cardiovascular:   Denies Hypertension.  Denies MI.  Denies CAD.    Denies CABG/stent.   Denies Angina.    Pulmonary:   Denies COPD.  Denies Asthma.  Denies Recent URI.    Renal/:   Denies Chronic Renal Disease.     Hepatic/GI:   Denies GERD. Denies Liver Disease.    Neurological:   Denies TIA. Denies CVA. Denies Seizures.    Endocrine:   Denies Diabetes. Denies Hypothyroidism.    Psych:   Denies Psychiatric History.          Physical Exam  General:  Morbid Obesity    Airway/Jaw/Neck:  Airway Findings: Mouth Opening: Normal Tongue: Normal  General Airway Assessment: Adult, Good  Mallampati: II  Improves to II with phonation.  TM Distance: 4-6 cm      Dental:  Dental Findings: In tact   Chest/Lungs:  Chest/Lungs Findings: Clear to auscultation, Normal Respiratory Rate     Heart/Vascular:  Heart Findings: Rate: Normal  Rhythm: Regular Rhythm  Sounds: Normal  Heart murmur: negative       Mental Status:  Mental Status Findings:  Cooperative, Alert and Oriented         Anesthesia Plan  Type of Anesthesia, risks & benefits discussed:  Anesthesia Type:  general  Patient's Preference:   Intra-op Monitoring Plan: standard ASA monitors  Intra-op Monitoring Plan Comments:   Post Op Pain Control Plan:   Post Op Pain Control Plan Comments:   Induction:   IV  Beta Blocker:  Patient is not currently on a Beta-Blocker (No further documentation required).       Informed Consent: Patient understands risks and agrees with Anesthesia plan.  Questions answered. Anesthesia consent signed with  Physiatry (Physical Medicine and  Rehabilitation)       Patient Name: Annamaria Babin   Patient : 1985  Patient Age: 38 y.o.   PCP: Sarah Wolf P.A.-C.  MRN: [id number]     Date of service: See epic    Chief complaint:   Chief Complaint   Patient presents with    Follow-Up     3m xray fv        Referring provider: Sarah Wolf P.A.-C.    HISTORY    Annamaria Babin is a 39 y.o. year old female with past medical history of obesity, vitamin D deficiency, major depressive disorder, postoperative hypothyroidism, migraines, iron deficiency anemia, multiple gynecological procedures, who presents with a referral for bilateral sacroiliitis      Medical records review:  I reviewed the note from the referring provider Sarah Wolf P.A.-C. including the note dated 24.  -Prescribed Flexeril 5 mg, Medrol Dosepak; ordered HLA-B27, sed rate, CRP    Prior Procedures:   2012 RFA Dr David villarreal Facet injections  Epidurals in the past not effective dr david villarreal  MBB x2 and pending RFA [facility] Dr Brandon Newman    HPI:    2024   -For your lower back, recently received facet injections with success and pending RFA.    -Currently experiencing paresthesias of her right big toe.  stopped gabapentin over a month because of dizziness.  Pain located at heel.  Pain at big toe.  Was given a boot in  by Garcia after XR.  Planned on follow-up with MRI close not completed.  Onset 2023.  Affecting activities of daily living  -Behavioral health specialist evaluation.  Currently using Aleve and Tylenol and Voltaren gel.  No recent injury, falls, or trauma.  No associated symptoms:  No radiation of pain down BLE, incontinence, progressive weakness, loss of genital sensation, or coordination issues.  The patient  denies any fevers, chills, chest pain or shortness of breath.     24 Acute on chronic hip pain. Onset since .  Pain level 5/10 located midline described as aching worse with  "standing, walking, bending forward and backward, twisting, walking up and down stairs, coughing and sneezing.  Relieved with laying down.  No incontinence or saddle anesthesia or incoordination repeat.  Difficulty in the mornings with dressing herself.  Takes 3 hours to \"loosen up back\".  Current medications edibles and aleve. Not tolerant of pain control but did not pickup medrol and flexeril. Physical therapy about 20 years ago.    Reported associated swollen joints of hands and toes.   Reported instability in ankle.     ROS:   Red Flags ROS:   Fever, Chills, Sweats: Denies  Involuntary Weight Loss: Denies  Bladder Incontinence: Denies  Bowel Incontinence: denies  Saddle Anesthesia: Denies  Falls: denies  progressive motor weakness: denies  All other systems reviewed and negative.     GOALS OF TREATMENT: Symptom/Pain relief. improve function.        PMHx:   Past Medical History:   Diagnosis Date    Anemia     iron deficiency    Anesthesia     PONV    Arthritis     bilat sacroliitis/hips    Asthma     prn inhalers    Daytime sleepiness     Enlarged liver     Gynecological disorder     abnormal uterine bleeding    H/O thyroidectomy 11/14/2013 2010 for goitre.     Hashimoto disease     History of back surgery 11/14/2013    Has had about 5 minimally invasive procedures low back and both hips as SI crest area.      History of strabismus 11/14/2013    Has surgery both eyes for it 1997.      Indigestion     Liver disease     non viral hepatitis    Mastocytosis     Morning headache     PONV (postoperative nausea and vomiting)     Psychiatric problem     Renal disorder     chronic kidney disease-blood in urine, thin basement membrance    S/P cholecystectomy 11/14/2013    S/P inguinal hernia repair 11/14/2013    Left side.        PSHx:   Past Surgical History:   Procedure Laterality Date    IRRIGATION & DEBRIDEMENT GENERAL Right 8/22/2022    Procedure: IRRIGATION AND DEBRIDEMENT,RIGHT ANKLE WOUND;  Surgeon: Vernon ROJAS" patient.  ASA Score: 2     Day of Surgery Review of History & Physical: I have interviewed and examined the patient. I have reviewed the patient's H&P dated:  There are no significant changes.          Ready For Surgery From Anesthesia Perspective.        SILVIA Gaspar;  Location: SURGERY [facility];  Service: Orthopedics    EXOSTOSIS EXCISION Right 6/24/2022    Procedure: REMOVAL RIGHT FIBULA EXOSTOSIS;  Surgeon: Vernon Gaspar M.D.;  Location: SURGERY [facility];  Service: Orthopedics    DEBRIDEMENT Right 6/24/2022    Procedure: DEBRIDEMENT RIGHT FIBULA;  Surgeon: Vernon Gaspar M.D.;  Location: SURGERY [facility];  Service: Orthopedics    PELVISCOPY  03/06/2019    Procedure: PELVISCOPY- EUA;  Surgeon: Alesia Krishnamurthy M.D.;  Location: SURGERY SAME DAY [facility];  Service: Gynecology    SALPINGECTOMY Bilateral 03/06/2019    Procedure: SALPINGECTOMY;  Surgeon: Alesia Krishnamurthy M.D.;  Location: SURGERY SAME DAY [facility];  Service: Gynecology    OVARIAN CYSTECTOMY Right 03/06/2019    Procedure: OVARIAN CYSTECTOMY;  Surgeon: Alesia Krishnamurthy M.D.;  Location: SURGERY SAME DAY [facility];  Service: Gynecology    HYSTEROSCOPY WITH MYOSURE N/A 03/06/2019    Procedure: HYSTEROSCOPY WITH MYOSURE-DIAGNOSTIC AND OPERATIVE, EXCISION OF POLYP/FIBROIDS, ENDOMETRIAL CURRETTAGE;  Surgeon: Alesia Krishnamurthy M.D.;  Location: SURGERY SAME DAY [facility];  Service: Gynecology    TUBAL LIGATION  2019    THYROIDECTOMY  2013    CHOLECYSTECTOMY  2011    INGUINAL HERNIA REPAIR  2002    ORIF, ANKLE Right 1997    STRABISMUS REPAIR  1996    TONSILLECTOMY  1995    OTHER      ears       Family history   Family History   Problem Relation Age of Onset    Hypertension Mother     Diabetes Mother     GI Disease Mother         hep c    Other Mother         arterial occlusion    Cancer Mother         unknown. ?lung, metastasis    Heart Disease Father     Heart Failure Father     Hypertension Father     Diabetes Father     Other Maternal Aunt         arterial occlusion       Medications: reviewed on epic.   Outpatient Medications Marked as Taking for the 5/8/24 encounter (Office Visit) with Luigi Noguera D.O.   Medication Sig Dispense Refill    SYNTHROID 175  "MCG Tab TAKE 1 TABLET BY MOUTH EVERY DAY IN THE MORNING ON EMPTY STOMACH FOR 90 DAYS 90 Tablet 1    methylPREDNISolone (MEDROL DOSEPAK) 4 MG Tablet Therapy Pack As directed on the packaging label. 21 Tablet 0    vitamin D2, Ergocalciferol, (DRISDOL) 1.25 MG (34936 UT) Cap capsule Take 1 tablet by mouth every 7 days 12 Capsule 1    sertraline (ZOLOFT) 100 MG Tab Take 1 Tablet by mouth every day. 90 Tablet 3    ibuprofen (MOTRIN) 600 MG Tab Take 1 Tablet by mouth every 6 hours as needed for Fever. 30 Tablet 0    ferrous gluconate (FERGON) 324 (38 Fe) MG Tab TAKE 1 TABLET BY MOUTH EVERY DAY 90 Tablet 1    montelukast (SINGULAIR) 10 MG Tab Take 1 Tablet by mouth every day. 90 Tablet 3    cetirizine (ZYRTEC) 10 MG Tab Take 1 Tablet by mouth 2 times a day. 180 Tablet 3    hydrOXYzine HCl (ATARAX) 10 MG Tab TAKE 1 TABLET BY MOUTH THREE TIMES A DAY AS NEEDED 270 Tablet 1    albuterol 108 (90 Base) MCG/ACT Aero Soln inhalation aerosol INHALE 2 PUFFS BY MOUTH EVERY 6 HOURS AS NEEDED FOR SHORTNESS OF BREATH 8.5 Each 6    famotidine (PEPCID) 20 MG Tab Take 1 Tab by mouth 2 times a day. 60 Tab 11    acyclovir (ZOVIRAX) 5 % Ointment APPLY TO COLD SORE 4 TIMES DAILY 30 g 0        Allergies:   Allergies   Allergen Reactions    Imuran [Azathioprine Sodium] Anaphylaxis     Liver enzymes rise significantly    Sulfasalazine Hives    Plaquenil [Hydroxychloroquine Sulfate] Hives    Vicodin [Hydrocodone-Acetaminophen] Itching    Other Drug Hives     \"Iv contrast\"    Other Misc Itching     tape       Social Hx:   Social History     Socioeconomic History    Marital status: Single     Spouse name: Not on file    Number of children: Not on file    Years of education: Not on file    Highest education level: Not on file   Occupational History    Not on file   Tobacco Use    Smoking status: Never    Smokeless tobacco: Never   Vaping Use    Vaping Use: Never used   Substance and Sexual Activity    Alcohol use: Yes     Alcohol/week: 0.0 - 0.6 oz " "    Comment: Rarely    Drug use: Not Currently     Types: Marijuana     Comment: Edibles    Sexual activity: Yes     Partners: Male     Birth control/protection: Condom     Comment: , 3 kids and one nephew   Other Topics Concern    Not on file   Social History Narrative    Retired, 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 Used to work for      Social Determinants of Health     Financial Resource Strain: Not on file   Food Insecurity: Not on file   Transportation Needs: Not on file   Physical Activity: Not on file   Stress: Not on file   Social Connections: Not on file   Intimate Partner Violence: Not on file   Housing Stability: Not on file         EXAMINATION   Vitals: /68 (BP Location: Right arm, Patient Position: Sitting, BP Cuff Size: Large adult)   Pulse 74   Temp 36.7 °C (98.1 °F) (Temporal)   Ht 1.645 m (5' 4.75\")   Wt 97.5 kg (215 lb)   SpO2 96%   Physical Exam:     A chaperone was offered to the patient during today's exam. Chaperone name: Perla was present.      Body Habitus: Body mass index is 36.05 kg/m².  Appearance: Well-groomed, well-nourished, not disheveled  Eyes: No scleral icterus to suggest severe liver disease, no proptosis to suggest severe hyperthyroid  ENT -no obvious auditory deficits, no external lesions, moist mucus membranes   Skin -no rashes or lesions noted. No appreciable skin breakdown on exposed skin areas.    Respiratory-  breathing comfortably on room air, no audible wheezing, full sentences  Cardiovascular- No lower extremity edema noted.   Psychiatric- alert and oriented, anxious, comfortable, cooperative     Musculoskeletal and Neuro:  Gait and station - normal gait with reciprocal pattern,  no presence/use of ambulatory device, no arm assistance with sit-to-stand, nonantalgic. no loss of balance during exam.  No change in patient's demeanor with exam.    Grossly normal cranial nerve exam and sensory exam grossly  Coordination grossly intact  Negative slump leg " test  Right wrist: Noted abnormal skin lesion not appearing infected; decreased sensation over radial sensory distribution.  Negative Finkelstein.  Negative Tinel test  Right ankle: Old surgical scar  Bilateral feet: No obvious lesions or skin changes, palpable dorsal pulses, negative squeeze test of the metatarsals, no instability of the ankle ligaments bilaterally    Key points for the international standards for neurological classification of spinal cord injury (ISNCSCI) to light touch.   Dermatome R L   L2 2 2   L3 2 2   L4 2 2   L5 2 2   S1 2 2     Motor Exam Lower Extremities  ? Myotome R L   Hip flexion L2 4+ 5   Knee extension L3 5 5   Ankle dorsiflexion L4 5 5   Toe extension L5 5 5   Ankle plantarflexion S1 5 5   Hip Abduction  4 4   Hip Adduction  4 4     Reflexes  Clonus of the ankle negative bilaterally   ? R L   Patella 2+ 2+     MEDICAL DECISION MAKING    Medical records review: see under HPI section.     DATA    Labs:   Lab Results   Component Value Date/Time    SODIUM 142 10/26/2023 09:40 AM    POTASSIUM 4.0 10/26/2023 09:40 AM    CHLORIDE 106 10/26/2023 09:40 AM    CO2 25 10/26/2023 09:40 AM    ANION 11.0 10/26/2023 09:40 AM    GLUCOSE 90 10/26/2023 09:40 AM    BUN 10 10/26/2023 09:40 AM    CREATININE 0.77 10/26/2023 09:40 AM    CALCIUM 9.2 10/26/2023 09:40 AM    ASTSGOT 24 10/26/2023 09:40 AM    ALTSGPT 19 10/26/2023 09:40 AM    TBILIRUBIN 0.8 10/26/2023 09:40 AM    ALBUMIN 4.9 10/26/2023 09:40 AM    TOTPROTEIN 7.7 10/26/2023 09:40 AM    GLOBULIN 2.8 10/26/2023 09:40 AM    AGRATIO 1.8 10/26/2023 09:40 AM   ]    Lab Results   Component Value Date/Time    PROTHROMBTM 12.5 10/18/2012 09:37 AM    INR 0.92 10/18/2012 09:37 AM        Lab Results   Component Value Date/Time    WBC 5.7 10/26/2023 09:40 AM    RBC 5.39 10/26/2023 09:40 AM    HEMOGLOBIN 15.1 10/26/2023 09:40 AM    HEMATOCRIT 45.8 10/26/2023 09:40 AM    MCV 85.0 10/26/2023 09:40 AM    MCH 28.0 10/26/2023 09:40 AM    MCHC 33.0 10/26/2023 09:40  AM    MPV 10.8 10/26/2023 09:40 AM    NEUTSPOLYS 57.80 10/26/2023 09:40 AM    LYMPHOCYTES 32.60 10/26/2023 09:40 AM    MONOCYTES 7.60 10/26/2023 09:40 AM    EOSINOPHILS 1.40 10/26/2023 09:40 AM    BASOPHILS 0.40 10/26/2023 09:40 AM    HYPOCHROMIA 1+ 08/24/2022 02:22 PM    ANISOCYTOSIS 1+ 08/31/2022 09:15 AM        Lab Results   Component Value Date/Time    HBA1C 5.0 10/26/2023 09:40 AM        Imaging:   I personally reviewed following images, these are my reads  MRI lumbar spine 2/7/2024: Annular fissure L4-L5 and mild bilateral facet arthropathy L5-S1 and L4-L5    MRI pelvis 12/5/2022: Degenerative changes of lumbosacral region, minimal disc space between L5 and S1, mild facet arthropathy lumbosacral  Sacral XR 2013: mild R SI     5/8/2024 right foot x-ray: Mild MTP osteoarthritis    IMAGING radiology reads. I reviewed the following radiology reads   5/8/2024 11:59 AM     HISTORY/REASON FOR EXAM:  Atraumatic Pain/Swelling/Deformity; acute trauma over 1st MTP Nov 2023. monitor for evidence of fracture  Foot pain     TECHNIQUE/EXAM DESCRIPTION AND NUMBER OF VIEWS:  3 views of the RIGHT foot.     COMPARISON:  None.     FINDINGS:  There is no fracture or dislocation.  The visualized osseous structures are in anatomic alignment.  Mild degenerative changes of the first metatarsal phalangeal joint.  Bone mineralization is age-appropriate..        IMPRESSION:     No acute osseous abnormality.     Mild first MTP osteoarthritis.       Results for orders placed during the hospital encounter of 11/19/12 4/5/2024 9:29 AM     HISTORY/REASON FOR EXAM:  Low back pain.     TECHNIQUE/EXAM DESCRIPTION:  MRI of the lumbar spine without contrast.     Multiplanar multisequence MR examination of the lumbar spine. COMPARISON:  None.     FINDINGS:  The lumbar spine maintains normal height and alignment. There is no fracture or dislocation. There is no pathologic marrow infiltration. There is no focal aggressive osseous lesion. There is  no perivertebral, disc or epidural fluid collection.     At the level of L5-S1,there is disc degeneration. Bilateral facet joint arthropathy is seen. There is no spinal or neural foraminal stenosis.     At the level of L4-5,there is disc degeneration. There is annular fissure. There is no spinal or neural foraminal stenosis. Mild facet joint arthropathy is seen.     At the level of L3-4,there is no spinal or neural foraminal stenosis.     At the level of the L2-3,there is no spinal or neural foraminal stenosis.     At the level of L1-2,there is no spinal or neural foraminal stenosis.     The conus terminates at the level of L1. The visualized lower thoracic spinal cord is unremarkable     The visualized pre-and paraspinal soft tissues appear normal.     IMPRESSION:     Mild degenerative disease in the lumbar spine as described above.         2/7/2024 9:23 AM     HISTORY/REASON FOR EXAM: Low back pain     TECHNIQUE/ EXAM DESCRIPTION AND NUMBER OF VIEWS:  3 views of the lumbar spine.     COMPARISON: None.     FINDINGS:  No acute fracture is detected.     Preserved lumbar lordosis. No lumbar scoliosis.     Moderate disc height loss at L5-S1 level. Otherwise, disc spaces are preserved. No lumbar facet hypertrophy. No listhesis.     Surgical clips in the right upper quadrant.     IMPRESSION:     1.  Moderate disc height loss at L5-S1 level. Otherwise, disc spaces are preserved.  2.  No acute fracture or listhesis.    Diagnosis   Visit Diagnoses     ICD-10-CM   1. Foot pain, right  M79.671   2. Facet arthritis of lumbar region  M47.816   3. Spondylosis  M47.9   4. Localized osteoarthrosis of right ankle and foot  M19.071         ASSESSMENT AND PLAN:  Annamaria Babin 38 y.o. female  Patient with historical and clinical evidence consistent with multiple pain generators  Right first metatarsal osteoarthritis: Supported by x-ray; alleviated with Voltaren gel.  Recommended to reduce use of Aleve and to consider using  gabapentin 50mg PRN and Tylenol; if progression of pain, will consider podiatry referral  Right wrist paresthesia: No findings on exam but may use gabapentin 50 mg to see if that alleviates the discomfort  Chronic low back pain due to facet arthropathy: Managed with another pain clinic; refer to acupuncture  Abnormal TSH: Continue management plan with PCP  vitamin D deficiency: Vitamin D level 27 and recommend repeat vitamin D lab  History of major depressive disorder : continue with [facility] as they can also discuss other drug classes for chronic pain management  Dietary and exercising counseling discussed  Extensive discussion regarding treatment options, at this time recommending the following:    Orders Placed This Encounter    DX-FOOT-COMPLETE 3+ RIGHT    Referral for Acupuncture         PLAN  -Medications/Modalities:   -consider Tylenol/acetaminophen 975mg (for example, 3 tabs of 325mg, or 2 tabs of 500mg) every 6-8 hours as needed for mild/moderate pain.  Do not take more than 3000mg of Tylenol in 24 hours.   -gabapentin 50mg -100mg nightly as needed  -voltaren gel   NSAIDS for severe pain  -consider decreasing use of Aleve   -You may also try ice packs or heating pads to help with pain for no more than 15 minutes at a time  -Limitations:    Activity as tolerated  -Brace/orthotic/assistive devices: Not indicated at this time  -Therapy (PT/OT/Aquatherapy): continue  -Home exercise program: encouraged activity as comfort  -Office Injections: not indicated at this time  -Diagnostic workup: reviewed today as above; XR  -Interventional program: I would consider the patient for epidural steroid or sacroiliac steroid injection depending on the results of the above   -Referrals: Acupuncture  -Outside records requested:  The patient signed Outside Records Request Form for her outside records including images. This includes the records from [facility]    Follow-up: Follow-up in 3 months or sooner for acute pain  Patient  expressed understanding of the management plan. Patient (and Family Members) was/were encouraged to call if any worries, issues, problems or concerns prior to the next visit     Please note that this dictation was created using voice recognition software. I have made every reasonable attempt to correct obvious errors but there may be errors of grammar and content that I may have overlooked prior to finalization of this note.    My total time spent caring for the patient on the day of the encounter was 44 minutes.   This does not include time spent on separately billable procedures/tests.      Luigi Noguera, DO  Physical Medicine and Rehabilitation  [facility]         YUMIKO Red.-KELLI.   Sarah Cuadra P.A.-C.

## 2024-05-08 NOTE — Clinical Note
Sarah Wolf   Annamaria Babin was evaluated for follow-up.  Having her restart the gabapentin at a low dose to see if she can tolerate the side effects of 50 mg as it sounds like she has global pain.  I also informed her that [facility] be effective as there are other drug classes that may be considered for chronic pain control.  I did refer her for acupuncture  Please contact if further questions.     Best Regards,   Luigi Noguera, DO   Physical Medicine and Rehabilitation

## 2024-05-08 NOTE — TELEPHONE ENCOUNTER
Received request via: Pharmacy    Was the patient seen in the last year in this department? Yes    Does the patient have an active prescription (recently filled or refills available) for medication(s) requested? No    [facility]    Does the patient have [facility] and need 100 day supply (blood pressure, diabetes and cholesterol meds only)? Patient does not have [facility]

## 2024-05-08 NOTE — PROGRESS NOTES
"CHIEF COMPLIANT: \"Establish care.\"  HISTORY OF PRESENT ILLNESS:  Annamaria Babin is a 39 y.o. female kindly referred by Sarah Wolf P.A.-C. and  is here for a sleep medicine consultation.   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 The patient reports EDS, can hardly breathe when she is flat on her back.  She reports nightmares and coarse breathing (? Snoring)  The patient goes to bed at 10 pm and wakes up at 6 am. It usually takes the patient approximately 10 mins to fall asleep. The patient does not  feel refreshed after sleeping and does  nap during the day for 1 hour but does not feel refreshed. The patient has never used tobacco.  . Pt does not use cannabis.  This pt does drink 0-2 alcoholic beverages per month and has 1 caffeinated beverages daily.     The patient denies any symptoms of narcolepsy such as sleep paralysis or cataplexy, or any symptoms that suggest parasomnias such as sleep walking or acting out of dreams.    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     Hashimoto disease     History of back surgery 11/14/2013    Has had about 5 minimally invasive procedures low back and both hips as SI crest area.      History of strabismus 11/14/2013    Has surgery both eyes for it 1997.      Indigestion     Liver disease     non viral hepatitis    Mastocytosis     Morning headache     PONV " (postoperative nausea and vomiting)     Psychiatric problem     Renal disorder     chronic kidney disease-blood in urine, thin basement membrance    S/P cholecystectomy 11/14/2013    S/P inguinal hernia repair 11/14/2013    Left side.       PROBLEM LIST:  Patient Active Problem List    Diagnosis Date Noted    Spondylosis 02/15/2024    Facet arthritis of lumbar region 02/15/2024    Obesity (BMI 30-39.9) 02/01/2024    Bilateral sacroiliitis (HCC) 02/01/2024    Vaginal bleeding 09/26/2023    RTI (respiratory tract infection) 05/11/2023    Irregular periods 12/20/2022    Ovarian mass, right 12/20/2022    Encounter for annual routine gynecological examination 10/24/2022    Iron deficiency anemia due to chronic blood loss 08/12/2022    Family history of arterial disease 03/18/2022    Severe episode of recurrent major depressive disorder, without psychotic features (HCC) 01/22/2019    Vitamin D insufficiency 10/01/2018    Postoperative hypothyroidism 01/15/2016    Mild intermittent asthma 09/28/2015    Mast cell disease 11/19/2014    Dermatographic urticaria 11/14/2013    Migraine headache 11/14/2013    Hashimoto's thyroiditis 05/17/2012    Thin basement membrane nephropathy 05/17/2012     PAST SOCIAL HISTORY:  Past Surgical History:   Procedure Laterality Date    IRRIGATION & DEBRIDEMENT GENERAL Right 8/22/2022    Procedure: IRRIGATION AND DEBRIDEMENT,RIGHT ANKLE WOUND;  Surgeon: Vernon Gaspar M.D.;  Location: SURGERY [facility];  Service: Orthopedics    EXOSTOSIS EXCISION Right 6/24/2022    Procedure: REMOVAL RIGHT FIBULA EXOSTOSIS;  Surgeon: Vernon Gaspar M.D.;  Location: SURGERY [facility];  Service: Orthopedics    DEBRIDEMENT Right 6/24/2022    Procedure: DEBRIDEMENT RIGHT FIBULA;  Surgeon: Vernon Gaspar M.D.;  Location: SURGERY [facility];  Service: Orthopedics    PELVISCOPY  03/06/2019    Procedure: PELVISCOPY- EUA;  Surgeon: Alesia Krishnamurthy M.D.;  Location: SURGERY SAME DAY [facility];   Service: Gynecology    SALPINGECTOMY Bilateral 03/06/2019    Procedure: SALPINGECTOMY;  Surgeon: Alesia Krishnamurthy M.D.;  Location: SURGERY SAME DAY [facility];  Service: Gynecology    OVARIAN CYSTECTOMY Right 03/06/2019    Procedure: OVARIAN CYSTECTOMY;  Surgeon: Alesia Krishnamurthy M.D.;  Location: SURGERY SAME DAY [facility];  Service: Gynecology    HYSTEROSCOPY WITH MYOSURE N/A 03/06/2019    Procedure: HYSTEROSCOPY WITH MYOSURE-DIAGNOSTIC AND OPERATIVE, EXCISION OF POLYP/FIBROIDS, ENDOMETRIAL CURRETTAGE;  Surgeon: Alesia Krishnamurthy M.D.;  Location: SURGERY SAME DAY [facility];  Service: Gynecology    TUBAL LIGATION  2019    THYROIDECTOMY  2013    CHOLECYSTECTOMY  2011    INGUINAL HERNIA REPAIR  2002    ORIF, ANKLE Right 1997    STRABISMUS REPAIR  1996    TONSILLECTOMY  1995    OTHER      ears     PAST FAMILY HISTORY:  Family History   Problem Relation Age of Onset    Hypertension Mother     Diabetes Mother     GI Disease Mother         hep c    Other Mother         arterial occlusion    Cancer Mother         unknown. ?lung, metastasis    Heart Disease Father     Heart Failure Father     Hypertension Father     Diabetes Father     Other Maternal Aunt         arterial occlusion     SOCIAL HISTORY:  Social History     Socioeconomic History    Marital status: Single     Spouse name: Not on file    Number of children: Not on file    Years of education: Not on file    Highest education level: Not on file   Occupational History    Not on file   Tobacco Use    Smoking status: Never    Smokeless tobacco: Never   Vaping Use    Vaping Use: Never used   Substance and Sexual Activity    Alcohol use: Yes     Alcohol/week: 0.0 - 0.6 oz     Comment: Rarely    Drug use: Not Currently     Types: Marijuana     Comment: Edibles    Sexual activity: Yes     Partners: Male     Birth control/protection: Condom     Comment: , 3 kids and one nephew   Other Topics Concern    Not on file   Social History  Narrative    Retired, 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 Used to work for      Social Determinants of Health     Financial Resource Strain: Not on file   Food Insecurity: Not on file   Transportation Needs: Not on file   Physical Activity: Not on file   Stress: Not on file   Social Connections: Not on file   Intimate Partner Violence: Not on file   Housing Stability: Not on file     ALLERGIES: Imuran [azathioprine sodium], Sulfasalazine, Plaquenil [hydroxychloroquine sulfate], Vicodin [hydrocodone-acetaminophen], Other drug, and Other misc  MEDICATIONS:  Current Outpatient Medications   Medication Sig Dispense Refill    zolpidem (AMBIEN) 5 MG Tab Take 1 Tablet by mouth at bedtime as needed for Sleep (for sleep study) for up to 1 dose. One tablet is a one day dose  Indications: Trouble Sleeping 1 Tablet 0    SYNTHROID 175 MCG Tab TAKE 1 TABLET BY MOUTH EVERY DAY IN THE MORNING ON EMPTY STOMACH FOR 90 DAYS 90 Tablet 1    methylPREDNISolone (MEDROL DOSEPAK) 4 MG Tablet Therapy Pack As directed on the packaging label. 21 Tablet 0    vitamin D2, Ergocalciferol, (DRISDOL) 1.25 MG (38106 UT) Cap capsule Take 1 tablet by mouth every 7 days 12 Capsule 1    sertraline (ZOLOFT) 100 MG Tab Take 1 Tablet by mouth every day. 90 Tablet 3    ibuprofen (MOTRIN) 600 MG Tab Take 1 Tablet by mouth every 6 hours as needed for Fever. 30 Tablet 0    ferrous gluconate (FERGON) 324 (38 Fe) MG Tab TAKE 1 TABLET BY MOUTH EVERY DAY 90 Tablet 1    montelukast (SINGULAIR) 10 MG Tab Take 1 Tablet by mouth every day. 90 Tablet 3    cetirizine (ZYRTEC) 10 MG Tab Take 1 Tablet by mouth 2 times a day. 180 Tablet 3    hydrOXYzine HCl (ATARAX) 10 MG Tab TAKE 1 TABLET BY MOUTH THREE TIMES A DAY AS NEEDED 270 Tablet 1    albuterol 108 (90 Base) MCG/ACT Aero Soln inhalation aerosol INHALE 2 PUFFS BY MOUTH EVERY 6 HOURS AS NEEDED FOR SHORTNESS OF BREATH 8.5 Each 6    famotidine (PEPCID) 20 MG Tab Take 1 Tab by mouth 2 times a day. 60 Tab 11    acyclovir  "(ZOVIRAX) 5 % Ointment APPLY TO COLD SORE 4 TIMES DAILY 30 g 0    SYNTHROID 175 MCG Tab TAKE 1 TABLET BY MOUTH EVERY DAY IN THE MORNING ON EMPTY STOMACH FOR 90 DAYS 90 Tablet 1    gabapentin (NEURONTIN) 100 MG Cap Take 1-3 Capsules by mouth at bedtime as needed (pain). 90 Capsule 3    cyclobenzaprine (FLEXERIL) 5 mg tablet Take 1 to 2 tablets by mouth in the evening as needed for back muscle spasm 20 Tablet 0     No current facility-administered medications for this visit.    \"CURRENT RX\"    REVIEW OF SYSTEMS:  Constitutional: Denies weight loss, endorses chronic daytime fatigue --also see HPI    PHYSICAL EXAM/VITALS:  /74 (BP Location: Left arm, Patient Position: Sitting, BP Cuff Size: Adult)   Pulse 84   Resp 16   Ht 1.626 m (5' 4\")   Wt 91.6 kg (202 lb)   SpO2 96%   BMI 34.67 kg/m²   Neck circumference (inches): 15  Appearance: Well-nourished, well-developed,  looks stated age, no acute distress  Eyes:   EOMI  ENMT:   Hard palate narrow: No   Hard palate high: No   Soft palate/uvula (Mallampati score): 4  Tongue Scalloping: No   Retrognathia:  No   Micrognathia:  No    Neck: Supple, trachea midline  Respiratory effort:  No intercostal retractions or use of accessory muscles  Lung auscultation:  No wheezes rhonchi rubs or rales  Cardiac: No murmurs, rubs, or gallops; regular rhythm, normal rate; no edema  Musculoskeletal:  Grossly normal; gait and station normal  Neurologic:  oriented to person, time, place, and purpose; judgement intact  Psychiatric:  No depression, anxiety, agitation    MEDICAL DECISION MAKING:  The medical record was reviewed as it pertains to this referral. This includes records from primary care,consultants notes, referral request, hospital records, labs and imaging. Any available diagnostic and titration nocturnal polysomnograms, home sleep apnea tests, continuous nocturnal oximetry results, multiple sleep latency tests, and recent compliance reports were reviewed with the " patient.    ASSESSMENT/PLAN:  Annmaaria Babin is a 39 y.o. female  who has a high pretest probability of having obstructive sleep apnea.  This patient has a complicated medical history and will be best served by an in lab PSG Diagnostic study.    DIAGNOSES :    1. Breathing-related sleep disorder  - Polysomnography, 4 or More; Future    2. Excessive daytime sleepiness  - Polysomnography, 4 or More; Future    3. Mild intermittent asthma without complication  - Polysomnography, 4 or More; Future    4. History of migraine headaches  - Polysomnography, 4 or More; Future    5. Adjustment insomnia  - zolpidem (AMBIEN) 5 MG Tab; Take 1 Tablet by mouth at bedtime as needed for Sleep (for sleep study) for up to 1 dose. One tablet is a one day dose  Indications: Trouble Sleeping  Dispense: 1 Tablet; Refill: 0    The patient has signs and symptoms consistent with obstructive sleep apnea hypopnea syndrome. Will schedule a nocturnal polysomnogram or a home sleep apnea test (please see plan).  The risks of untreated sleep apnea were discussed with the patient at length. Patients with RICHELLE are at increased risk of cardiovascular disease including coronary artery disease, systemic arterial hypertension, pulmonary arterial hypertension, cardiac arrhythmias, and stroke. RICHELLE patients have an increased risk of motor vehicle accidents, type 2 diabetes, chronic kidney disease, and non-alcoholic liver disease. The patient was advised to avoid driving a motor vehicle when drowsy.  Have advised the patient to follow up with the appropriate healthcare practitioners for all other medical problems and issues.    RETURN TO CLINIC: Return for With Dr Pool, with Dr. Murillo., 3 weeks after SS - discuss results w/ any provider.    My total time spent caring for the patient on the day of the encounter was 40 minutes. This includes time spent on a thorough chart review including other physician notes, all sleep studies, as well as critical labs  and pulmonary and cardiac studies.  Additionally, it includes a thorough discussion of good sleep hygiene and stimulus control, as well as  the need for consistency in terms of sleep preparation and practice.    Please note that this dictation was created using voice recognition software.  I have made every reasonable attempt to correct obvious errors, I expect that there are errors of grammar and possibly content that I did not discover before finalizing this note.

## 2024-05-23 ENCOUNTER — SLEEP STUDY (OUTPATIENT)
Dept: SLEEP MEDICINE | Facility: MEDICAL CENTER | Age: 39
End: 2024-05-23
Attending: PREVENTIVE MEDICINE
Payer: MEDICARE

## 2024-05-23 DIAGNOSIS — G47.30 BREATHING-RELATED SLEEP DISORDER: ICD-10-CM

## 2024-05-23 DIAGNOSIS — Z86.69 HISTORY OF MIGRAINE HEADACHES: ICD-10-CM

## 2024-05-23 DIAGNOSIS — G47.19 EXCESSIVE DAYTIME SLEEPINESS: ICD-10-CM

## 2024-05-23 DIAGNOSIS — J45.20 MILD INTERMITTENT ASTHMA WITHOUT COMPLICATION: ICD-10-CM

## 2024-05-23 PROCEDURE — 95811 POLYSOM 6/>YRS CPAP 4/> PARM: CPT | Performed by: PREVENTIVE MEDICINE

## 2024-05-24 NOTE — PROCEDURES
Physician:   Patient: MARIA M BOWMAN  ID: [id number] Date: 5/23/2024 Exam No.:   MONTAGE: Standard  STUDY TYPE: Split Night  RECORDING TECHNIQUE:   After the scalp was prepared, gold plated electrodes were applied to the scalp according to the International 10-20 System. EEG (electroencephalogram) was continuously monitored from the O1-M2, O2-M1, C3-M2, C4-M1, F3-M2, and F4-M1. EOGs (electrooculograms) were monitored by electrodes placed at the left and right outer canthi. Chin EMG (electromyogram) was monitored by electrodes placed on the mentalis and sub-mentalis muscles. Nasal and oral airflow were monitored using a triple port thermocouple as well as oronasal pressure transducer. Respiratory effort was measured by inductive plethysmography technology employing abdominal and thoracic belts. Blood oxygen saturation and pulse were monitored by pulse oximetry. Heart rhythm was monitored by surface electrocardiogram. Leg EMG was studied using surface electrodes placed on left and right anterior tibialis. A microphone was used to monitor tracheal sounds and snoring. Body position was monitored and documented by technician observation.   SCORING CRITERIA:   A modification of the AASM manual for scoring of sleep and associated events was used. Obstructive apneas were scored by cessation of airflow for at least 10 seconds with continuing respiratory effort. Central apneas were scored by cessation of airflow for at least 10 seconds with no respiratory effort. Hypopneas were scored by a 30% or more reduction in airflow for at least 10 seconds accompanied by arterial oxygen desaturation of 3% or an arousal. For CMS (Medicare) patients, per AASM rule 1B, hypopneas are scored by 30% with mild reduction in airflow for at least 10 seconds accompanied by arterial saturation decreased at 4%.  DIAGNOSTIC  Study start time was 08:42:15 PM. Diagnostic recording time was 203 minutes with a total sleep time of 167 minutes resulting in a  sleep efficiency of 82.27%%. Sleep latency from the start of the study was 03 minutes and the latency from sleep to REM was 146 minutes. In total,56 arousals were scored for an arousal index of 20.1.  Respiratory:  There were a total of 0 apneas consisting of 0 obstructive apneas, 0 mixed apneas, and 0 central apneas. A total of 16 hypopneas were scored. The apnea index was 0.00 per hour and the hypopnea index was 5.75 per hour resulting in an overall AHI of 5.75. AHI during REM was 20.2 and AHI while supine was 7.17.  Oximetry:  There was a mean oxygen saturation of 94.0%. The minimum oxygen saturation during NREM sleep was 91.0% and in REM was 81.0%. Time spent during sleep with oxygen saturations <88% was 0.8 minutes.   Cardiac:  The highest heart rate seen while awake was 96 BPM while the highest heart rate during sleep was 88 BPM with an average sleeping heart rate of 57 BPM.  Limb Movements:  There were a total of 172 PLMs during sleep, which resulted in a PLM index of 61.8. There were 13 PLMs associated with arousals which resulted in a PLMS arousal index of 4.7.  TREATMENT:  Treatment recording time was 5h 55.5m (355 minutes) with a total sleep time of 5h 31.0m (331 minutes) resulting in a sleep efficiency of 93.1%. Sleep latency from the start of treatment was 00 minutes and REM latency from sleep onset was 2h 49.0m. The patient had 61 arousals in total for an arousal index of 11.1.  Respiratory:   There were 0 apneas in total consisting of 0 obstructive apneas, 0 central apneas, and 0 mixed apneas for an apnea index of 0.00. The patient had 2 hypopneas in total, which resulted in a hypopnea index of 0.36. The overall AHI was 0.36, with a REM AHI of 2.09, and a supine AHI of 0.52.   Oximetry:  The mean SaO2 during treatment was 95.0%. The minimum oxygen saturation in NREM was 91.0 % and in REM was 90.0%. Patient spent 0.0 minutes of TST with SaO2 <88%.  Cardiac:  The highest heart rate during sleep was 88  BPM with an average sleeping heart rate of 54BPM.  Limb Movements:  There were a total of 168 PLMS during titration sleep time that resulted in an index of 30.5. There were 16 PLMS associated with arousals. This resulted in a PLM arousal index of 2.9.  Titration: CPAP was tried from 5-8cm H2O.  This was a fully attended sleep study. This test was technically adequate. This patient was titrated on CPAP starting at *** cm of water pressure. Patient was titrated up to *** cm of water pressure. Patient did best at *** cm of water pressure. Patient spent *** minutes at that pressure and the AHI was *** which is considered *** obstructive sleep apnea.   Assessment:   DIAGNOSTIC: ***Obstructive Sleep Apnea Hypopnea - AHI*** ***Nocturnal desaturation - zac saturation ***% - saturations <88% below for *** minutes of TST.  TREATMENT: ***Obstructive Sleep Apnea Hypopnea - AHI*** ***Nocturnal desaturation - zac saturation ***% - saturations <88% below for *** minutes of TST.  Recommendation:

## 2024-06-24 ENCOUNTER — HOSPITAL ENCOUNTER (OUTPATIENT)
Dept: LAB | Facility: MEDICAL CENTER | Age: 39
End: 2024-06-24
Attending: NURSE PRACTITIONER
Payer: MEDICARE

## 2024-06-24 ENCOUNTER — HOSPITAL ENCOUNTER (OUTPATIENT)
Facility: MEDICAL CENTER | Age: 39
End: 2024-06-24
Attending: NURSE PRACTITIONER
Payer: MEDICARE

## 2024-06-24 ENCOUNTER — OFFICE VISIT (OUTPATIENT)
Dept: MEDICAL GROUP | Facility: PHYSICIAN GROUP | Age: 39
End: 2024-06-24
Payer: MEDICARE

## 2024-06-24 VITALS
WEIGHT: 204 LBS | HEART RATE: 77 BPM | DIASTOLIC BLOOD PRESSURE: 72 MMHG | HEIGHT: 65 IN | OXYGEN SATURATION: 98 % | TEMPERATURE: 98.4 F | SYSTOLIC BLOOD PRESSURE: 126 MMHG | BODY MASS INDEX: 33.99 KG/M2

## 2024-06-24 DIAGNOSIS — N39.3 STRESS INCONTINENCE: ICD-10-CM

## 2024-06-24 DIAGNOSIS — N89.8 VAGINAL DISCHARGE: ICD-10-CM

## 2024-06-24 DIAGNOSIS — T36.95XA ANTIBIOTIC-INDUCED YEAST INFECTION: ICD-10-CM

## 2024-06-24 DIAGNOSIS — Z11.3 SCREEN FOR STD (SEXUALLY TRANSMITTED DISEASE): ICD-10-CM

## 2024-06-24 DIAGNOSIS — N30.01 ACUTE CYSTITIS WITH HEMATURIA: ICD-10-CM

## 2024-06-24 DIAGNOSIS — B37.9 ANTIBIOTIC-INDUCED YEAST INFECTION: ICD-10-CM

## 2024-06-24 LAB
APPEARANCE UR: CLEAR
BILIRUB UR STRIP-MCNC: NEGATIVE MG/DL
CANDIDA DNA VAG QL PROBE+SIG AMP: NEGATIVE
COLOR UR AUTO: YELLOW
G VAGINALIS DNA VAG QL PROBE+SIG AMP: POSITIVE
GLUCOSE UR STRIP.AUTO-MCNC: NEGATIVE MG/DL
HCV AB SER QL: NORMAL
HIV 1+2 AB+HIV1 P24 AG SERPL QL IA: NORMAL
KETONES UR STRIP.AUTO-MCNC: NEGATIVE MG/DL
LEUKOCYTE ESTERASE UR QL STRIP.AUTO: NEGATIVE
NITRITE UR QL STRIP.AUTO: NEGATIVE
PH UR STRIP.AUTO: 7.5 [PH] (ref 5–8)
PROT UR QL STRIP: NEGATIVE MG/DL
RBC UR QL AUTO: NORMAL
SP GR UR STRIP.AUTO: 1.02
T PALLIDUM AB SER QL IA: NORMAL
T VAGINALIS DNA VAG QL PROBE+SIG AMP: NEGATIVE
UROBILINOGEN UR STRIP-MCNC: 0.2 MG/DL

## 2024-06-24 PROCEDURE — 86803 HEPATITIS C AB TEST: CPT

## 2024-06-24 PROCEDURE — 87491 CHLMYD TRACH DNA AMP PROBE: CPT

## 2024-06-24 PROCEDURE — 3078F DIAST BP <80 MM HG: CPT | Performed by: NURSE PRACTITIONER

## 2024-06-24 PROCEDURE — 86780 TREPONEMA PALLIDUM: CPT

## 2024-06-24 PROCEDURE — 87086 URINE CULTURE/COLONY COUNT: CPT

## 2024-06-24 PROCEDURE — 3074F SYST BP LT 130 MM HG: CPT | Performed by: NURSE PRACTITIONER

## 2024-06-24 PROCEDURE — 87660 TRICHOMONAS VAGIN DIR PROBE: CPT

## 2024-06-24 PROCEDURE — 87591 N.GONORRHOEAE DNA AMP PROB: CPT

## 2024-06-24 PROCEDURE — 87480 CANDIDA DNA DIR PROBE: CPT

## 2024-06-24 PROCEDURE — 99214 OFFICE O/P EST MOD 30 MIN: CPT | Performed by: NURSE PRACTITIONER

## 2024-06-24 PROCEDURE — 87077 CULTURE AEROBIC IDENTIFY: CPT

## 2024-06-24 PROCEDURE — 87389 HIV-1 AG W/HIV-1&-2 AB AG IA: CPT

## 2024-06-24 PROCEDURE — 87510 GARDNER VAG DNA DIR PROBE: CPT

## 2024-06-24 PROCEDURE — 81002 URINALYSIS NONAUTO W/O SCOPE: CPT | Performed by: NURSE PRACTITIONER

## 2024-06-24 PROCEDURE — 36415 COLL VENOUS BLD VENIPUNCTURE: CPT

## 2024-06-24 RX ORDER — NITROFURANTOIN 25; 75 MG/1; MG/1
100 CAPSULE ORAL 2 TIMES DAILY
Qty: 10 CAPSULE | Refills: 0 | Status: SHIPPED | OUTPATIENT
Start: 2024-06-24 | End: 2024-06-29

## 2024-06-24 RX ORDER — FLUCONAZOLE 150 MG/1
150 TABLET ORAL DAILY
Qty: 1 TABLET | Refills: 0 | Status: SHIPPED | OUTPATIENT
Start: 2024-06-24 | End: 2024-06-25

## 2024-06-24 ASSESSMENT — FIBROSIS 4 INDEX: FIB4 SCORE: 0.82

## 2024-06-24 NOTE — PROGRESS NOTES
Subjective:     CC: UTI    HPI:   Annamaria is an established patient of Sarah Wolf PA-c who presents today with the following:      The dysuria, brown vaginal discharge, spotting, urinary urgency, urinary frequency and right flank pain started 3 days ago. Her menstrual cycle is not supposed until July 1. LMP 6/1/2024.  Hx of salpingectomy. Hx of hypothyroidism. She has tried Aleve that helped. She is requesting STD testing today and referral to urology for urinary incontinence. Denies fever, chills, hematuria, nausea, vomiting, odor discharge.      Past Medical History:   Diagnosis Date    Anemia     iron deficiency    Anesthesia     PONV    Arthritis     bilat sacroliitis/hips    Asthma     prn inhalers    Daytime sleepiness     Enlarged liver     Gynecological disorder     abnormal uterine bleeding    H/O thyroidectomy 11/14/2013 2010 for goitre.     Hashimoto disease     History of back surgery 11/14/2013    Has had about 5 minimally invasive procedures low back and both hips as SI crest area.      History of strabismus 11/14/2013    Has surgery both eyes for it 1997.      Indigestion     Liver disease     non viral hepatitis    Mastocytosis     Morning headache     PONV (postoperative nausea and vomiting)     Psychiatric problem     Renal disorder     chronic kidney disease-blood in urine, thin basement membrance    S/P cholecystectomy 11/14/2013    S/P inguinal hernia repair 11/14/2013    Left side.        Social History     Tobacco Use    Smoking status: Never    Smokeless tobacco: Never   Vaping Use    Vaping status: Never Used   Substance Use Topics    Alcohol use: Yes     Alcohol/week: 0.0 - 0.6 oz     Comment: Rarely    Drug use: Not Currently     Types: Marijuana     Comment: Edibles       Current Outpatient Medications Ordered in Epic   Medication Sig Dispense Refill    nitrofurantoin (MACROBID) 100 MG Cap Take 1 Capsule by mouth 2 times a day for 5 days. 10 Capsule 0    fluconazole (DIFLUCAN) 150 MG  tablet Take 1 Tablet by mouth every day for 1 dose. 1 Tablet 0    ibuprofen (MOTRIN) 600 MG Tab Take 1 Tablet by mouth every 6 hours as needed for Fever. 30 Tablet 0    montelukast (SINGULAIR) 10 MG Tab Take 1 Tablet by mouth every day. 90 Tablet 3    cetirizine (ZYRTEC) 10 MG Tab Take 1 Tablet by mouth 2 times a day. 180 Tablet 3    hydrOXYzine HCl (ATARAX) 10 MG Tab TAKE 1 TABLET BY MOUTH THREE TIMES A DAY AS NEEDED 270 Tablet 1    albuterol 108 (90 Base) MCG/ACT Aero Soln inhalation aerosol INHALE 2 PUFFS BY MOUTH EVERY 6 HOURS AS NEEDED FOR SHORTNESS OF BREATH 8.5 Each 6    famotidine (PEPCID) 20 MG Tab Take 1 Tab by mouth 2 times a day. 60 Tab 11    acyclovir (ZOVIRAX) 5 % Ointment APPLY TO COLD SORE 4 TIMES DAILY 30 g 0    zolpidem (AMBIEN) 5 MG Tab Take 1 Tablet by mouth at bedtime as needed for Sleep (for sleep study) for up to 1 dose. One tablet is a one day dose  Indications: Trouble Sleeping (Patient not taking: Reported on 6/24/2024) 1 Tablet 0    SYNTHROID 175 MCG Tab TAKE 1 TABLET BY MOUTH EVERY DAY IN THE MORNING ON EMPTY STOMACH FOR 90 DAYS 90 Tablet 1    methylPREDNISolone (MEDROL DOSEPAK) 4 MG Tablet Therapy Pack As directed on the packaging label. (Patient not taking: Reported on 6/24/2024) 21 Tablet 0    vitamin D2, Ergocalciferol, (DRISDOL) 1.25 MG (68935 UT) Cap capsule Take 1 tablet by mouth every 7 days (Patient not taking: Reported on 6/24/2024) 12 Capsule 1    sertraline (ZOLOFT) 100 MG Tab Take 1 Tablet by mouth every day. (Patient not taking: Reported on 6/24/2024) 90 Tablet 3    ferrous gluconate (FERGON) 324 (38 Fe) MG Tab TAKE 1 TABLET BY MOUTH EVERY DAY (Patient not taking: Reported on 6/24/2024) 90 Tablet 1     No current Epic-ordered facility-administered medications on file.       Allergies:  Imuran [azathioprine sodium], Sulfasalazine, Plaquenil [hydroxychloroquine sulfate], Vicodin [hydrocodone-acetaminophen], Other drug, and Other misc    Health Maintenance:  "Deferred      Objective:     Vital signs reviewed  Exam:  /72 (BP Location: Right arm, Patient Position: Sitting, BP Cuff Size: Adult)   Pulse 77   Temp 36.9 °C (98.4 °F) (Temporal)   Ht 1.651 m (5' 5\")   Wt 92.5 kg (204 lb)   LMP 06/21/2024   SpO2 98%   BMI 33.95 kg/m²  Body mass index is 33.95 kg/m².    Gen: Alert and oriented, No apparent distress.  Lungs: Normal effort, CTA bilaterally, no wheezes, rhonchi, or rales. No CVA tenderness.   CV: Regular rate and rhythm. No murmurs, rubs, or gallops.      Labs:        Latest Reference Range & Units 06/24/24 11:07   POC Color Negative  yellow   POC Appearance Negative  clear   POC Specific Gravity <1.005 - >1.030  1.020   POC Urine PH 5.0 - 8.0  7.5   POC Glucose Negative mg/dL negative   POC Ketones Negative mg/dL negative   POC Protein Negative mg/dL negative   POC Nitrites Negative  negative   POC Leukocyte Esterase Negative  negative   POC Blood Negative  trace-intact   POC Bilirubin Negative mg/dL negative   POC Urobiligen Negative (0.2) mg/dL 0.2       Assessment & Plan:     39 y.o. female with the following -     1. Acute cystitis with hematuria  Acute uncomplicated problem today.  Urinalysis positive for trace blood, no CVA tenderness.  Allergies verified.  Given her symptoms we will start her on nitrofurantoin 100 mg twice daily for 5 days.  Culture urine.  Recommend staying hydrated and emptying bladder frequently.  - POCT Urinalysis  - URINE CULTURE(NEW); Future  - nitrofurantoin (MACROBID) 100 MG Cap; Take 1 Capsule by mouth 2 times a day for 5 days.  Dispense: 10 Capsule; Refill: 0    2. Antibiotic-induced yeast infection  Acute uncomplicated problem.  Patient states that she gets yeast infections with antibiotics, dose prescribed of fluconazole.  - fluconazole (DIFLUCAN) 150 MG tablet; Take 1 Tablet by mouth every day for 1 dose.  Dispense: 1 Tablet; Refill: 0    3. Vaginal discharge  Acute uncomplicated problem.  With her vaginal discharge " will check vaginal pathogens.  - VAGINAL PATHOGENS DNA PANEL; Future    4. Stress incontinence  Chronic exacerbated problem.  She is requesting referral to urology.  Referral placed.  - Referral to Urology    5. Screen for STD (sexually transmitted disease)  Acute uncomplicated problem.  She is requesting screening.  - Chlamydia/GC, PCR (Urine); Future  - HEP C VIRUS ANTIBODY; Future  - HIV AG/AB COMBO ASSAY SCREENING; Future  - T.PALLIDUM AB LUCY (SCREENING); Future      Return if symptoms worsen or fail to improve.    Please note that this dictation was created using voice recognition software. I have made every reasonable attempt to correct obvious errors, but I expect that there are errors of grammar and possibly content that I did not discover before finalizing the note.

## 2024-06-25 DIAGNOSIS — N76.0 BACTERIAL VAGINOSIS: ICD-10-CM

## 2024-06-25 DIAGNOSIS — B96.89 BACTERIAL VAGINOSIS: ICD-10-CM

## 2024-06-25 RX ORDER — METRONIDAZOLE 7.5 MG/G
1 GEL VAGINAL
Qty: 5 EACH | Refills: 0 | Status: SHIPPED | OUTPATIENT
Start: 2024-06-25 | End: 2024-06-30

## 2024-06-25 NOTE — PROGRESS NOTES
Vaginal pathogen swab positive for bacterial vaginosis.  Start 5-day course of metronidazole vaginal gel.

## 2024-06-26 LAB
BACTERIA UR CULT: ABNORMAL
BACTERIA UR CULT: ABNORMAL
SIGNIFICANT IND 70042: ABNORMAL
SITE SITE: ABNORMAL
SOURCE SOURCE: ABNORMAL

## 2024-07-02 ENCOUNTER — OFFICE VISIT (OUTPATIENT)
Dept: UROLOGY | Facility: MEDICAL CENTER | Age: 39
End: 2024-07-02
Payer: MEDICARE

## 2024-07-02 DIAGNOSIS — N39.3 STRESS INCONTINENCE: Primary | ICD-10-CM

## 2024-07-02 LAB
POC POST-VOID: 121 ML
POC PRE-VOID: NORMAL

## 2024-07-02 PROCEDURE — 51798 US URINE CAPACITY MEASURE: CPT | Performed by: PHYSICIAN ASSISTANT

## 2024-07-02 PROCEDURE — 99204 OFFICE O/P NEW MOD 45 MIN: CPT | Performed by: PHYSICIAN ASSISTANT

## 2024-07-03 ENCOUNTER — OFFICE VISIT (OUTPATIENT)
Dept: UROLOGY | Facility: MEDICAL CENTER | Age: 39
End: 2024-07-03
Payer: MEDICARE

## 2024-07-03 VITALS — BODY MASS INDEX: 33.99 KG/M2 | HEIGHT: 65 IN | WEIGHT: 204 LBS

## 2024-07-03 DIAGNOSIS — N94.10 DYSPAREUNIA IN FEMALE: ICD-10-CM

## 2024-07-03 DIAGNOSIS — N39.3 STRESS INCONTINENCE: ICD-10-CM

## 2024-07-03 PROCEDURE — 99204 OFFICE O/P NEW MOD 45 MIN: CPT | Performed by: STUDENT IN AN ORGANIZED HEALTH CARE EDUCATION/TRAINING PROGRAM

## 2024-07-03 ASSESSMENT — FIBROSIS 4 INDEX: FIB4 SCORE: 0.82

## 2024-07-05 ENCOUNTER — TELEPHONE (OUTPATIENT)
Dept: UROLOGY | Facility: MEDICAL CENTER | Age: 39
End: 2024-07-05
Payer: MEDICARE

## 2024-07-08 ENCOUNTER — TELEPHONE (OUTPATIENT)
Dept: UROLOGY | Facility: MEDICAL CENTER | Age: 39
End: 2024-07-08
Payer: MEDICARE

## 2024-07-09 ENCOUNTER — TELEPHONE (OUTPATIENT)
Dept: UROLOGY | Facility: MEDICAL CENTER | Age: 39
End: 2024-07-09
Payer: MEDICARE

## 2024-07-09 ENCOUNTER — HOSPITAL ENCOUNTER (OUTPATIENT)
Dept: RADIOLOGY | Facility: MEDICAL CENTER | Age: 39
End: 2024-07-09
Attending: PHYSICAL MEDICINE & REHABILITATION
Payer: MEDICARE

## 2024-07-09 DIAGNOSIS — M46.1 SACROILIITIS, NOT ELSEWHERE CLASSIFIED (HCC): ICD-10-CM

## 2024-07-09 PROCEDURE — 73521 X-RAY EXAM HIPS BI 2 VIEWS: CPT

## 2024-07-10 ENCOUNTER — APPOINTMENT (OUTPATIENT)
Dept: ADMISSIONS | Facility: MEDICAL CENTER | Age: 39
End: 2024-07-10
Attending: STUDENT IN AN ORGANIZED HEALTH CARE EDUCATION/TRAINING PROGRAM
Payer: MEDICARE

## 2024-07-11 ENCOUNTER — ANESTHESIA EVENT (OUTPATIENT)
Dept: SURGERY | Facility: MEDICAL CENTER | Age: 39
End: 2024-07-11
Payer: MEDICARE

## 2024-07-11 ENCOUNTER — PRE-ADMISSION TESTING (OUTPATIENT)
Dept: ADMISSIONS | Facility: MEDICAL CENTER | Age: 39
End: 2024-07-11
Attending: STUDENT IN AN ORGANIZED HEALTH CARE EDUCATION/TRAINING PROGRAM
Payer: MEDICARE

## 2024-07-12 ENCOUNTER — ANESTHESIA (OUTPATIENT)
Dept: SURGERY | Facility: MEDICAL CENTER | Age: 39
End: 2024-07-12
Payer: MEDICARE

## 2024-07-12 ENCOUNTER — HOSPITAL ENCOUNTER (OUTPATIENT)
Facility: MEDICAL CENTER | Age: 39
End: 2024-07-12
Attending: STUDENT IN AN ORGANIZED HEALTH CARE EDUCATION/TRAINING PROGRAM | Admitting: STUDENT IN AN ORGANIZED HEALTH CARE EDUCATION/TRAINING PROGRAM
Payer: MEDICARE

## 2024-07-12 VITALS
OXYGEN SATURATION: 95 % | BODY MASS INDEX: 32.29 KG/M2 | RESPIRATION RATE: 19 BRPM | SYSTOLIC BLOOD PRESSURE: 130 MMHG | HEIGHT: 65 IN | DIASTOLIC BLOOD PRESSURE: 69 MMHG | WEIGHT: 193.78 LBS | TEMPERATURE: 97.1 F | HEART RATE: 60 BPM

## 2024-07-12 DIAGNOSIS — N39.3 PRIMARY STRESS URINARY INCONTINENCE: ICD-10-CM

## 2024-07-12 PROBLEM — R11.2 PONV (POSTOPERATIVE NAUSEA AND VOMITING): Status: ACTIVE | Noted: 2024-07-12

## 2024-07-12 PROBLEM — Z98.890 PONV (POSTOPERATIVE NAUSEA AND VOMITING): Status: ACTIVE | Noted: 2024-07-12

## 2024-07-12 LAB
ALBUMIN SERPL BCP-MCNC: 4.5 G/DL (ref 3.2–4.9)
ALBUMIN/GLOB SERPL: 1.6 G/DL
ALP SERPL-CCNC: 116 U/L (ref 30–99)
ALT SERPL-CCNC: 17 U/L (ref 2–50)
ANION GAP SERPL CALC-SCNC: 15 MMOL/L (ref 7–16)
APPEARANCE UR: CLEAR
AST SERPL-CCNC: 20 U/L (ref 12–45)
BILIRUB SERPL-MCNC: 0.6 MG/DL (ref 0.1–1.5)
BILIRUB UR QL STRIP.AUTO: NEGATIVE
BUN SERPL-MCNC: 11 MG/DL (ref 8–22)
CALCIUM ALBUM COR SERPL-MCNC: 8.5 MG/DL (ref 8.5–10.5)
CALCIUM SERPL-MCNC: 8.9 MG/DL (ref 8.5–10.5)
CHLORIDE SERPL-SCNC: 105 MMOL/L (ref 96–112)
CO2 SERPL-SCNC: 22 MMOL/L (ref 20–33)
COLOR UR: YELLOW
CREAT SERPL-MCNC: 0.61 MG/DL (ref 0.5–1.4)
ERYTHROCYTE [DISTWIDTH] IN BLOOD BY AUTOMATED COUNT: 41.2 FL (ref 35.9–50)
GFR SERPLBLD CREATININE-BSD FMLA CKD-EPI: 116 ML/MIN/1.73 M 2
GLOBULIN SER CALC-MCNC: 2.8 G/DL (ref 1.9–3.5)
GLUCOSE SERPL-MCNC: 89 MG/DL (ref 65–99)
GLUCOSE UR STRIP.AUTO-MCNC: NEGATIVE MG/DL
HCG UR QL: NEGATIVE
HCT VFR BLD AUTO: 47.5 % (ref 37–47)
HGB BLD-MCNC: 16.3 G/DL (ref 12–16)
KETONES UR STRIP.AUTO-MCNC: ABNORMAL MG/DL
LEUKOCYTE ESTERASE UR QL STRIP.AUTO: NEGATIVE
MCH RBC QN AUTO: 30.3 PG (ref 27–33)
MCHC RBC AUTO-ENTMCNC: 34.3 G/DL (ref 32.2–35.5)
MCV RBC AUTO: 88.3 FL (ref 81.4–97.8)
MICRO URNS: ABNORMAL
NITRITE UR QL STRIP.AUTO: NEGATIVE
PH UR STRIP.AUTO: 5 [PH] (ref 5–8)
PLATELET # BLD AUTO: 258 K/UL (ref 164–446)
PMV BLD AUTO: 10.2 FL (ref 9–12.9)
POTASSIUM SERPL-SCNC: 3.7 MMOL/L (ref 3.6–5.5)
PROT SERPL-MCNC: 7.3 G/DL (ref 6–8.2)
PROT UR QL STRIP: NEGATIVE MG/DL
RBC # BLD AUTO: 5.38 M/UL (ref 4.2–5.4)
RBC UR QL AUTO: NEGATIVE
SODIUM SERPL-SCNC: 142 MMOL/L (ref 135–145)
SP GR UR STRIP.AUTO: 1.02
UROBILINOGEN UR STRIP.AUTO-MCNC: 0.2 MG/DL
WBC # BLD AUTO: 6 K/UL (ref 4.8–10.8)

## 2024-07-12 PROCEDURE — 51715 ENDOSCOPIC INJECTION/IMPLANT: CPT | Performed by: STUDENT IN AN ORGANIZED HEALTH CARE EDUCATION/TRAINING PROGRAM

## 2024-07-12 PROCEDURE — 160048 HCHG OR STATISTICAL LEVEL 1-5: Performed by: STUDENT IN AN ORGANIZED HEALTH CARE EDUCATION/TRAINING PROGRAM

## 2024-07-12 PROCEDURE — 700111 HCHG RX REV CODE 636 W/ 250 OVERRIDE (IP): Performed by: ANESTHESIOLOGY

## 2024-07-12 PROCEDURE — 160009 HCHG ANES TIME/MIN: Performed by: STUDENT IN AN ORGANIZED HEALTH CARE EDUCATION/TRAINING PROGRAM

## 2024-07-12 PROCEDURE — 700101 HCHG RX REV CODE 250: Performed by: ANESTHESIOLOGY

## 2024-07-12 PROCEDURE — 160035 HCHG PACU - 1ST 60 MINS PHASE I: Performed by: STUDENT IN AN ORGANIZED HEALTH CARE EDUCATION/TRAINING PROGRAM

## 2024-07-12 PROCEDURE — 160028 HCHG SURGERY MINUTES - 1ST 30 MINS LEVEL 3: Performed by: STUDENT IN AN ORGANIZED HEALTH CARE EDUCATION/TRAINING PROGRAM

## 2024-07-12 PROCEDURE — 81003 URINALYSIS AUTO W/O SCOPE: CPT

## 2024-07-12 PROCEDURE — 160002 HCHG RECOVERY MINUTES (STAT): Performed by: STUDENT IN AN ORGANIZED HEALTH CARE EDUCATION/TRAINING PROGRAM

## 2024-07-12 PROCEDURE — C1769 GUIDE WIRE: HCPCS | Performed by: STUDENT IN AN ORGANIZED HEALTH CARE EDUCATION/TRAINING PROGRAM

## 2024-07-12 PROCEDURE — 700105 HCHG RX REV CODE 258: Performed by: STUDENT IN AN ORGANIZED HEALTH CARE EDUCATION/TRAINING PROGRAM

## 2024-07-12 PROCEDURE — L8606 SYNTHETIC IMPLNT URINARY 1ML: HCPCS | Performed by: STUDENT IN AN ORGANIZED HEALTH CARE EDUCATION/TRAINING PROGRAM

## 2024-07-12 PROCEDURE — 80053 COMPREHEN METABOLIC PANEL: CPT

## 2024-07-12 PROCEDURE — 700101 HCHG RX REV CODE 250: Performed by: STUDENT IN AN ORGANIZED HEALTH CARE EDUCATION/TRAINING PROGRAM

## 2024-07-12 PROCEDURE — 81025 URINE PREGNANCY TEST: CPT

## 2024-07-12 PROCEDURE — 85027 COMPLETE CBC AUTOMATED: CPT

## 2024-07-12 PROCEDURE — 160039 HCHG SURGERY MINUTES - EA ADDL 1 MIN LEVEL 3: Performed by: STUDENT IN AN ORGANIZED HEALTH CARE EDUCATION/TRAINING PROGRAM

## 2024-07-12 DEVICE — SYSTEM KIT BULKAMID URETHRAL BULKING (1/EA) ***MUST ORDER A MIN OF 5 EA***: Type: IMPLANTABLE DEVICE | Site: URETHRA | Status: FUNCTIONAL

## 2024-07-12 RX ORDER — DIPHENHYDRAMINE HYDROCHLORIDE 50 MG/ML
12.5 INJECTION INTRAMUSCULAR; INTRAVENOUS
Status: DISCONTINUED | OUTPATIENT
Start: 2024-07-12 | End: 2024-07-12 | Stop reason: HOSPADM

## 2024-07-12 RX ORDER — SODIUM CHLORIDE, SODIUM LACTATE, POTASSIUM CHLORIDE, CALCIUM CHLORIDE 600; 310; 30; 20 MG/100ML; MG/100ML; MG/100ML; MG/100ML
INJECTION, SOLUTION INTRAVENOUS CONTINUOUS
Status: ACTIVE | OUTPATIENT
Start: 2024-07-12 | End: 2024-07-12

## 2024-07-12 RX ORDER — DIPHENHYDRAMINE HYDROCHLORIDE 50 MG/ML
INJECTION INTRAMUSCULAR; INTRAVENOUS PRN
Status: DISCONTINUED | OUTPATIENT
Start: 2024-07-12 | End: 2024-07-12 | Stop reason: SURG

## 2024-07-12 RX ORDER — METOPROLOL TARTRATE 1 MG/ML
1 INJECTION, SOLUTION INTRAVENOUS
Status: DISCONTINUED | OUTPATIENT
Start: 2024-07-12 | End: 2024-07-12 | Stop reason: HOSPADM

## 2024-07-12 RX ORDER — NITROFURANTOIN 25; 75 MG/1; MG/1
100 CAPSULE ORAL 2 TIMES DAILY
COMMUNITY

## 2024-07-12 RX ORDER — ONDANSETRON 2 MG/ML
4 INJECTION INTRAMUSCULAR; INTRAVENOUS
Status: DISCONTINUED | OUTPATIENT
Start: 2024-07-12 | End: 2024-07-12 | Stop reason: HOSPADM

## 2024-07-12 RX ORDER — LABETALOL HYDROCHLORIDE 5 MG/ML
5 INJECTION, SOLUTION INTRAVENOUS
Status: DISCONTINUED | OUTPATIENT
Start: 2024-07-12 | End: 2024-07-12 | Stop reason: HOSPADM

## 2024-07-12 RX ORDER — MIDAZOLAM HYDROCHLORIDE 1 MG/ML
INJECTION INTRAMUSCULAR; INTRAVENOUS PRN
Status: DISCONTINUED | OUTPATIENT
Start: 2024-07-12 | End: 2024-07-12 | Stop reason: SURG

## 2024-07-12 RX ORDER — HALOPERIDOL 5 MG/ML
1 INJECTION INTRAMUSCULAR
Status: DISCONTINUED | OUTPATIENT
Start: 2024-07-12 | End: 2024-07-12 | Stop reason: HOSPADM

## 2024-07-12 RX ORDER — LIDOCAINE HYDROCHLORIDE 20 MG/ML
INJECTION, SOLUTION EPIDURAL; INFILTRATION; INTRACAUDAL; PERINEURAL PRN
Status: DISCONTINUED | OUTPATIENT
Start: 2024-07-12 | End: 2024-07-12 | Stop reason: SURG

## 2024-07-12 RX ORDER — PHENAZOPYRIDINE HYDROCHLORIDE 200 MG/1
200 TABLET, FILM COATED ORAL 3 TIMES DAILY PRN
Qty: 6 TABLET | Refills: 0 | Status: SHIPPED | OUTPATIENT
Start: 2024-07-12 | End: 2024-07-14

## 2024-07-12 RX ORDER — HYDROMORPHONE HYDROCHLORIDE 1 MG/ML
0.2 INJECTION, SOLUTION INTRAMUSCULAR; INTRAVENOUS; SUBCUTANEOUS
Status: DISCONTINUED | OUTPATIENT
Start: 2024-07-12 | End: 2024-07-12 | Stop reason: HOSPADM

## 2024-07-12 RX ORDER — ONDANSETRON 2 MG/ML
INJECTION INTRAMUSCULAR; INTRAVENOUS PRN
Status: DISCONTINUED | OUTPATIENT
Start: 2024-07-12 | End: 2024-07-12 | Stop reason: SURG

## 2024-07-12 RX ORDER — HYDRALAZINE HYDROCHLORIDE 20 MG/ML
5 INJECTION INTRAMUSCULAR; INTRAVENOUS
Status: DISCONTINUED | OUTPATIENT
Start: 2024-07-12 | End: 2024-07-12 | Stop reason: HOSPADM

## 2024-07-12 RX ORDER — OXYCODONE HCL 5 MG/5 ML
5 SOLUTION, ORAL ORAL
Status: DISCONTINUED | OUTPATIENT
Start: 2024-07-12 | End: 2024-07-12 | Stop reason: HOSPADM

## 2024-07-12 RX ORDER — DEXAMETHASONE SODIUM PHOSPHATE 4 MG/ML
INJECTION, SOLUTION INTRA-ARTICULAR; INTRALESIONAL; INTRAMUSCULAR; INTRAVENOUS; SOFT TISSUE PRN
Status: DISCONTINUED | OUTPATIENT
Start: 2024-07-12 | End: 2024-07-12 | Stop reason: SURG

## 2024-07-12 RX ORDER — NAPROXEN SODIUM 220 MG
440 TABLET ORAL 2 TIMES DAILY PRN
COMMUNITY

## 2024-07-12 RX ORDER — METRONIDAZOLE 7.5 MG/G
1 GEL VAGINAL
COMMUNITY

## 2024-07-12 RX ORDER — CEFAZOLIN SODIUM 1 G/3ML
INJECTION, POWDER, FOR SOLUTION INTRAMUSCULAR; INTRAVENOUS PRN
Status: DISCONTINUED | OUTPATIENT
Start: 2024-07-12 | End: 2024-07-12 | Stop reason: SURG

## 2024-07-12 RX ORDER — HYDROMORPHONE HYDROCHLORIDE 1 MG/ML
0.1 INJECTION, SOLUTION INTRAMUSCULAR; INTRAVENOUS; SUBCUTANEOUS
Status: DISCONTINUED | OUTPATIENT
Start: 2024-07-12 | End: 2024-07-12 | Stop reason: HOSPADM

## 2024-07-12 RX ORDER — FLUCONAZOLE 150 MG/1
150 TABLET ORAL
COMMUNITY

## 2024-07-12 RX ORDER — EPHEDRINE SULFATE 50 MG/ML
5 INJECTION, SOLUTION INTRAVENOUS
Status: DISCONTINUED | OUTPATIENT
Start: 2024-07-12 | End: 2024-07-12 | Stop reason: HOSPADM

## 2024-07-12 RX ORDER — OXYCODONE HCL 5 MG/5 ML
10 SOLUTION, ORAL ORAL
Status: DISCONTINUED | OUTPATIENT
Start: 2024-07-12 | End: 2024-07-12 | Stop reason: HOSPADM

## 2024-07-12 RX ORDER — HYDROMORPHONE HYDROCHLORIDE 1 MG/ML
0.4 INJECTION, SOLUTION INTRAMUSCULAR; INTRAVENOUS; SUBCUTANEOUS
Status: DISCONTINUED | OUTPATIENT
Start: 2024-07-12 | End: 2024-07-12 | Stop reason: HOSPADM

## 2024-07-12 RX ADMIN — MIDAZOLAM HYDROCHLORIDE 2 MG: 2 INJECTION, SOLUTION INTRAMUSCULAR; INTRAVENOUS at 17:45

## 2024-07-12 RX ADMIN — LIDOCAINE HYDROCHLORIDE 100 MG: 20 INJECTION, SOLUTION EPIDURAL; INFILTRATION; INTRACAUDAL at 17:48

## 2024-07-12 RX ADMIN — SODIUM CHLORIDE, POTASSIUM CHLORIDE, SODIUM LACTATE AND CALCIUM CHLORIDE: 600; 310; 30; 20 INJECTION, SOLUTION INTRAVENOUS at 14:58

## 2024-07-12 RX ADMIN — DEXAMETHASONE SODIUM PHOSPHATE 8 MG: 4 INJECTION INTRA-ARTICULAR; INTRALESIONAL; INTRAMUSCULAR; INTRAVENOUS; SOFT TISSUE at 17:53

## 2024-07-12 RX ADMIN — ROCURONIUM BROMIDE 20 MG: 10 INJECTION, SOLUTION INTRAVENOUS at 18:03

## 2024-07-12 RX ADMIN — FENTANYL CITRATE 100 MCG: 50 INJECTION, SOLUTION INTRAMUSCULAR; INTRAVENOUS at 17:48

## 2024-07-12 RX ADMIN — CEFAZOLIN 2 G: 1 INJECTION, POWDER, FOR SOLUTION INTRAMUSCULAR; INTRAVENOUS at 17:48

## 2024-07-12 RX ADMIN — ONDANSETRON 4 MG: 2 INJECTION INTRAMUSCULAR; INTRAVENOUS at 17:53

## 2024-07-12 RX ADMIN — PROPOFOL 200 MG: 10 INJECTION, EMULSION INTRAVENOUS at 17:48

## 2024-07-12 RX ADMIN — DIPHENHYDRAMINE HYDROCHLORIDE 25 MG: 50 INJECTION, SOLUTION INTRAMUSCULAR; INTRAVENOUS at 17:53

## 2024-07-12 ASSESSMENT — FIBROSIS 4 INDEX: FIB4 SCORE: 0.82

## 2024-07-12 ASSESSMENT — PAIN SCALES - GENERAL: PAIN_LEVEL: 0

## 2024-08-09 ENCOUNTER — OFFICE VISIT (OUTPATIENT)
Dept: SLEEP MEDICINE | Facility: MEDICAL CENTER | Age: 39
End: 2024-08-09
Attending: PREVENTIVE MEDICINE
Payer: MEDICARE

## 2024-08-09 VITALS
DIASTOLIC BLOOD PRESSURE: 78 MMHG | RESPIRATION RATE: 16 BRPM | HEART RATE: 76 BPM | BODY MASS INDEX: 34.15 KG/M2 | HEIGHT: 64 IN | WEIGHT: 200 LBS | SYSTOLIC BLOOD PRESSURE: 118 MMHG | OXYGEN SATURATION: 94 %

## 2024-08-09 DIAGNOSIS — G47.33 OSA (OBSTRUCTIVE SLEEP APNEA): ICD-10-CM

## 2024-08-09 PROCEDURE — 99213 OFFICE O/P EST LOW 20 MIN: CPT | Performed by: PREVENTIVE MEDICINE

## 2024-08-09 PROCEDURE — 3074F SYST BP LT 130 MM HG: CPT | Performed by: PREVENTIVE MEDICINE

## 2024-08-09 PROCEDURE — 3078F DIAST BP <80 MM HG: CPT | Performed by: PREVENTIVE MEDICINE

## 2024-08-09 PROCEDURE — 99214 OFFICE O/P EST MOD 30 MIN: CPT | Performed by: PREVENTIVE MEDICINE

## 2024-08-09 ASSESSMENT — FIBROSIS 4 INDEX: FIB4 SCORE: 0.75

## 2024-08-09 NOTE — PROGRESS NOTES
"CHIEF COMPLIANT: \"How did I do on my sleep study?\"   COLLATERAL:  [place]  LAST SEEN:  Dr. Pool on 5/8/2024  HISTORY OF PRESENT ILLNESS:  Annamaria Babin is a 39 y.o.female who is here for sleep study results.     Sleep study results:   TYPE:  []HST   [x]In Lab split   []In Lab diagnostic   []In Lab titration  DATE: 5/23/2024  Diagnostic:AHI: 5.75, REM AHI 20.2  Diagnostic  Oxygen Surinder: 81% with 0.8 mins at or under 88%   TREATMENT AHI: 0.4, REM AHI 2.1  TREATMENT Oxygen Surinder: 90% with 0.0 mins at or under 88%   TITRATION (CWP): [x]CPAP, []BiPAP, []ASV, []iVAPS 5-8 CWP    Significant comorbidities and modifying factors: see below     PAST MEDICAL HISTORY:  Past Medical History:   Diagnosis Date    Anemia     iron deficiency    Anesthesia     PONV    Arthritis     bilat sacroliitis/hips    Asthma     prn inhalers    Bronchitis     Daytime sleepiness     Diabetes (HCC)     gestational diabetes    Enlarged liver     Fatty liver disease, nonalcoholic     Gynecological disorder     abnormal uterine bleeding    H/O thyroidectomy 11/14/2013 2010 for goitre.     Hashimoto disease     History of back surgery 11/14/2013    Has had about 5 minimally invasive procedures low back and both hips as SI crest area.      History of strabismus 11/14/2013    Has surgery both eyes for it 1997.      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     Urinary incontinence     stress incontinence      PROBLEM LIST:  Patient Active Problem List    Diagnosis Date Noted    PONV (postoperative nausea and vomiting) 07/12/2024    Primary stress urinary incontinence 07/12/2024    Spondylosis 02/15/2024    Facet " arthritis of lumbar region 02/15/2024    Obesity (BMI 30-39.9) 02/01/2024    Bilateral sacroiliitis (HCC) 02/01/2024    Vaginal bleeding 09/26/2023    RTI (respiratory tract infection) 05/11/2023    Irregular periods 12/20/2022    Ovarian mass, right 12/20/2022    Encounter for annual routine gynecological examination 10/24/2022    Iron deficiency anemia due to chronic blood loss 08/12/2022    Family history of arterial disease 03/18/2022    Severe episode of recurrent major depressive disorder, without psychotic features (HCC) 01/22/2019    Vitamin D insufficiency 10/01/2018    Postoperative hypothyroidism 01/15/2016    Mild intermittent asthma 09/28/2015    Mast cell disease 11/19/2014    Dermatographic urticaria 11/14/2013    Migraine headache 11/14/2013    Hashimoto's thyroiditis 05/17/2012    Thin basement membrane nephropathy 05/17/2012     PAST SOCIAL HISTORY:  Past Surgical History:   Procedure Laterality Date    IN CYSTOURETHROSCOPY N/A 7/12/2024    Procedure: CYSTOSCOPY WITH URETHRAL BULKING AGENT INJECTION;  Surgeon: Jacob Beck M.D.;  Location: SURGERY [facility];  Service: Urology    IRRIGATION & DEBRIDEMENT GENERAL Right 8/22/2022    Procedure: IRRIGATION AND DEBRIDEMENT,RIGHT ANKLE WOUND;  Surgeon: Vernon Gaspar M.D.;  Location: SURGERY [facility];  Service: Orthopedics    EXOSTOSIS EXCISION Right 6/24/2022    Procedure: REMOVAL RIGHT FIBULA EXOSTOSIS;  Surgeon: Vernon Gaspar M.D.;  Location: SURGERY [facility];  Service: Orthopedics    DEBRIDEMENT Right 6/24/2022    Procedure: DEBRIDEMENT RIGHT FIBULA;  Surgeon: Vernon Gaspar M.D.;  Location: SURGERY [facility];  Service: Orthopedics    PELVISCOPY  03/06/2019    Procedure: PELVISCOPY- EUA;  Surgeon: Alesia Krishnamurthy M.D.;  Location: SURGERY SAME DAY [facility];  Service: Gynecology    SALPINGECTOMY Bilateral 03/06/2019    Procedure: SALPINGECTOMY;  Surgeon: Alesia Krishnamurthy M.D.;  Location: SURGERY SAME DAY [facility];  Service: Gynecology    OVARIAN CYSTECTOMY Right 03/06/2019    Procedure: OVARIAN CYSTECTOMY;  Surgeon: Alesia Krishnamurthy M.D.;  Location: SURGERY SAME DAY [facility];  Service: Gynecology    HYSTEROSCOPY WITH MYOSURE N/A 03/06/2019    Procedure: HYSTEROSCOPY WITH MYOSURE-DIAGNOSTIC AND OPERATIVE, EXCISION OF POLYP/FIBROIDS, ENDOMETRIAL CURRETTAGE;  Surgeon: Alesia Krishnamurthy M.D.;  Location: SURGERY SAME DAY [facility];  Service: Gynecology    TUBAL LIGATION  2019    THYROIDECTOMY  2013    CHOLECYSTECTOMY  2011    INGUINAL HERNIA REPAIR  2002    ORIF, ANKLE Right 1997    STRABISMUS REPAIR  1996    TONSILLECTOMY  1995    OTHER      ears     PAST FAMILY HISTORY:  Family History   Problem Relation Age of Onset    Hypertension Mother     Diabetes Mother     GI Disease Mother         hep c    Other Mother         arterial occlusion    Cancer Mother         unknown. ?lung, metastasis    Heart Disease Father     Heart Failure Father     Hypertension Father     Diabetes Father     Other Maternal Aunt         arterial occlusion     SOCIAL HISTORY:  Social History     Socioeconomic History    Marital status:      Spouse name: Not on file    Number of children: Not on file    Years of education: Not on file    Highest education level: Not on file   Occupational History    Not on file   Tobacco Use    Smoking status: Never     Passive exposure: Past    Smokeless tobacco: Never   Vaping Use    Vaping status: Never Used   Substance and Sexual Activity    Alcohol use: Yes     Alcohol/week: 0.0 - 0.6 oz     Comment: Rarely    Drug use: Not Currently     Types: Marijuana     Comment: Edibles    Sexual activity: Yes     Partners: Male     Birth control/protection: Condom     Comment: , 3 kids and one nephew   Other Topics Concern    Not on file   Social History Narrative    Retired, 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 Used to work for      Social Determinants of Health     Financial Resource  Strain: Not on file   Food Insecurity: Not on file   Transportation Needs: Not on file   Physical Activity: Not on file   Stress: Not on file   Social Connections: Not on file   Intimate Partner Violence: Not on file   Housing Stability: Not on file     ALLERGIES: Imuran [azathioprine sodium], Sulfasalazine, Other drug, Plaquenil [hydroxychloroquine sulfate], Hydrocodone, and Other misc  MEDICATIONS:  Current Outpatient Medications   Medication Sig Dispense Refill    fluconazole (DIFLUCAN) 150 MG tablet Take 150 mg by mouth 1 time a day as needed (vaginal yeast infection).      metroNIDAZOLE (METROGEL-VAGINAL) 0.75 % Gel Insert 1 Applicator into the vagina at bedtime. 5 day course started 06/25/2024      nitrofurantoin (MACROBID) 100 MG Cap Take 100 mg by mouth 2 times a day. 5 day course started 06/24/2024      naproxen (ALEVE) 220 MG tablet Take 440 mg by mouth 2 times a day as needed (pain).      diclofenac sodium (VOLTAREN) 1 % Gel Apply 2 g topically 4 times a day as needed (lower back pain).      SYNTHROID 175 MCG Tab TAKE 1 TABLET BY MOUTH EVERY DAY IN THE MORNING ON EMPTY STOMACH FOR 90 DAYS (Patient taking differently: Take 175 mcg by mouth every morning on an empty stomach.) 90 Tablet 1    vitamin D2, Ergocalciferol, (DRISDOL) 1.25 MG (34181 UT) Cap capsule Take 1 tablet by mouth every 7 days (Patient taking differently: Take 50,000 Units by mouth every 7 days.) 12 Capsule 1    sertraline (ZOLOFT) 100 MG Tab Take 1 Tablet by mouth every day. 90 Tablet 3    ibuprofen (MOTRIN) 600 MG Tab Take 1 Tablet by mouth every 6 hours as needed for Fever. 30 Tablet 0    montelukast (SINGULAIR) 10 MG Tab Take 1 Tablet by mouth every day. 90 Tablet 3    cetirizine (ZYRTEC) 10 MG Tab Take 1 Tablet by mouth 2 times a day. 180 Tablet 3    hydrOXYzine HCl (ATARAX) 10 MG Tab TAKE 1 TABLET BY MOUTH THREE TIMES A DAY AS NEEDED (Patient taking differently: Take 10 mg by mouth every 8 hours as needed for Anxiety.) 270 Tablet 1     "albuterol 108 (90 Base) MCG/ACT Aero Soln inhalation aerosol INHALE 2 PUFFS BY MOUTH EVERY 6 HOURS AS NEEDED FOR SHORTNESS OF BREATH (Patient taking differently: Inhale 2 Puffs every 6 hours as needed for Shortness of Breath.) 8.5 Each 6    famotidine (PEPCID) 20 MG Tab Take 1 Tab by mouth 2 times a day. 60 Tab 11     No current facility-administered medications for this visit.    \"CURRENT RX\"    REVIEW OF SYSTEMS:  Constitutional: Denies weight loss, denies chronic daytime fatigue.  See HPI      PHYSICAL EXAM/VITALS:  /78 (BP Location: Left arm, Patient Position: Sitting, BP Cuff Size: Adult)   Pulse 76   Resp 16   Ht 1.633 m (5' 4.3\")   Wt 90.7 kg (200 lb)   SpO2 94%   BMI 34.01 kg/m²   Appearance: Well-nourished, well-developed,  looks stated age, no acute distress  Eyes:   EOMI  ENMT:  WNL  Neck: Supple, trachea midline  Respiratory effort:  No intercostal retractions or use of accessory muscles  Musculoskeletal:  Grossly normal; gait and station normal  Neurologic:  oriented to person, time, place, and purpose; judgement intact  Psychiatric:  No depression, anxiety, agitation      MEDICAL DECISION MAKING:  The medical record was reviewed as it pertains to this referral. This includes records from primary care,consultants notes, referral request, hospital records, labs and imaging. Any available diagnostic and titration nocturnal polysomnograms, home sleep apnea tests, continuous nocturnal oximetry results, multiple sleep latency tests, and recent compliance reports were reviewed with the patient.    ASSESSMENT/PLAN:  Annamaria Babin is a 39 y.o.female who has been diagnosed with RICHELLE.  This patient was given a variety of treatment options.  She elected to go forward with an OAT evaluation.  A referral will be submitted once the patient selects a dentist.        DIAGNOSES :        1. RICHELLE (obstructive sleep apnea)        The risks of untreated sleep apnea were discussed with the patient at length. " Patients with RICHELLE are at increased risk of cardiovascular disease including coronary artery disease, systemic arterial hypertension, pulmonary arterial hypertension, cardiac arrhythmias, and stroke. RICHELLE patients have an increased risk of motor vehicle accidents, type 2 diabetes, chronic kidney disease, and non-alcoholic liver disease. The patient was advised to avoid driving a motor vehicle when drowsy.  Have advised the patient to follow up with the appropriate healthcare practitioners for all other medical problems and issues.    RETURN TO CLINIC: Return oat followup, for With Dr Pool.    My total time spent caring for the patient on the day of the encounter was 40 minutes. This includes time spent on a thorough chart review including other physician notes, all sleep studies, as well as critical labs and pulmonary and cardiac studies.  Additionally, it includes a thorough discussion of good sleep hygiene and stimulus control, as well as  the need for consistency in terms of sleep preparation and practice.    Please note that this dictation was created using voice recognition software.  I have made every reasonable attempt to correct obvious errors, I expect that there are errors of grammar and possibly content that I did not discover before finalizing this note.

## 2024-08-23 ENCOUNTER — OFFICE VISIT (OUTPATIENT)
Dept: UROLOGY | Facility: MEDICAL CENTER | Age: 39
End: 2024-08-23
Payer: MEDICARE

## 2024-08-23 DIAGNOSIS — N39.3 STRESS INCONTINENCE: ICD-10-CM

## 2024-08-23 LAB
POC POST-VOID: 30 ML
POC PRE-VOID: NORMAL

## 2024-08-23 PROCEDURE — 99212 OFFICE O/P EST SF 10 MIN: CPT | Performed by: STUDENT IN AN ORGANIZED HEALTH CARE EDUCATION/TRAINING PROGRAM

## 2024-08-23 PROCEDURE — 51798 US URINE CAPACITY MEASURE: CPT | Performed by: STUDENT IN AN ORGANIZED HEALTH CARE EDUCATION/TRAINING PROGRAM

## 2024-08-23 NOTE — PROGRESS NOTES
Subjective  Annamaria Babin is a 39 y.o. female who presents today for post-operative follow up.     Procedure: cystoscopy with bulkamid injection     Procedure date: 7/12/2024     Post-op course: She has been doing very well since surgery. She is still wearing a liner for security but it has been dry. She has noticed a very small volume leak with the 3rd or 4th cough but otherwise does not have any incontinence. No dysuria or hematuria. She feels she is emptying her bladder well.     Family History   Problem Relation Age of Onset    Hypertension Mother     Diabetes Mother     GI Disease Mother         hep c    Other Mother         arterial occlusion    Cancer Mother         unknown. ?lung, metastasis    Heart Disease Father     Heart Failure Father     Hypertension Father     Diabetes Father     Other Maternal Aunt         arterial occlusion       Social History     Socioeconomic History    Marital status:      Spouse name: Not on file    Number of children: Not on file    Years of education: Not on file    Highest education level: Not on file   Occupational History    Not on file   Tobacco Use    Smoking status: Never     Passive exposure: Past    Smokeless tobacco: Never   Vaping Use    Vaping status: Never Used   Substance and Sexual Activity    Alcohol use: Yes     Alcohol/week: 0.0 - 0.6 oz     Comment: Rarely    Drug use: Not Currently     Types: Marijuana     Comment: Edibles    Sexual activity: Yes     Partners: Male     Birth control/protection: Condom     Comment: , 3 kids and one nephew   Other Topics Concern    Not on file   Social History Narrative    Retired, 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 Used to work for      Social Determinants of Health     Financial Resource Strain: Not on file   Food Insecurity: Not on file   Transportation Needs: Not on file   Physical Activity: Not on file   Stress: Not on file   Social Connections: Not on file   Intimate Partner Violence: Not on file    Housing Stability: Not on file       Past Surgical History:   Procedure Laterality Date    UT CYSTOURETHROSCOPY N/A 7/12/2024    Procedure: CYSTOSCOPY WITH URETHRAL BULKING AGENT INJECTION;  Surgeon: Jacob Beck M.D.;  Location: SURGERY [facility];  Service: Urology    IRRIGATION & DEBRIDEMENT GENERAL Right 8/22/2022    Procedure: IRRIGATION AND DEBRIDEMENT,RIGHT ANKLE WOUND;  Surgeon: Vernon Gaspar M.D.;  Location: SURGERY [facility];  Service: Orthopedics    EXOSTOSIS EXCISION Right 6/24/2022    Procedure: REMOVAL RIGHT FIBULA EXOSTOSIS;  Surgeon: Vernon Gaspar M.D.;  Location: SURGERY [facility];  Service: Orthopedics    DEBRIDEMENT Right 6/24/2022    Procedure: DEBRIDEMENT RIGHT FIBULA;  Surgeon: Vernon Gaspar M.D.;  Location: SURGERY [facility];  Service: Orthopedics    PELVISCOPY  03/06/2019    Procedure: PELVISCOPY- EUA;  Surgeon: Alesia Krishnamurthy M.D.;  Location: SURGERY SAME DAY [facility];  Service: Gynecology    SALPINGECTOMY Bilateral 03/06/2019    Procedure: SALPINGECTOMY;  Surgeon: Alesia Krishnamurthy M.D.;  Location: SURGERY SAME DAY [facility];  Service: Gynecology    OVARIAN CYSTECTOMY Right 03/06/2019    Procedure: OVARIAN CYSTECTOMY;  Surgeon: Alesia Krishnamurthy M.D.;  Location: SURGERY SAME DAY [facility];  Service: Gynecology    HYSTEROSCOPY WITH MYOSURE N/A 03/06/2019    Procedure: HYSTEROSCOPY WITH MYOSURE-DIAGNOSTIC AND OPERATIVE, EXCISION OF POLYP/FIBROIDS, ENDOMETRIAL CURRETTAGE;  Surgeon: Alesia Krishnamurthy M.D.;  Location: SURGERY SAME DAY [facility];  Service: Gynecology    TUBAL LIGATION  2019    THYROIDECTOMY  2013    CHOLECYSTECTOMY  2011    INGUINAL HERNIA REPAIR  2002    ORIF, ANKLE Right 1997    STRABISMUS REPAIR  1996    TONSILLECTOMY  1995    OTHER      ears       Past Medical History:   Diagnosis Date    Anemia     iron deficiency    Anesthesia     PONV    Arthritis     bilat sacroliitis/hips    Asthma     prn inhalers    Bronchitis      Daytime sleepiness     Diabetes (HCC)     gestational diabetes    Enlarged liver     Fatty liver disease, nonalcoholic     Gynecological disorder     abnormal uterine bleeding    H/O thyroidectomy 11/14/2013 2010 for goitre.     Hashimoto disease     History of back surgery 11/14/2013    Has had about 5 minimally invasive procedures low back and both hips as SI crest area.      History of strabismus 11/14/2013    Has surgery both eyes for it 1997.      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     Urinary incontinence     stress incontinence       Current Outpatient Medications   Medication Sig Dispense Refill    fluconazole (DIFLUCAN) 150 MG tablet Take 150 mg by mouth 1 time a day as needed (vaginal yeast infection).      metroNIDAZOLE (METROGEL-VAGINAL) 0.75 % Gel Insert 1 Applicator into the vagina at bedtime. 5 day course started 06/25/2024      nitrofurantoin (MACROBID) 100 MG Cap Take 100 mg by mouth 2 times a day. 5 day course started 06/24/2024      naproxen (ALEVE) 220 MG tablet Take 440 mg by mouth 2 times a day as needed (pain).      diclofenac sodium (VOLTAREN) 1 % Gel Apply 2 g topically 4 times a day as needed (lower back pain).      SYNTHROID 175 MCG Tab TAKE 1 TABLET BY MOUTH EVERY DAY IN THE MORNING ON EMPTY STOMACH FOR 90 DAYS (Patient taking differently: Take 175 mcg by mouth every morning on an empty stomach.) 90 Tablet 1    vitamin D2, Ergocalciferol, (DRISDOL) 1.25 MG (64495 UT) Cap capsule Take 1 tablet by mouth every 7 days (Patient taking differently: Take 50,000 Units by mouth every 7 days.) 12 Capsule 1    sertraline (ZOLOFT) 100 MG Tab Take 1 Tablet  "by mouth every day. 90 Tablet 3    ibuprofen (MOTRIN) 600 MG Tab Take 1 Tablet by mouth every 6 hours as needed for Fever. 30 Tablet 0    montelukast (SINGULAIR) 10 MG Tab Take 1 Tablet by mouth every day. 90 Tablet 3    cetirizine (ZYRTEC) 10 MG Tab Take 1 Tablet by mouth 2 times a day. 180 Tablet 3    hydrOXYzine HCl (ATARAX) 10 MG Tab TAKE 1 TABLET BY MOUTH THREE TIMES A DAY AS NEEDED (Patient taking differently: Take 10 mg by mouth every 8 hours as needed for Anxiety.) 270 Tablet 1    albuterol 108 (90 Base) MCG/ACT Aero Soln inhalation aerosol INHALE 2 PUFFS BY MOUTH EVERY 6 HOURS AS NEEDED FOR SHORTNESS OF BREATH (Patient taking differently: Inhale 2 Puffs every 6 hours as needed for Shortness of Breath.) 8.5 Each 6    famotidine (PEPCID) 20 MG Tab Take 1 Tab by mouth 2 times a day. 60 Tab 11     No current facility-administered medications for this visit.       Allergies   Allergen Reactions    Imuran [Azathioprine Sodium] Unspecified     Liver enzymes rise significantly    Sulfasalazine Hives    Other Drug Hives     \"Iv contrast\"    Plaquenil [Hydroxychloroquine Sulfate] Hives    Hydrocodone Unspecified     itchy    Other Misc Itching     Tape  Paper tape and tagaderm is ok       Objective  There were no vitals taken for this visit.  Physical Exam  Constitutional:       Appearance: Normal appearance.   HENT:      Head: Normocephalic and atraumatic.   Pulmonary:      Effort: Pulmonary effort is normal.   Skin:     General: Skin is warm and dry.   Neurological:      General: No focal deficit present.      Mental Status: She is alert.   Psychiatric:         Mood and Affect: Mood normal.         Behavior: Behavior normal.         Labs: none    Imaging:     Results for orders placed or performed in visit on 08/23/24   POCT Bladder Scan   Result Value    POC Pre-Void     POC Post-Void 30         Assessment    Mrs. Babin is a 39 year old woman s/p cystoscopy with bulkamid injection for FRANCK on 7/12/2024. She has " small volume incontinence occasionally with coughing three or four times in a row. Otherwise her liner has been dry. For the mild residual FRANCK I recommend pelvic floor physical therapy as this would help treat her dyspareunia as well. Overall she is very satisfied with the surgery.    Plan    1. Stress incontinence  - Referral to Physical Therapy  - POCT Bladder Scan    Referral to pelvic floor physical therapy  RTC 3 months

## 2024-10-02 DIAGNOSIS — E06.3 HASHIMOTO'S THYROIDITIS: ICD-10-CM

## 2024-10-03 RX ORDER — LEVOTHYROXINE SODIUM 175 MCG
TABLET ORAL
Qty: 90 TABLET | Refills: 1 | Status: SHIPPED | OUTPATIENT
Start: 2024-10-03 | End: 2024-10-14 | Stop reason: SDUPTHER

## 2024-10-08 ENCOUNTER — OFFICE VISIT (OUTPATIENT)
Dept: MEDICAL GROUP | Facility: MEDICAL CENTER | Age: 39
End: 2024-10-08
Payer: MEDICARE

## 2024-10-08 VITALS
HEART RATE: 66 BPM | DIASTOLIC BLOOD PRESSURE: 82 MMHG | WEIGHT: 172.4 LBS | BODY MASS INDEX: 28.72 KG/M2 | SYSTOLIC BLOOD PRESSURE: 124 MMHG | OXYGEN SATURATION: 98 % | HEIGHT: 65 IN | TEMPERATURE: 98.4 F

## 2024-10-08 DIAGNOSIS — J01.00 ACUTE NON-RECURRENT MAXILLARY SINUSITIS: ICD-10-CM

## 2024-10-08 PROCEDURE — 3074F SYST BP LT 130 MM HG: CPT | Performed by: NURSE PRACTITIONER

## 2024-10-08 PROCEDURE — 3079F DIAST BP 80-89 MM HG: CPT | Performed by: NURSE PRACTITIONER

## 2024-10-08 PROCEDURE — 99214 OFFICE O/P EST MOD 30 MIN: CPT | Performed by: NURSE PRACTITIONER

## 2024-10-08 RX ORDER — METHYLPREDNISOLONE 4 MG/1
TABLET ORAL
Qty: 21 TABLET | Refills: 0 | Status: SHIPPED | OUTPATIENT
Start: 2024-10-08

## 2024-10-08 RX ORDER — FLUCONAZOLE 150 MG/1
150 TABLET ORAL DAILY
Qty: 1 TABLET | Refills: 0 | Status: SHIPPED | OUTPATIENT
Start: 2024-10-08

## 2024-10-08 ASSESSMENT — FIBROSIS 4 INDEX: FIB4 SCORE: 0.75

## 2024-10-14 DIAGNOSIS — E06.3 HASHIMOTO'S THYROIDITIS: ICD-10-CM

## 2024-10-14 RX ORDER — LEVOTHYROXINE SODIUM 175 MCG
TABLET ORAL
Qty: 90 TABLET | Refills: 1 | Status: SHIPPED | OUTPATIENT
Start: 2024-10-14

## 2024-11-13 ENCOUNTER — OFFICE VISIT (OUTPATIENT)
Dept: MEDICAL GROUP | Facility: MEDICAL CENTER | Age: 39
End: 2024-11-13
Payer: MEDICARE

## 2024-11-13 VITALS
WEIGHT: 172.4 LBS | SYSTOLIC BLOOD PRESSURE: 130 MMHG | HEIGHT: 65 IN | DIASTOLIC BLOOD PRESSURE: 70 MMHG | BODY MASS INDEX: 28.72 KG/M2 | TEMPERATURE: 97.8 F | HEART RATE: 69 BPM | OXYGEN SATURATION: 97 %

## 2024-11-13 DIAGNOSIS — J01.00 ACUTE NON-RECURRENT MAXILLARY SINUSITIS: ICD-10-CM

## 2024-11-13 DIAGNOSIS — J45.20 MILD INTERMITTENT ASTHMA WITHOUT COMPLICATION: ICD-10-CM

## 2024-11-13 DIAGNOSIS — H66.90 ACUTE OTITIS MEDIA, UNSPECIFIED OTITIS MEDIA TYPE: ICD-10-CM

## 2024-11-13 PROCEDURE — 3078F DIAST BP <80 MM HG: CPT | Performed by: FAMILY MEDICINE

## 2024-11-13 PROCEDURE — 99214 OFFICE O/P EST MOD 30 MIN: CPT | Performed by: FAMILY MEDICINE

## 2024-11-13 PROCEDURE — 3075F SYST BP GE 130 - 139MM HG: CPT | Performed by: FAMILY MEDICINE

## 2024-11-13 RX ORDER — METHYLPREDNISOLONE 4 MG/1
TABLET ORAL
Qty: 21 TABLET | Refills: 0 | Status: SHIPPED | OUTPATIENT
Start: 2024-11-13

## 2024-11-13 RX ORDER — FLUCONAZOLE 150 MG/1
150 TABLET ORAL DAILY
Qty: 1 TABLET | Refills: 0 | Status: SHIPPED | OUTPATIENT
Start: 2024-11-13

## 2024-11-13 RX ORDER — DOXYCYCLINE HYCLATE 100 MG
100 TABLET ORAL 2 TIMES DAILY
Qty: 14 TABLET | Refills: 0 | Status: SHIPPED | OUTPATIENT
Start: 2024-11-13 | End: 2024-11-20

## 2024-11-13 RX ORDER — ALBUTEROL SULFATE 90 UG/1
2 INHALANT RESPIRATORY (INHALATION) EVERY 6 HOURS PRN
Qty: 8.5 EACH | Refills: 0 | Status: SHIPPED | OUTPATIENT
Start: 2024-11-13

## 2024-11-13 ASSESSMENT — ENCOUNTER SYMPTOMS
FEVER: 0
SINUS PAIN: 1
COUGH: 1
PALPITATIONS: 0
SPUTUM PRODUCTION: 1
SHORTNESS OF BREATH: 0
CHILLS: 0

## 2024-11-13 ASSESSMENT — FIBROSIS 4 INDEX: FIB4 SCORE: 0.75

## 2024-11-14 NOTE — PROGRESS NOTES
FAMILY MEDICINE VISIT                                                               Assessment/Plan:         Problem List Items Addressed This Visit       Mild intermittent asthma  Chronic condition, stable, continue albuterol inhaler    Relevant Medications    albuterol 108 (90 Base) MCG/ACT Aero Soln inhalation aerosol     Other Visit Diagnoses       Acute non-recurrent maxillary sinusitis        Relevant Medications    fluconazole (DIFLUCAN) 150 MG tablet    methylPREDNISolone (MEDROL DOSEPAK) 4 MG Tablet Therapy Pack    doxycycline (VIBRAMYCIN) 100 MG Tab    Acute otitis media, unspecified otitis media type        Relevant Medications    fluconazole (DIFLUCAN) 150 MG tablet    doxycycline (VIBRAMYCIN) 100 MG Tab     New condition, unstable, start Medrol Dosepak, and doxycycline, Diflucan.  If not getting better then recommend seeing ENT.            Follow up as needed.      Chief complaint::Diagnoses of Mild intermittent asthma without complication, Acute non-recurrent maxillary sinusitis, and Acute otitis media, unspecified otitis media type were pertinent to this visit.    History of present illness: Annamaria Babin is a 39 y.o. female who presented for persistent sinus infection.    She was seen last month for sinus infection and was prescribed antibiotic, steroid pack and Diflucan.  She reported she got better but they went to [place] and she had a viral bug and after that she started having ear pain sinus congestion, cough symptoms.    She has a history of asthma symptoms, requested refill for albuterol inhaler.    Review of systems:     Review of Systems   Constitutional:  Negative for chills and fever.   HENT:  Positive for congestion, ear pain and sinus pain.    Respiratory:  Positive for cough and sputum production. Negative for shortness of breath.    Cardiovascular:  Negative for chest pain, palpitations and leg swelling.        Medications and Allergies:     Current Outpatient Medications  "  Medication Sig Dispense Refill    albuterol 108 (90 Base) MCG/ACT Aero Soln inhalation aerosol Inhale 2 Puffs every 6 hours as needed for Shortness of Breath. 8.5 Each 0    fluconazole (DIFLUCAN) 150 MG tablet Take 1 Tablet by mouth every day. 1 Tablet 0    methylPREDNISolone (MEDROL DOSEPAK) 4 MG Tablet Therapy Pack As directed on the packaging label. 21 Tablet 0    doxycycline (VIBRAMYCIN) 100 MG Tab Take 1 Tablet by mouth 2 times a day for 7 days. 14 Tablet 0    SYNTHROID 175 MCG Tab TAKE 1 TABLET BY MOUTH EVERY DAY IN THE MORNING ON EMPTY STOMACH FOR 90 DAYS 90 Tablet 1    fluconazole (DIFLUCAN) 150 MG tablet Take 150 mg by mouth 1 time a day as needed (vaginal yeast infection).      metroNIDAZOLE (METROGEL-VAGINAL) 0.75 % Gel Insert 1 Applicator into the vagina at bedtime. 5 day course started 06/25/2024      naproxen (ALEVE) 220 MG tablet Take 440 mg by mouth 2 times a day as needed (pain).      diclofenac sodium (VOLTAREN) 1 % Gel Apply 2 g topically 4 times a day as needed (lower back pain).      vitamin D2, Ergocalciferol, (DRISDOL) 1.25 MG (79613 UT) Cap capsule Take 1 tablet by mouth every 7 days (Patient taking differently: Take 50,000 Units by mouth every 7 days.) 12 Capsule 1    ibuprofen (MOTRIN) 600 MG Tab Take 1 Tablet by mouth every 6 hours as needed for Fever. 30 Tablet 0    montelukast (SINGULAIR) 10 MG Tab Take 1 Tablet by mouth every day. 90 Tablet 3    cetirizine (ZYRTEC) 10 MG Tab Take 1 Tablet by mouth 2 times a day. 180 Tablet 3    hydrOXYzine HCl (ATARAX) 10 MG Tab TAKE 1 TABLET BY MOUTH THREE TIMES A DAY AS NEEDED (Patient taking differently: Take 10 mg by mouth every 8 hours as needed for Anxiety.) 270 Tablet 1    famotidine (PEPCID) 20 MG Tab Take 1 Tab by mouth 2 times a day. 60 Tab 11     No current facility-administered medications for this visit.          Vitals:    /70   Pulse 69   Temp 36.6 °C (97.8 °F) (Temporal)   Ht 1.651 m (5' 5\")   Wt 78.2 kg (172 lb 6.4 oz)   " SpO2 97%  Body mass index is 28.69 kg/m².    Physical Exam:     Physical Exam  Constitutional:       Appearance: Normal appearance. She is well-developed and well-groomed.   HENT:      Head: Normocephalic and atraumatic.      Right Ear: External ear normal. Tympanic membrane is erythematous and bulging.      Left Ear: External ear normal. Tympanic membrane is erythematous and bulging.      Mouth/Throat:      Pharynx: Posterior oropharyngeal erythema present.   Eyes:      General:         Right eye: No discharge.         Left eye: No discharge.      Conjunctiva/sclera: Conjunctivae normal.   Cardiovascular:      Rate and Rhythm: Normal rate.   Pulmonary:      Effort: Pulmonary effort is normal. No respiratory distress.   Musculoskeletal:      Cervical back: Neck supple.   Skin:     Findings: No rash.   Neurological:      Mental Status: She is alert.   Psychiatric:         Mood and Affect: Mood and affect normal.         Behavior: Behavior normal.              Please note that this dictation was created using voice recognition software. I have made every reasonable attempt to correct obvious errors, but I expect that there are errors of grammar and possibly content that I did not discover before finalizing the note.

## 2024-12-08 DIAGNOSIS — J45.20 MILD INTERMITTENT ASTHMA WITHOUT COMPLICATION: ICD-10-CM

## 2024-12-09 RX ORDER — ALBUTEROL SULFATE 90 UG/1
2 INHALANT RESPIRATORY (INHALATION) EVERY 6 HOURS PRN
Qty: 8.5 EACH | Refills: 0 | Status: SHIPPED | OUTPATIENT
Start: 2024-12-09

## 2024-12-23 ENCOUNTER — OFFICE VISIT (OUTPATIENT)
Dept: MEDICAL GROUP | Facility: PHYSICIAN GROUP | Age: 39
End: 2024-12-23
Payer: MEDICARE

## 2024-12-23 ENCOUNTER — HOSPITAL ENCOUNTER (OUTPATIENT)
Facility: MEDICAL CENTER | Age: 39
End: 2024-12-23
Attending: NURSE PRACTITIONER
Payer: MEDICARE

## 2024-12-23 VITALS
OXYGEN SATURATION: 99 % | DIASTOLIC BLOOD PRESSURE: 72 MMHG | SYSTOLIC BLOOD PRESSURE: 110 MMHG | HEIGHT: 65 IN | TEMPERATURE: 98.6 F | WEIGHT: 176 LBS | BODY MASS INDEX: 29.32 KG/M2 | HEART RATE: 74 BPM

## 2024-12-23 DIAGNOSIS — Z11.3 SCREEN FOR STD (SEXUALLY TRANSMITTED DISEASE): ICD-10-CM

## 2024-12-23 DIAGNOSIS — H69.91 EUSTACHIAN TUBE DISORDER, RIGHT: ICD-10-CM

## 2024-12-23 PROCEDURE — 3078F DIAST BP <80 MM HG: CPT | Performed by: NURSE PRACTITIONER

## 2024-12-23 PROCEDURE — 87591 N.GONORRHOEAE DNA AMP PROB: CPT

## 2024-12-23 PROCEDURE — 3074F SYST BP LT 130 MM HG: CPT | Performed by: NURSE PRACTITIONER

## 2024-12-23 PROCEDURE — 87491 CHLMYD TRACH DNA AMP PROBE: CPT

## 2024-12-23 PROCEDURE — 99213 OFFICE O/P EST LOW 20 MIN: CPT | Performed by: NURSE PRACTITIONER

## 2024-12-23 ASSESSMENT — FIBROSIS 4 INDEX: FIB4 SCORE: 0.75

## 2024-12-24 ENCOUNTER — HOSPITAL ENCOUNTER (OUTPATIENT)
Dept: LAB | Facility: MEDICAL CENTER | Age: 39
End: 2024-12-24
Attending: NURSE PRACTITIONER
Payer: MEDICARE

## 2024-12-24 DIAGNOSIS — Z11.3 SCREEN FOR STD (SEXUALLY TRANSMITTED DISEASE): ICD-10-CM

## 2024-12-24 LAB
HCV AB SER QL: NORMAL
HIV 1+2 AB+HIV1 P24 AG SERPL QL IA: NORMAL
T PALLIDUM AB SER QL IA: NORMAL

## 2024-12-24 PROCEDURE — 86803 HEPATITIS C AB TEST: CPT

## 2024-12-24 PROCEDURE — 86780 TREPONEMA PALLIDUM: CPT

## 2024-12-24 PROCEDURE — 87389 HIV-1 AG W/HIV-1&-2 AB AG IA: CPT

## 2024-12-24 PROCEDURE — 36415 COLL VENOUS BLD VENIPUNCTURE: CPT
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
## 2024-12-26 ENCOUNTER — APPOINTMENT (OUTPATIENT)
Dept: MEDICAL GROUP | Facility: MEDICAL CENTER | Age: 39
End: 2024-12-26
Payer: MEDICARE

## 2025-01-07 ENCOUNTER — HOSPITAL ENCOUNTER (OUTPATIENT)
Dept: LAB | Facility: MEDICAL CENTER | Age: 40
End: 2025-01-07
Attending: INTERNAL MEDICINE
Payer: MEDICARE

## 2025-01-07 PROCEDURE — 36415 COLL VENOUS BLD VENIPUNCTURE: CPT

## 2025-01-07 PROCEDURE — 84146 ASSAY OF PROLACTIN: CPT

## 2025-01-07 PROCEDURE — 83001 ASSAY OF GONADOTROPIN (FSH): CPT

## 2025-01-07 PROCEDURE — 82306 VITAMIN D 25 HYDROXY: CPT

## 2025-01-07 PROCEDURE — 84270 ASSAY OF SEX HORMONE GLOBUL: CPT

## 2025-01-07 PROCEDURE — 83002 ASSAY OF GONADOTROPIN (LH): CPT

## 2025-01-07 PROCEDURE — 84443 ASSAY THYROID STIM HORMONE: CPT

## 2025-01-07 PROCEDURE — 84481 FREE ASSAY (FT-3): CPT

## 2025-01-07 PROCEDURE — 84403 ASSAY OF TOTAL TESTOSTERONE: CPT

## 2025-01-07 PROCEDURE — 84402 ASSAY OF FREE TESTOSTERONE: CPT

## 2025-01-07 PROCEDURE — 84439 ASSAY OF FREE THYROXINE: CPT

## 2025-01-07 PROCEDURE — 82670 ASSAY OF TOTAL ESTRADIOL: CPT

## 2025-01-08 LAB
25(OH)D3 SERPL-MCNC: 57 NG/ML (ref 30–100)
ESTRADIOL SERPL-MCNC: 34.3 PG/ML
FSH SERPL-ACNC: 9.2 MIU/ML
LH SERPL-ACNC: 5.7 IU/L
PROLACTIN SERPL-MCNC: 7.54 NG/ML (ref 2.8–26)
T3FREE SERPL-MCNC: 3.47 PG/ML (ref 2–4.4)
T4 FREE SERPL-MCNC: 1.45 NG/DL (ref 0.93–1.7)
TSH SERPL-ACNC: 0.01 UIU/ML (ref 0.35–5.5)

## 2025-01-11 LAB
SHBG SERPL-SCNC: 83 NMOL/L (ref 25–122)
TESTOST FREE SERPL-MCNC: 1.4 PG/ML (ref 1.3–9.2)
TESTOST SERPL-MCNC: 15 NG/DL (ref 9–55)

## 2025-03-06 ENCOUNTER — TELEPHONE (OUTPATIENT)
Dept: HEALTH INFORMATION MANAGEMENT | Facility: OTHER | Age: 40
End: 2025-03-06
Payer: MEDICARE

## 2025-05-19 ENCOUNTER — OFFICE VISIT (OUTPATIENT)
Dept: MEDICAL GROUP | Facility: PHYSICIAN GROUP | Age: 40
End: 2025-05-19
Payer: MEDICARE

## 2025-05-19 ENCOUNTER — RESULTS FOLLOW-UP (OUTPATIENT)
Dept: MEDICAL GROUP | Facility: PHYSICIAN GROUP | Age: 40
End: 2025-05-19

## 2025-05-19 ENCOUNTER — HOSPITAL ENCOUNTER (OUTPATIENT)
Facility: MEDICAL CENTER | Age: 40
End: 2025-05-19
Attending: NURSE PRACTITIONER
Payer: MEDICARE

## 2025-05-19 VITALS
DIASTOLIC BLOOD PRESSURE: 68 MMHG | WEIGHT: 166.01 LBS | HEIGHT: 65 IN | BODY MASS INDEX: 27.66 KG/M2 | TEMPERATURE: 98.9 F | OXYGEN SATURATION: 98 % | HEART RATE: 68 BPM | SYSTOLIC BLOOD PRESSURE: 108 MMHG

## 2025-05-19 DIAGNOSIS — F33.0 MILD EPISODE OF RECURRENT MAJOR DEPRESSIVE DISORDER (HCC): ICD-10-CM

## 2025-05-19 DIAGNOSIS — N30.01 ACUTE CYSTITIS WITH HEMATURIA: Primary | ICD-10-CM

## 2025-05-19 DIAGNOSIS — L08.9 SUPERFICIAL SKIN INFECTION: ICD-10-CM

## 2025-05-19 DIAGNOSIS — N89.8 VAGINAL DISCHARGE: ICD-10-CM

## 2025-05-19 DIAGNOSIS — B37.9 ANTIBIOTIC-INDUCED YEAST INFECTION: ICD-10-CM

## 2025-05-19 DIAGNOSIS — T36.95XA ANTIBIOTIC-INDUCED YEAST INFECTION: ICD-10-CM

## 2025-05-19 DIAGNOSIS — B37.9 CANDIDA INFECTION: ICD-10-CM

## 2025-05-19 DIAGNOSIS — N30.01 ACUTE CYSTITIS WITH HEMATURIA: ICD-10-CM

## 2025-05-19 LAB
APPEARANCE UR: CLEAR
BILIRUB UR STRIP-MCNC: NEGATIVE MG/DL
CANDIDA DNA VAG QL PROBE+SIG AMP: POSITIVE
COLOR UR AUTO: YELLOW
G VAGINALIS DNA VAG QL PROBE+SIG AMP: NEGATIVE
GLUCOSE UR STRIP.AUTO-MCNC: NEGATIVE MG/DL
KETONES UR STRIP.AUTO-MCNC: NEGATIVE MG/DL
LEUKOCYTE ESTERASE UR QL STRIP.AUTO: NORMAL
NITRITE UR QL STRIP.AUTO: NEGATIVE
PH UR STRIP.AUTO: 6 [PH] (ref 5–8)
PROT UR QL STRIP: 30 MG/DL
RBC UR QL AUTO: NORMAL
SP GR UR STRIP.AUTO: 1.02
T VAGINALIS DNA VAG QL PROBE+SIG AMP: NEGATIVE
UROBILINOGEN UR STRIP-MCNC: 0.2 MG/DL

## 2025-05-19 PROCEDURE — 3074F SYST BP LT 130 MM HG: CPT | Performed by: NURSE PRACTITIONER

## 2025-05-19 PROCEDURE — 87086 URINE CULTURE/COLONY COUNT: CPT

## 2025-05-19 PROCEDURE — 81002 URINALYSIS NONAUTO W/O SCOPE: CPT | Performed by: NURSE PRACTITIONER

## 2025-05-19 PROCEDURE — 87480 CANDIDA DNA DIR PROBE: CPT

## 2025-05-19 PROCEDURE — 87660 TRICHOMONAS VAGIN DIR PROBE: CPT

## 2025-05-19 PROCEDURE — 99214 OFFICE O/P EST MOD 30 MIN: CPT | Performed by: NURSE PRACTITIONER

## 2025-05-19 PROCEDURE — 87510 GARDNER VAG DNA DIR PROBE: CPT

## 2025-05-19 PROCEDURE — 87077 CULTURE AEROBIC IDENTIFY: CPT | Mod: 91

## 2025-05-19 PROCEDURE — 87186 SC STD MICRODIL/AGAR DIL: CPT

## 2025-05-19 PROCEDURE — 3078F DIAST BP <80 MM HG: CPT | Performed by: NURSE PRACTITIONER

## 2025-05-19 RX ORDER — MUPIROCIN 20 MG/G
1 OINTMENT TOPICAL 2 TIMES DAILY
Qty: 22 G | Refills: 0 | Status: SHIPPED | OUTPATIENT
Start: 2025-05-19 | End: 2025-05-26

## 2025-05-19 RX ORDER — NITROFURANTOIN 25; 75 MG/1; MG/1
100 CAPSULE ORAL EVERY 12 HOURS
Qty: 10 CAPSULE | Refills: 0 | Status: SHIPPED | OUTPATIENT
Start: 2025-05-19 | End: 2025-05-22

## 2025-05-19 RX ORDER — TESTOSTERONE CYPIONATE 200 MG/ML
50 INJECTION, SOLUTION INTRAMUSCULAR ONCE
COMMUNITY

## 2025-05-19 RX ORDER — FLUCONAZOLE 150 MG/1
150 TABLET ORAL DAILY
Qty: 2 TABLET | Refills: 0 | Status: SHIPPED | OUTPATIENT
Start: 2025-05-19 | End: 2025-05-21

## 2025-05-19 RX ORDER — CITALOPRAM HYDROBROMIDE 10 MG/1
10 TABLET ORAL DAILY
Qty: 30 TABLET | Refills: 2 | Status: SHIPPED | OUTPATIENT
Start: 2025-05-19

## 2025-05-19 ASSESSMENT — PATIENT HEALTH QUESTIONNAIRE - PHQ9
CLINICAL INTERPRETATION OF PHQ2 SCORE: 2
SUM OF ALL RESPONSES TO PHQ QUESTIONS 1-9: 6
5. POOR APPETITE OR OVEREATING: 0 - NOT AT ALL

## 2025-05-19 ASSESSMENT — FIBROSIS 4 INDEX: FIB4 SCORE: 0.77
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
## 2025-05-19 NOTE — LETTER
[facility]  Sarah Wolf P.A.-C.  [address]  [place] [address]  Fax: [phone]   Authorization for Release/Disclosure of   Protected Health Information   Name: ANNAMARIA BOWMAN : 1985 SSN: [id number]   Address: [address] [address]  [place] [address] Phone:    There are no phone numbers on file.   I authorize the entity listed below to release/disclose the PHI below to:   [facility]/Sarah Wolf P.A.-C. and SHELBY Muñoz   Provider or Entity Name:  [facility]   Address   City, State, [place]   Phone:      Fax:     Reason for request: continuity of care   Information to be released:    [  ] LAST COLONOSCOPY,  including any PATH REPORT and follow-up  [  ] LAST FIT/COLOGUARD RESULT [  ] LAST DEXA  [  ] LAST MAMMOGRAM  [  ] LAST PAP  [  ] LAST LABS [  ] RETINA EXAM REPORT  [  ] IMMUNIZATION RECORDS  [  ] Release all info      [  ] Check here and initial the line next to each item to release ALL health information INCLUDING  _____ Care and treatment for drug and / or alcohol abuse  _____ HIV testing, infection status, or AIDS  _____ Genetic Testing    DATES OF SERVICE OR TIME PERIOD TO BE DISCLOSED: _____________  I understand and acknowledge that:  * This Authorization may be revoked at any time by you in writing, except if your health information has already been used or disclosed.  * Your health information that will be used or disclosed as a result of you signing this authorization could be re-disclosed by the recipient. If this occurs, your re-disclosed health information may no longer be protected by State or Federal laws.  * You may refuse to sign this Authorization. Your refusal will not affect your ability to obtain treatment.  * This Authorization becomes effective upon signing and will  on (date) __________.      If no date is indicated, this Authorization will  one (1) year from the signature date.    Name: Annamaria Bowman  Signature: Date:    5/19/2025     PLEASE FAX REQUESTED RECORDS BACK TO: [phone]

## 2025-05-22 LAB
BACTERIA UR CULT: ABNORMAL
SIGNIFICANT IND 70042: ABNORMAL
SITE SITE: ABNORMAL
SOURCE SOURCE: ABNORMAL

## 2025-05-22 RX ORDER — CEFDINIR 300 MG/1
300 CAPSULE ORAL 2 TIMES DAILY
Qty: 10 CAPSULE | Refills: 0 | Status: SHIPPED | OUTPATIENT
Start: 2025-05-22 | End: 2025-05-27

## 2025-05-23 ENCOUNTER — HOSPITAL ENCOUNTER (OUTPATIENT)
Dept: RADIOLOGY | Facility: MEDICAL CENTER | Age: 40
End: 2025-05-23
Attending: PHYSICIAN ASSISTANT
Payer: MEDICARE

## 2025-05-23 DIAGNOSIS — Z12.31 VISIT FOR SCREENING MAMMOGRAM: ICD-10-CM

## 2025-05-23 PROCEDURE — 77067 SCR MAMMO BI INCL CAD: CPT

## 2025-05-27 RX ORDER — FLUCONAZOLE 150 MG/1
150 TABLET ORAL DAILY
Qty: 1 TABLET | Refills: 0 | Status: SHIPPED | OUTPATIENT
Start: 2025-05-27 | End: 2025-05-28

## 2025-05-28 NOTE — TELEPHONE ENCOUNTER
Requested Prescriptions     Signed Prescriptions Disp Refills    cefdinir (OMNICEF) 300 MG Cap 10 Capsule 0     Sig: Take 1 Capsule by mouth 2 times a day for 5 days.    fluconazole (DIFLUCAN) 150 MG tablet 1 Tablet 0     Sig: Take 1 Tablet by mouth every day for 1 dose.     ESTHELA Muñoz.

## 2025-05-29 ENCOUNTER — RESULTS FOLLOW-UP (OUTPATIENT)
Dept: MEDICAL GROUP | Age: 40
End: 2025-05-29

## 2025-05-29 ENCOUNTER — TELEPHONE (OUTPATIENT)
Dept: MEDICAL GROUP | Age: 40
End: 2025-05-29
Payer: MEDICARE

## 2025-05-29 DIAGNOSIS — R92.8 ABNORMAL MAGNETIC RESONANCE IMAGING OF LEFT BREAST: Primary | ICD-10-CM

## 2025-05-29 NOTE — TELEPHONE ENCOUNTER
Phone Number Called: [phone]     Call outcome: Left detailed message for patient. Informed to call back with any additional questions.    Message: Pt request an imaging order from Sarah but has not been seen in over a year. Called pt and told her that she needs to schedule an appointment in [facility] for Sarah to sign the order.

## 2025-05-30 NOTE — PROGRESS NOTES
May 29, 2025: Screening mammo showed group of calcifications at the 6 o'clock position requiring additional imaging on the left breast.  Diagnostic mammogram and ultrasound have been ordered

## 2025-06-02 ENCOUNTER — OFFICE VISIT (OUTPATIENT)
Dept: UROLOGY | Facility: MEDICAL CENTER | Age: 40
End: 2025-06-02
Payer: MEDICARE

## 2025-06-02 ENCOUNTER — HOSPITAL ENCOUNTER (OUTPATIENT)
Dept: RADIOLOGY | Facility: MEDICAL CENTER | Age: 40
End: 2025-06-02
Attending: STUDENT IN AN ORGANIZED HEALTH CARE EDUCATION/TRAINING PROGRAM
Payer: MEDICARE

## 2025-06-02 ENCOUNTER — HOSPITAL ENCOUNTER (OUTPATIENT)
Facility: MEDICAL CENTER | Age: 40
End: 2025-06-02
Attending: STUDENT IN AN ORGANIZED HEALTH CARE EDUCATION/TRAINING PROGRAM
Payer: MEDICARE

## 2025-06-02 DIAGNOSIS — N30.90 CYSTITIS: ICD-10-CM

## 2025-06-02 DIAGNOSIS — N39.3 STRESS INCONTINENCE: Primary | ICD-10-CM

## 2025-06-02 LAB
POC POST-VOID: 71 ML
POC PRE-VOID: NORMAL

## 2025-06-02 PROCEDURE — 99214 OFFICE O/P EST MOD 30 MIN: CPT | Performed by: STUDENT IN AN ORGANIZED HEALTH CARE EDUCATION/TRAINING PROGRAM

## 2025-06-02 PROCEDURE — 51798 US URINE CAPACITY MEASURE: CPT | Performed by: STUDENT IN AN ORGANIZED HEALTH CARE EDUCATION/TRAINING PROGRAM

## 2025-06-02 PROCEDURE — RXMED WILLOW AMBULATORY MEDICATION CHARGE: Performed by: STUDENT IN AN ORGANIZED HEALTH CARE EDUCATION/TRAINING PROGRAM

## 2025-06-02 PROCEDURE — 87086 URINE CULTURE/COLONY COUNT: CPT

## 2025-06-02 PROCEDURE — 87077 CULTURE AEROBIC IDENTIFY: CPT

## 2025-06-02 PROCEDURE — 87563 M. GENITALIUM AMP PROBE: CPT

## 2025-06-02 PROCEDURE — 76775 US EXAM ABDO BACK WALL LIM: CPT

## 2025-06-02 PROCEDURE — 87798 DETECT AGENT NOS DNA AMP: CPT | Mod: 91

## 2025-06-02 RX ORDER — FLUCONAZOLE 150 MG/1
150 TABLET ORAL ONCE
Qty: 1 TABLET | Refills: 0 | Status: SHIPPED | OUTPATIENT
Start: 2025-06-02 | End: 2025-06-04

## 2025-06-02 RX ORDER — DOXYCYCLINE HYCLATE 100 MG
100 TABLET ORAL 2 TIMES DAILY
Qty: 14 TABLET | Refills: 0 | Status: SHIPPED | OUTPATIENT
Start: 2025-06-02 | End: 2025-06-05

## 2025-06-02 NOTE — PROGRESS NOTES
"Subjective    CHIEF COMPLAINT:    Patient presents to the office today to discuss:    1. Urinary tract infection not responding to antibiotics    PAST UROLOGICAL HISTORY:    Patient previously had Bulkamid procedure for stress urinary incontinence with good results. Had a UTI recently and was prescribed antibiotics but symptoms have not fully resolved.    HPI TODAY 06/02/2025:    - Reports persistent pressure sensation in bladder despite antibiotic treatment. Describes feeling \"like a lead weight sitting there.\" Denies burning, frequency, or urgency.  - Previous urine culture grew two bacteria that should have been sensitive to prescribed antibiotics.  - Reports some improvement in urinary leakage that occurred during infection. Believes leakage was due to strong bladder contractions caused by infection.  - Reports mild right-sided flank pain that was worse at initial presentation but has improved somewhat.  - Reports experiencing hot flashes when symptoms first began.      Family History   Problem Relation Age of Onset    Hypertension Mother     Diabetes Mother     GI Disease Mother         hep c    Other Mother         arterial occlusion    Cancer Mother         unknown. ?lung, metastasis    Heart Disease Father     Heart Failure Father     Hypertension Father     Diabetes Father     Other Maternal Aunt         arterial occlusion       Social History     Socioeconomic History    Marital status:      Spouse name: Not on file    Number of children: Not on file    Years of education: Not on file    Highest education level: Not on file   Occupational History    Not on file   Tobacco Use    Smoking status: Never     Passive exposure: Past    Smokeless tobacco: Never   Vaping Use    Vaping status: Never Used   Substance and Sexual Activity    Alcohol use: Yes     Alcohol/week: 0.0 - 0.6 oz     Comment: Rarely    Drug use: Not Currently     Types: Marijuana     Comment: Edibles    Sexual activity: Yes     " Partners: Male     Birth control/protection: Condom     Comment: , 3 kids and one nephew   Other Topics Concern    Not on file   Social History Narrative    Retired, 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 Used to work for      Social Drivers of Health     Financial Resource Strain: Not on file   Food Insecurity: Not on file   Transportation Needs: Not on file   Physical Activity: Not on file   Stress: Not on file   Social Connections: Not on file   Intimate Partner Violence: Not on file   Housing Stability: Not on file       Past Surgical History:   Procedure Laterality Date    [place] CYSTOURETHROSCOPY N/A 7/12/2024    Procedure: CYSTOSCOPY WITH URETHRAL BULKING AGENT INJECTION;  Surgeon: Jacob Beck M.D.;  Location: SURGERY [facility];  Service: Urology    WOUND IRRIGATION & DEBRIDEMENT Right 8/22/2022    Procedure: IRRIGATION AND DEBRIDEMENT,RIGHT ANKLE WOUND;  Surgeon: Vernon Gaspar M.D.;  Location: SURGERY [facility];  Service: Orthopedics    EXOSTOSIS EXCISION Right 6/24/2022    Procedure: REMOVAL RIGHT FIBULA EXOSTOSIS;  Surgeon: Vernon Gaspar M.D.;  Location: SURGERY [facility];  Service: Orthopedics    DEBRIDEMENT Right 6/24/2022    Procedure: DEBRIDEMENT RIGHT FIBULA;  Surgeon: Vernon Gaspar M.D.;  Location: SURGERY [facility];  Service: Orthopedics    PELVISCOPY  03/06/2019    Procedure: PELVISCOPY- EUA;  Surgeon: Alesia Krishnamurthy M.D.;  Location: SURGERY SAME DAY [facility];  Service: Gynecology    SALPINGECTOMY Bilateral 03/06/2019    Procedure: SALPINGECTOMY;  Surgeon: Alesia Krishnamurthy M.D.;  Location: SURGERY SAME DAY [facility];  Service: Gynecology    OVARIAN CYSTECTOMY Right 03/06/2019    Procedure: OVARIAN CYSTECTOMY;  Surgeon: Alesia Krishnamurthy M.D.;  Location: SURGERY SAME DAY [facility];  Service: Gynecology    HYSTEROSCOPY WITH MYOSURE N/A 03/06/2019    Procedure: HYSTEROSCOPY WITH MYOSURE-DIAGNOSTIC AND OPERATIVE, EXCISION OF POLYP/FIBROIDS,  ENDOMETRIAL CURRETTAGE;  Surgeon: Alesia Krishnamurthy M.D.;  Location: SURGERY SAME DAY [facility];  Service: Gynecology    TUBAL LIGATION  2019    THYROIDECTOMY  2013    CHOLECYSTECTOMY  2011    INGUINAL HERNIA REPAIR  2002    ORIF, ANKLE Right 1997    STRABISMUS REPAIR  1996    TONSILLECTOMY  1995    OTHER      ears       Past Medical History:   Diagnosis Date    Anemia     iron deficiency    Anesthesia     PONV    Arthritis     bilat sacroliitis/hips    Asthma     prn inhalers    Bronchitis     Daytime sleepiness     Diabetes (HCC)     gestational diabetes    Enlarged liver     Fatty liver disease, nonalcoholic     Gynecological disorder     abnormal uterine bleeding    H/O thyroidectomy 11/14/2013 2010 for goitre.     Hashimoto disease     History of back surgery 11/14/2013    Has had about 5 minimally invasive procedures low back and both hips as SI crest area.      History of strabismus 11/14/2013    Has surgery both eyes for it 1997.      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     Urinary incontinence     stress incontinence       Current Outpatient Medications   Medication Sig Dispense Refill    fluconazole (DIFLUCAN) 150 MG tablet Take 1 Tablet by mouth one time for 1 dose. 1 Tablet 0    doxycycline (VIBRAMYCIN) 100 MG Tab Take 1 Tablet by mouth 2 times a day for 7 days. 14 Tablet 0    testosterone cypionate (DEPO-TESTOSTERONE) 200 MG/ML injection Inject 50 mg into the shoulder, thigh, or buttocks one time.      citalopram (CELEXA) 10 MG tablet Take 1 Tablet by mouth every day. 30 Tablet 2    albuterol 108 (90 Base) MCG/ACT Aero Soln inhalation aerosol INHALE 2  "PUFFS BY MOUTH EVERY 6 HOURS AS NEEDED FOR SHORTNESS OF BREATH 8.5 Each 0    SYNTHROID 175 MCG Tab TAKE 1 TABLET BY MOUTH EVERY DAY IN THE MORNING ON EMPTY STOMACH FOR 90 DAYS 90 Tablet 1    naproxen (ALEVE) 220 MG tablet Take 440 mg by mouth 2 times a day as needed (pain).      diclofenac sodium (VOLTAREN) 1 % Gel Apply 2 g topically 4 times a day as needed (lower back pain).      vitamin D2, Ergocalciferol, (DRISDOL) 1.25 MG (69373 UT) Cap capsule Take 1 tablet by mouth every 7 days 12 Capsule 1    ibuprofen (MOTRIN) 600 MG Tab Take 1 Tablet by mouth every 6 hours as needed for Fever. 30 Tablet 0    montelukast (SINGULAIR) 10 MG Tab Take 1 Tablet by mouth every day. 90 Tablet 3    cetirizine (ZYRTEC) 10 MG Tab Take 1 Tablet by mouth 2 times a day. 180 Tablet 3    hydrOXYzine HCl (ATARAX) 10 MG Tab TAKE 1 TABLET BY MOUTH THREE TIMES A DAY AS NEEDED 270 Tablet 1    famotidine (PEPCID) 20 MG Tab Take 1 Tab by mouth 2 times a day. 60 Tab 11     No current facility-administered medications for this visit.       Allergies   Allergen Reactions    Imuran [Azathioprine Sodium] Unspecified     Liver enzymes rise significantly    Sulfasalazine Hives    Other Drug Hives     \"Iv contrast\"    Plaquenil [Hydroxychloroquine Sulfate] Hives    Hydrocodone Unspecified     itchy    Other Misc Itching     Tape  Paper tape and tagaderm is ok       Objective  There were no vitals taken for this visit.  Physical Exam  Constitutional:       Appearance: Normal appearance.   HENT:      Head: Normocephalic and atraumatic.   Pulmonary:      Effort: Pulmonary effort is normal.   Skin:     General: Skin is warm and dry.   Neurological:      General: No focal deficit present.      Mental Status: She is alert.   Psychiatric:         Mood and Affect: Mood normal.         Behavior: Behavior normal.         Labs:   [facility]   Lab Results   Component Value Date/Time    SODIUM 142 07/12/2024 1329    POTASSIUM 3.7 07/12/2024 1329    CHLORIDE 105 07/12/2024 " 1329    CO2 22 07/12/2024 1329    GLUCOSE 89 07/12/2024 1329    BUN 11 07/12/2024 1329    CREATININE 0.61 07/12/2024 1329    CALCIUM 8.9 07/12/2024 1329         Imaging:   Results for orders placed or performed in visit on 06/02/25   POCT Bladder Scan   Result Value    POC Pre-Void     POC Post-Void 71         Assessment    1. Urinary Tract Infection (N39.0)    - Assessment: Persistent UTI symptoms despite treatment with Omnicef. Previous culture showed bacterial growth.  - Plan: Switching to doxycycline 100mg twice daily for 7 days. Will check for standard and atypical bacteria with urine culture and special testing today. Counseled on sun sensitivity with doxycycline - advised to use sunscreen, hat, and long sleeves while taking medication.  - Counseling: Explained that strong bladder contractions during infection can cause temporary urinary leakage despite previous Bulkamid procedure.    2. Rule out Nephrolithiasis (N20.9)    - Assessment: Right-sided flank pain concerning for possible kidney stone.  - Plan: Ordered renal ultrasound (STAT) to evaluate for kidney stones or other pathology. Will consider CT if ultrasound shows concerning findings.      Plan    Problem List Items Addressed This Visit    None  Visit Diagnoses         Stress incontinence    -  Primary      Cystitis        Relevant Orders    URINE CULTURE(NEW)    UROGENITAL UREAPLASMA/MYCOPLASMA, PCR    US-RENAL        ORDERS:    Doxycycline 100mg PO BID x7 days  Fluconazole 150mg PO one-time dose for prophylaxis against yeast infection  Renal ultrasound (STAT)  Urine culture with testing for atypical bacteria  Post-void residual measurement    FOLLOW UP:    Will call with ultrasound results today or tomorrow. Follow up based on findings.    SHORT SUMMARY:    Gentleman with persistent UTI symptoms despite Omnicef treatment, now switched to doxycycline with urine testing. Renal ultrasound ordered to evaluate right-sided flank pain.

## 2025-06-03 ENCOUNTER — PHARMACY VISIT (OUTPATIENT)
Dept: PHARMACY | Facility: MEDICAL CENTER | Age: 40
End: 2025-06-03
Payer: COMMERCIAL

## 2025-06-04 ENCOUNTER — TELEPHONE (OUTPATIENT)
Dept: UROLOGY | Facility: MEDICAL CENTER | Age: 40
End: 2025-06-04

## 2025-06-04 ENCOUNTER — APPOINTMENT (OUTPATIENT)
Dept: UROLOGY | Facility: MEDICAL CENTER | Age: 40
End: 2025-06-04
Payer: MEDICARE

## 2025-06-05 ENCOUNTER — APPOINTMENT (OUTPATIENT)
Dept: MEDICAL GROUP | Age: 40
End: 2025-06-05
Payer: MEDICARE

## 2025-06-05 ENCOUNTER — TELEPHONE (OUTPATIENT)
Dept: UROLOGY | Facility: MEDICAL CENTER | Age: 40
End: 2025-06-05

## 2025-06-05 VITALS
HEIGHT: 65 IN | OXYGEN SATURATION: 98 % | DIASTOLIC BLOOD PRESSURE: 58 MMHG | BODY MASS INDEX: 27.99 KG/M2 | SYSTOLIC BLOOD PRESSURE: 102 MMHG | TEMPERATURE: 97.7 F | WEIGHT: 168 LBS | HEART RATE: 69 BPM

## 2025-06-05 DIAGNOSIS — R92.8 ABNORMAL SCREENING MAMMOGRAM: ICD-10-CM

## 2025-06-05 DIAGNOSIS — F33.2 MAJOR DEPRESSIVE DISORDER, RECURRENT SEVERE WITHOUT PSYCHOTIC FEATURES (HCC): ICD-10-CM

## 2025-06-05 DIAGNOSIS — Z82.41 FAMILY HISTORY OF SUDDEN CARDIAC DEATH: ICD-10-CM

## 2025-06-05 DIAGNOSIS — Z12.83 SKIN CANCER SCREENING: Primary | ICD-10-CM

## 2025-06-05 LAB
M GENITALIUM DNA SPEC QL NAA+PROBE: NOT DETECTED
M HOMINIS DNA SPEC QL NAA+PROBE: NOT DETECTED
SPECIMEN SOURCE: ABNORMAL
U PARVUM DNA SPEC QL NAA+PROBE: DETECTED
U UREALYTICUM DNA SPEC QL NAA+PROBE: NOT DETECTED

## 2025-06-05 PROCEDURE — 3074F SYST BP LT 130 MM HG: CPT | Performed by: PHYSICIAN ASSISTANT

## 2025-06-05 PROCEDURE — 3078F DIAST BP <80 MM HG: CPT | Performed by: PHYSICIAN ASSISTANT

## 2025-06-05 PROCEDURE — 99204 OFFICE O/P NEW MOD 45 MIN: CPT | Performed by: PHYSICIAN ASSISTANT

## 2025-06-05 PROCEDURE — G2211 COMPLEX E/M VISIT ADD ON: HCPCS | Performed by: PHYSICIAN ASSISTANT

## 2025-06-05 ASSESSMENT — FIBROSIS 4 INDEX: FIB4 SCORE: 0.77

## 2025-06-05 NOTE — PROGRESS NOTES
Subjective:     History of Present Illness  The patient presents for evaluation of dense breasts, skin cancer screening, and family history of sudden cardiac death.    She has been diagnosed with dense breasts bilaterally, with a specific focus on the left breast due to the presence of calcifications identified at the 6 o'clock position. A diagnostic mammogram and ultrasound have been ordered to further evaluate these findings. She inquired about the potential impact of breastfeeding on these calcifications and was informed that it is unrelated.    Additionally, she requested a referral to dermatology for a skin cancer screening, citing the presence of a light spot on her skin. She has fair skin and has never had a skin cancer screening.    Her sister passed away at the age of 29 due to sudden cardiac death, which was attributed to [place] spotted fever. This led to the entire family undergoing echocardiograms. She had an echocardiogram in 2010, which yielded negative results. She is considering a referral to cardiology for further evaluation given her family history.    FAMILY HISTORY  Her sister passed away at the age of 29 due to sudden cardiac death, which was attributed to [place] spotted fever.      Current medicines (including changes today)  Current Medications[1]  She  has a past medical history of Anemia, Anesthesia, Arthritis, Asthma, Bronchitis, Daytime sleepiness, Diabetes (HCC), Enlarged liver, Fatty liver disease, nonalcoholic, Gynecological disorder, H/O thyroidectomy (11/14/2013), Hashimoto disease, History of back surgery (11/14/2013), History of strabismus (11/14/2013), Indigestion, Liver disease, Mastocytosis, Morning headache, Pain, PONV (postoperative nausea and vomiting) (7/12/2024), Psychiatric problem, Renal disorder, S/P cholecystectomy (11/14/2013), S/P inguinal hernia repair (11/14/2013), and Urinary incontinence.    ROS   No chest pain, no shortness of breath, no abdominal  "pain  Positive ROS as per HPI.  All other systems reviewed and are negative.     Objective:     /58 (BP Location: Right arm, Patient Position: Sitting, BP Cuff Size: Adult)   Pulse 69   Temp 36.5 °C (97.7 °F) (Temporal)   Ht 1.651 m (5' 5\")   Wt 76.2 kg (168 lb)   SpO2 98%  Body mass index is 27.96 kg/m².   Physical Exam    Constitutional: Alert, no distress.  Skin: Warm, dry, good turgor, no rashes in visible areas.  Eye: Equal, round and reactive, conjunctiva clear, lids normal.  ENMT: Lips without lesions, good dentition, oropharynx clear.  Neck: Trachea midline, no masses, no thyromegaly. No cervical or supraclavicular lymphadenopathy  Respiratory: Unlabored respiratory effort, lungs clear to auscultation, no wheezes, no ronchi.  Cardiovascular: Normal S1, S2, no murmur, no edema.  Abdomen: Soft, non-tender, no masses, no hepatosplenomegaly.  Psych: Alert and oriented x3, normal affect and mood.      Results  Imaging   - Screening mammogram of both breasts: 05/23/2025, Dense breasts and a grouping of calcifications identified at the 6 o'clock position of the left breast.        Assessment and Plan:   The following treatment plan was discussed    Assessment & Plan  1. Abnormal screening mammogram  - The screening mammogram conducted on 05/23/2025 revealed dense breasts with a cluster of calcifications at the 6 o'clock position in the left breast.  - The radiologist categorized this as a category zero finding, indicating no immediate danger but necessitating further examination. A diagnostic mammogram and ultrasound were ordered on 05/28/2025.  - She was reassured that these calcifications are likely benign or genetic in nature, with no evidence suggesting they are a result of nursing.  - A diagnostic mammogram for both breasts and an ultrasound for the left breast have been ordered. She was advised to schedule these tests at her earliest convenience. Upon receipt of the diagnostic mammogram results, " she will be contacted via phone call or [employer] message.    2. Skin cancer screening.  - A referral to dermatology for skin cancer screening has been made.  - She was advised to undergo annual screenings and to monitor any skin spots throughout the year.  - Counseling provided on the importance of regular skin checks given her fair skin.  - Referral to dermatology for comprehensive skin evaluation and screening.    3. Family history of sudden cardiac death.  - Her sister's sudden cardiac death was likely due to [place] spotted fever, which may have led to a blood infection that subsequently caused other complications.  - A referral to cardiology has been made.  - She was advised to consider an echocardiogram for further evaluation.  - Referral to cardiology for expert consultation and potential echocardiogram to ensure cardiovascular health.      () Today's E/M visit is associated with medical care services that serve as the continuing focal point for all needed health care services and/or with medical care services that are part of ongoing care related to a patient's single, serious condition, or a complex condition: This includes furnishing services to patients on an ongoing basis that result in care that is personalized to the patient. The services result in a comprehensive, longitudinal, and continuous relationship with the patient and involve delivery of team-based care that is accessible, coordinated with other practitioners and providers, and integrated with the broader health care landscape.       ORDERS:  1. Skin cancer screening (Primary)    - Referral to Dermatology    2. Abnormal screening mammogram    - MA-DIAGNOSTIC MAMMO BILAT W/TOMOSYNTHESIS W/CAD; Future    3. Family history of sudden cardiac death    - REFERRAL TO CARDIOLOGY    4. Major depressive disorder, recurrent severe without psychotic features (HCC)            Follow Up: 6 months  or prn. Will follow dx mammo results    Please  note that this dictation was created using voice recognition software. I have made every reasonable attempt to correct obvious errors, but I expect that there are errors of grammar and possibly content that I did not discover before finalizing the note.      Attestation      Verbal consent was acquired by the patient to use [employer] ambient listening note generation during this visit Yes                  [1]   Current Outpatient Medications   Medication Sig Dispense Refill    testosterone cypionate (DEPO-TESTOSTERONE) 200 MG/ML injection Inject 50 mg into the shoulder, thigh, or buttocks one time.      citalopram (CELEXA) 10 MG tablet Take 1 Tablet by mouth every day. 30 Tablet 2    albuterol 108 (90 Base) MCG/ACT Aero Soln inhalation aerosol INHALE 2 PUFFS BY MOUTH EVERY 6 HOURS AS NEEDED FOR SHORTNESS OF BREATH 8.5 Each 0    SYNTHROID 175 MCG Tab TAKE 1 TABLET BY MOUTH EVERY DAY IN THE MORNING ON EMPTY STOMACH FOR 90 DAYS 90 Tablet 1    diclofenac sodium (VOLTAREN) 1 % Gel Apply 2 g topically 4 times a day as needed (lower back pain).      vitamin D2, Ergocalciferol, (DRISDOL) 1.25 MG (18215 UT) Cap capsule Take 1 tablet by mouth every 7 days 12 Capsule 1    montelukast (SINGULAIR) 10 MG Tab Take 1 Tablet by mouth every day. 90 Tablet 3    cetirizine (ZYRTEC) 10 MG Tab Take 1 Tablet by mouth 2 times a day. 180 Tablet 3    hydrOXYzine HCl (ATARAX) 10 MG Tab TAKE 1 TABLET BY MOUTH THREE TIMES A DAY AS NEEDED 270 Tablet 1    famotidine (PEPCID) 20 MG Tab Take 1 Tab by mouth 2 times a day. 60 Tab 11     No current facility-administered medications for this visit.

## 2025-06-05 NOTE — Clinical Note
REFERRAL APPROVAL NOTICE         Sent on June 5, 2025                   Annamaria Babin  [address] [address]  [place] [address]                   Dear Ms. Babin,    After a careful review of the medical information and benefit coverage, [facility] has processed your referral. See below for additional details.    If applicable, you must be actively enrolled with your insurance for coverage of the authorized service. If you have any questions regarding your coverage, please contact your insurance directly.    REFERRAL INFORMATION   Referral #:  [id number]  Referred-To Department    Referred-By Provider:  Dermatology    Sarah Wolf P.A.-C.   [facility]      [address] [place] [id number] [phone] [address]  [place] [id number] [phone]    Referral Start Date:  06/05/2025  Referral End Date:   06/05/2026             SCHEDULING  If you do not already have an appointment, please call [phone] to make an appointment.     MORE INFORMATION  If you do not already have a [employer] account, sign up at: [email or web address]  You can access your medical information, make appointments, see lab results, billing information, and more.  If you have questions regarding this referral, please contact  the [facility] Referrals department at:             [phone]. Monday - Friday 8:00AM - 5:00PM.     Sincerely,    [facility]

## 2025-06-06 ENCOUNTER — RESULTS FOLLOW-UP (OUTPATIENT)
Dept: UROLOGY | Facility: MEDICAL CENTER | Age: 40
End: 2025-06-06

## 2025-06-09 ENCOUNTER — TELEPHONE (OUTPATIENT)
Dept: MEDICAL GROUP | Age: 40
End: 2025-06-09
Payer: MEDICARE

## 2025-06-09 DIAGNOSIS — R92.8 ABNORMAL SCREENING MAMMOGRAM: Primary | ICD-10-CM

## 2025-06-09 NOTE — TELEPHONE ENCOUNTER
Escalation from call center received from patient stating she is to have a whole breast US. I see that you placed the order for diagnostic mammo. I ordered the breast US complete if you would like to approve that.    I did leave a message for the patient to call me back at my direct number. I did let her know I placed the US order for you to sign tomorrow when you are back in clinic.

## 2025-06-10 DIAGNOSIS — R92.30 DENSE BREAST TISSUE: Primary | ICD-10-CM

## 2025-06-10 DIAGNOSIS — F33.0 MILD EPISODE OF RECURRENT MAJOR DEPRESSIVE DISORDER (HCC): ICD-10-CM

## 2025-06-10 NOTE — TELEPHONE ENCOUNTER
" CITALOPRAM HBR 10 MG TABLET  to 90 day fill. Pharmacy states:    \"REQUEST FOR 90 DAYS PRESCRIPTION. DX Code Needed.\"  "

## 2025-06-10 NOTE — PROGRESS NOTES
Dense breast tissue seen on mammo patient requesting sonocine ultrasound.  I ordered it today Clary 10 at 11:40 AM

## 2025-06-11 RX ORDER — CITALOPRAM HYDROBROMIDE 10 MG/1
10 TABLET ORAL DAILY
Qty: 90 TABLET | Refills: 1 | Status: SHIPPED | OUTPATIENT
Start: 2025-06-11

## 2025-06-13 DIAGNOSIS — N30.90 CYSTITIS: Primary | ICD-10-CM

## 2025-06-13 RX ORDER — FLUCONAZOLE 150 MG/1
150 TABLET ORAL DAILY
Qty: 3 TABLET | Refills: 0 | Status: SHIPPED | OUTPATIENT
Start: 2025-06-13 | End: 2025-06-16

## 2025-06-23 ENCOUNTER — TELEPHONE (OUTPATIENT)
Dept: HEALTH INFORMATION MANAGEMENT | Facility: OTHER | Age: 40
End: 2025-06-23
Payer: MEDICARE

## 2025-06-25 ENCOUNTER — OFFICE VISIT (OUTPATIENT)
Dept: CARDIOLOGY | Facility: MEDICAL CENTER | Age: 40
End: 2025-06-25
Attending: PHYSICIAN ASSISTANT
Payer: MEDICARE

## 2025-06-25 VITALS
HEART RATE: 61 BPM | RESPIRATION RATE: 14 BRPM | OXYGEN SATURATION: 99 % | BODY MASS INDEX: 25.99 KG/M2 | WEIGHT: 156 LBS | DIASTOLIC BLOOD PRESSURE: 80 MMHG | SYSTOLIC BLOOD PRESSURE: 116 MMHG | HEIGHT: 65 IN

## 2025-06-25 DIAGNOSIS — E78.2 MIXED HYPERLIPIDEMIA: ICD-10-CM

## 2025-06-25 DIAGNOSIS — R00.2 PALPITATIONS: Primary | ICD-10-CM

## 2025-06-25 DIAGNOSIS — Z82.49 FAMILY HISTORY OF CORONARY ARTERY DISEASE: ICD-10-CM

## 2025-06-25 DIAGNOSIS — Z82.49 FAMILY HISTORY OF HYPERTROPHIC CARDIOMYOPATHY: ICD-10-CM

## 2025-06-25 LAB — EKG IMPRESSION: NORMAL

## 2025-06-25 PROCEDURE — 99204 OFFICE O/P NEW MOD 45 MIN: CPT | Performed by: INTERNAL MEDICINE

## 2025-06-25 PROCEDURE — 3079F DIAST BP 80-89 MM HG: CPT | Performed by: INTERNAL MEDICINE

## 2025-06-25 PROCEDURE — 93010 ELECTROCARDIOGRAM REPORT: CPT | Performed by: INTERNAL MEDICINE

## 2025-06-25 PROCEDURE — 99213 OFFICE O/P EST LOW 20 MIN: CPT | Performed by: INTERNAL MEDICINE

## 2025-06-25 PROCEDURE — 93005 ELECTROCARDIOGRAM TRACING: CPT | Mod: TC | Performed by: INTERNAL MEDICINE

## 2025-06-25 PROCEDURE — 3074F SYST BP LT 130 MM HG: CPT | Performed by: INTERNAL MEDICINE

## 2025-06-25 ASSESSMENT — FIBROSIS 4 INDEX: FIB4 SCORE: 0.77

## 2025-06-25 NOTE — PROGRESS NOTES
"INTERVENTIONAL CARDIOLOGY NEW PATIENT CONSULTATION    PCP: Sarah Wolf P.A.-C.    1. Palpitations    2. Mixed hyperlipidemia    3. Family history of coronary artery disease    4. Family history of hypertrophic cardiomyopathy        Annamaria Babin is clinically well but does have family history of cardiovascular problems which warrant follow-up assessment.  Of particular interest is her sisters sudden death with autopsy demonstrating a 600 g heart-raising the concern for HCM.  I will have her update an echocardiogram.    We discussed ASCVD risk and prevention.  I highlighted the need for regular physical exercise and ensuring appropriate cholesterol control.  We also discussed the potential utility of statin medication as well as coronary artery calcium scanning-I do believe in the absence of significant elevation in LDL cholesterol that repeat calcium scanning would be best delayed 5 to 10 years to minimize cumulative radiation exposure    Follow up: as needed      History: Annamaria Babin is a 40 y.o. female with Hashimoto's thyroid disease, female androgen deficiency, family history of early coronary artery disease-LVH-sudden death in her sister presenting for cardiovascular update.  She had a mammogram and this found calcification in the breast which she was told could be arterial calcification which renewed her interest in assessing for cardiovascular disease.    She feels well.  Has an occasional nondistressing palpitation.    She reports her sister  at age 29 years old with a 600+ gram heart      PE:  /80 (BP Location: Left arm, Patient Position: Sitting, BP Cuff Size: Adult)   Pulse 61   Resp 14   Ht 1.651 m (5' 5\")   Wt 70.8 kg (156 lb)   SpO2 99%   BMI 25.96 kg/m²   GEN: NAD  RESP: CTAB  CVS: RRR, No M/R/G  ABD: Soft, NT/ND  EXT: WWP, no edema    Studies interpreted by me: ECG: Sinus bradycardia at 58 bpm, normal QTc,    The 10-year ASCVD risk score (Dannielle ROMERO, et al., 2019) is: " 0.5%    Studies  Lab Results   Component Value Date/Time    CHOLSTRLTOT 182 10/26/2023 09:40 AM     (H) 10/26/2023 09:40 AM    HDL 45 10/26/2023 09:40 AM    TRIGLYCERIDE 86 10/26/2023 09:40 AM       Lab Results   Component Value Date/Time    SODIUM 142 07/12/2024 01:29 PM    POTASSIUM 3.7 07/12/2024 01:29 PM    CHLORIDE 105 07/12/2024 01:29 PM    CO2 22 07/12/2024 01:29 PM    GLUCOSE 89 07/12/2024 01:29 PM    BUN 11 07/12/2024 01:29 PM    CREATININE 0.61 07/12/2024 01:29 PM      Lab Results   Component Value Date/Time    PROTHROMBTM 12.5 10/18/2012 09:37 AM    INR 0.92 10/18/2012 09:37 AM      Lab Results   Component Value Date/Time    WBC 6.0 07/12/2024 01:29 PM    RBC 5.38 07/12/2024 01:29 PM    HEMOGLOBIN 16.3 (H) 07/12/2024 01:29 PM    HEMATOCRIT 47.5 (H) 07/12/2024 01:29 PM    MCV 88.3 07/12/2024 01:29 PM    MCH 30.3 07/12/2024 01:29 PM    MCHC 34.3 07/12/2024 01:29 PM    MPV 10.2 07/12/2024 01:29 PM    NEUTSPOLYS 57.80 10/26/2023 09:40 AM    LYMPHOCYTES 32.60 10/26/2023 09:40 AM    MONOCYTES 7.60 10/26/2023 09:40 AM    EOSINOPHILS 1.40 10/26/2023 09:40 AM    BASOPHILS 0.40 10/26/2023 09:40 AM    HYPOCHROMIA 1+ 08/24/2022 02:22 PM    ANISOCYTOSIS 1+ 08/31/2022 09:15 AM        Past Medical History[1]  Past Surgical History[2]  Allergies[3]  Encounter Medications[4]  Social History     Socioeconomic History    Marital status:      Spouse name: Not on file    Number of children: Not on file    Years of education: Not on file    Highest education level: Not on file   Occupational History    Not on file   Tobacco Use    Smoking status: Never     Passive exposure: Past    Smokeless tobacco: Never   Vaping Use    Vaping status: Never Used   Substance and Sexual Activity    Alcohol use: Yes     Alcohol/week: 0.0 - 0.6 oz     Comment: Rarely    Drug use: Not Currently     Types: Marijuana     Comment: Edibles    Sexual activity: Yes     Partners: Male     Birth control/protection: Condom     Comment:  , 3 kids and one nephew   Other Topics Concern    Not on file   Social History Narrative    Retired, 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 Used to work for      Social Drivers of Health     Financial Resource Strain: Not on file   Food Insecurity: Not on file   Transportation Needs: Not on file   Physical Activity: Not on file   Stress: Not on file   Social Connections: Not on file   Intimate Partner Violence: Not on file   Housing Stability: Not on file     Family History   Problem Relation Age of Onset    Hypertension Mother     Diabetes Mother     GI Disease Mother         hep c    Other Mother         arterial occlusion    Cancer Mother         unknown. ?lung, metastasis    Heart Disease Father     Heart Failure Father     Hypertension Father     Diabetes Father     Other Maternal Aunt         arterial occlusion       No chief complaint on file.      ROS:   10 point review systems is otherwise negative except as per the HPI         [1]   Past Medical History:  Diagnosis Date    Anemia     iron deficiency    Anesthesia     PONV    Arthritis     bilat sacroliitis/hips    Asthma     prn inhalers    Bronchitis     Daytime sleepiness     Diabetes (HCC)     gestational diabetes    Enlarged liver     Fatty liver disease, nonalcoholic     Gynecological disorder     abnormal uterine bleeding    H/O thyroidectomy 11/14/2013 2010 for goitre.     Hashimoto disease     History of back surgery 11/14/2013    Has had about 5 minimally invasive procedures low back and both hips as SI crest area.      History of strabismus 11/14/2013    Has surgery both eyes for it 1997.      Indigestion     Liver disease     non viral hepatitis    Mastocytosis     Morning headache     Pain     low back    PONV (postoperative nausea and vomiting) 7/12/2024    Psychiatric problem     depression    Renal disorder     chronic kidney disease-blood in urine, thin basement membrance    S/P cholecystectomy 11/14/2013    S/P inguinal hernia  repair 11/14/2013    Left side.     Urinary incontinence     stress incontinence   [2]   Past Surgical History:  Procedure Laterality Date    NV CYSTOURETHROSCOPY N/A 7/12/2024    Procedure: CYSTOSCOPY WITH URETHRAL BULKING AGENT INJECTION;  Surgeon: Jacob Beck M.D.;  Location: SURGERY [facility];  Service: Urology    WOUND IRRIGATION & DEBRIDEMENT Right 8/22/2022    Procedure: IRRIGATION AND DEBRIDEMENT,RIGHT ANKLE WOUND;  Surgeon: Vernon Gaspar M.D.;  Location: SURGERY [facility];  Service: Orthopedics    EXOSTOSIS EXCISION Right 6/24/2022    Procedure: REMOVAL RIGHT FIBULA EXOSTOSIS;  Surgeon: Vernon Gaspar M.D.;  Location: SURGERY [facility];  Service: Orthopedics    DEBRIDEMENT Right 6/24/2022    Procedure: DEBRIDEMENT RIGHT FIBULA;  Surgeon: Vernon Gaspar M.D.;  Location: [facility];  Service: Orthopedics    PELVISCOPY  03/06/2019    Procedure: PELVISCOPY- EUA;  Surgeon: Alesia Krishnamurthy M.D.;  Location: SURGERY SAME DAY [facility];  Service: Gynecology    SALPINGECTOMY Bilateral 03/06/2019    Procedure: SALPINGECTOMY;  Surgeon: Alesia Krishnamurthy M.D.;  Location: SURGERY SAME DAY [facility];  Service: Gynecology    OVARIAN CYSTECTOMY Right 03/06/2019    Procedure: OVARIAN CYSTECTOMY;  Surgeon: Alesia Krishnamurthy M.D.;  Location: SURGERY SAME DAY [facility];  Service: Gynecology    HYSTEROSCOPY WITH MYOSURE N/A 03/06/2019    Procedure: HYSTEROSCOPY WITH MYOSURE-DIAGNOSTIC AND OPERATIVE, EXCISION OF POLYP/FIBROIDS, ENDOMETRIAL CURRETTAGE;  Surgeon: Alesia Krishnamurthy M.D.;  Location: SURGERY SAME DAY [facility];  Service: Gynecology    TUBAL LIGATION  2019    THYROIDECTOMY  2013    CHOLECYSTECTOMY  2011    INGUINAL HERNIA REPAIR  2002    ORIF, ANKLE Right 1997    STRABISMUS REPAIR  1996    TONSILLECTOMY  1995    OTHER      ears   [3]   Allergies  Allergen Reactions    Imuran [Azathioprine Sodium] Unspecified     Liver enzymes rise significantly     "Sulfasalazine Hives    Other Drug Hives     \"Iv contrast\"    Plaquenil [Hydroxychloroquine Sulfate] Hives    Hydrocodone Unspecified     itchy    Other Misc Itching     Tape  Paper tape and tagaderm is ok   [4]   Outpatient Encounter Medications as of 6/25/2025   Medication Sig Dispense Refill    citalopram (CELEXA) 10 MG tablet TAKE 1 TABLET BY MOUTH EVERY DAY 90 Tablet 1    testosterone cypionate (DEPO-TESTOSTERONE) 200 MG/ML injection Inject 50 mg into the shoulder, thigh, or buttocks one time.      albuterol 108 (90 Base) MCG/ACT Aero Soln inhalation aerosol INHALE 2 PUFFS BY MOUTH EVERY 6 HOURS AS NEEDED FOR SHORTNESS OF BREATH 8.5 Each 0    SYNTHROID 175 MCG Tab TAKE 1 TABLET BY MOUTH EVERY DAY IN THE MORNING ON EMPTY STOMACH FOR 90 DAYS 90 Tablet 1    diclofenac sodium (VOLTAREN) 1 % Gel Apply 2 g topically 4 times a day as needed (lower back pain).      vitamin D2, Ergocalciferol, (DRISDOL) 1.25 MG (30908 UT) Cap capsule Take 1 tablet by mouth every 7 days 12 Capsule 1    montelukast (SINGULAIR) 10 MG Tab Take 1 Tablet by mouth every day. 90 Tablet 3    cetirizine (ZYRTEC) 10 MG Tab Take 1 Tablet by mouth 2 times a day. 180 Tablet 3    hydrOXYzine HCl (ATARAX) 10 MG Tab TAKE 1 TABLET BY MOUTH THREE TIMES A DAY AS NEEDED 270 Tablet 1    famotidine (PEPCID) 20 MG Tab Take 1 Tab by mouth 2 times a day. 60 Tab 11     No facility-administered encounter medications on file as of 6/25/2025.     "

## 2025-06-26 ENCOUNTER — HOSPITAL ENCOUNTER (OUTPATIENT)
Dept: LAB | Facility: MEDICAL CENTER | Age: 40
End: 2025-06-26
Attending: INTERNAL MEDICINE
Payer: MEDICARE

## 2025-06-26 LAB
25(OH)D3 SERPL-MCNC: 76 NG/ML (ref 30–100)
ESTRADIOL SERPL-MCNC: 195 PG/ML
FSH SERPL-ACNC: 4.4 MIU/ML
LH SERPL-ACNC: 5.5 IU/L
T3FREE SERPL-MCNC: 3.64 PG/ML (ref 2–4.4)
T4 FREE SERPL-MCNC: 1.87 NG/DL (ref 0.93–1.7)
TSH SERPL-ACNC: 0.01 UIU/ML (ref 0.38–5.33)

## 2025-06-26 PROCEDURE — 84270 ASSAY OF SEX HORMONE GLOBUL: CPT

## 2025-06-26 PROCEDURE — 82670 ASSAY OF TOTAL ESTRADIOL: CPT

## 2025-06-26 PROCEDURE — 84481 FREE ASSAY (FT-3): CPT

## 2025-06-26 PROCEDURE — 84439 ASSAY OF FREE THYROXINE: CPT

## 2025-06-26 PROCEDURE — 84403 ASSAY OF TOTAL TESTOSTERONE: CPT

## 2025-06-26 PROCEDURE — 36415 COLL VENOUS BLD VENIPUNCTURE: CPT

## 2025-06-26 PROCEDURE — 84443 ASSAY THYROID STIM HORMONE: CPT

## 2025-06-26 PROCEDURE — 82306 VITAMIN D 25 HYDROXY: CPT

## 2025-06-26 PROCEDURE — 83002 ASSAY OF GONADOTROPIN (LH): CPT

## 2025-06-26 PROCEDURE — 84402 ASSAY OF FREE TESTOSTERONE: CPT

## 2025-06-26 PROCEDURE — 83001 ASSAY OF GONADOTROPIN (FSH): CPT

## 2025-06-26 NOTE — Clinical Note
[facility]  [address]  [place] [id number]    [id number]    Annamaria Babin  [address] [address]  [place] [address]    June 26, 2025    Member Name: Annamaria Babin   Member Number: [id number]   Reference Number: [id number]   Approved Services: Echos and EKG   Approved Service Dates: 06/25/2025 - 10/24/2025   Requesting Provider: Julio Negron   Requested Provider: [facility]     Dear Annamaria Harrisnaz:    The following medical service(s) requested by Julio Negron have been approved:    Procedure Code Procedure Code Name Requested Quantity Approved Quantity Status   07318 (CPT®) OR ECHO HEART XTHORACIC,COMPLETE W DOPPLER 1 1 Authorized       Approved Quantity means the number of visits approved for medication treatments and/or medical services.    The services should be provided by [facility] no later than 10/24/2025. Please contact the provider listed below with any questions.     Provider Information:  [facility]  [phone]    Your plan benefit may require a deductible, co-payment or coinsurance for these services. This authorization does not guarantee [facility] will pay the claim for services that you receive. Payment by [facility] for these services is subject to the terms of your Evidence of Coverage, your eligibility at the time of service, and confirmation of benefit coverage.    For any questions or additional information, please contact Customer Service:    [facility] Toll Free: [phone]  TTY users dial: [phone]   Call Center Hours:  Oct 1 - Mar 31, Mon - Fri 7 AM to 8 PM PST  Oct 1 - Mar 31, Sat - Sun 8 AM to 8 PM PST  Apr 1 - Sep 30, Mon - Fri 7 AM to 8 PM PST   Office Hours: Mon - Fri 8 AM to 5 PM PST   E-mail: [email or web address]   Website:  [email or web address]      This information is available for free in other languages. Please contact Customer Service at the  phone number above for more information. [facility] complies with applicable Federal civil rights laws and does not discriminate on the basis of race, color, national origin, age, disability or sex.    Sincerely,     Healthcare Utilization Management Department     Cc: [facility]   Julio Negron    Multi-Language Insert  Multi- Services  English: We have free  services to answer any questions you may have about our health or drug plan.  To get an , just call us at [phone].  Someone who speaks English/Language can help you.  This is a free service.  Wolof: Tenemos servicios de intérprete sin costo alguno  para responder cualquier pregunta que pueda tener sobre nuestro plan de shae o medicamentos. Para hablar con un intérprete, por favor llame al [phone]. Alguien que hable español le podrá ayudar. Pao es un servicio gratuito.  Chinese Mandarin: ?????????????????????????????? ???????????????? [phone]????????????????? ?????????  Chinese Cantonese: ?????????????????????????????? ????????????? [phone]???????????????????? ????????  Tagalog:  Ap medina serbisyo sa wadesasaling-kenrick huerta hinggil sa wilbert stroud o panggamot.  Kirby blevins tagasalingholger coleman sa [phone]. Katheryn blevins Tagalog.  Jorje barrera.  Amharic:  Nous proposons tomy services gratuits d'interprétation pour répondre à toutes karina questions relatives à notre régime de santé ou d'assurance-médicaments. Pour accéder au service d'interprétation, il vous suffit de nous appeler au [phone]. Un interlocuteur parlant Français pourra vous aider. Ce service est gratuit.  Palestinian:  Emily aguilera có d?ch v? thông d?ch mi?n phí ð? tr? l?i các câu h?i v? chýõng s?c kh?e và chýõng trình thu?c men. N?u quí v? c?n thông d?ch  viên parish g?i [phone] s? có nhân viên nói ti?ng Vi?t giúp ð? quí v?. Ðây là d?ch v? mi?n phí .  Romansh:  Unser kostenser Dolmetscherservice beantwortet Ihren Fragen zu unserem Gesundheits- und Arzneimittelplan. Unsere Dolmetscher erreichen Sie [phone]. Man wird Ihnen favio auf [facility]. Dieser Service ist [facility].  Pashto:  ??? ?? ?? ?? ?? ??? ?? ??? ?? ???? ?? ?? ???? ???? ????. ?? ???? ????? ?? [phone] ??? ??? ????.  ???? ?? ???? ?? ?? ????. ? ???? ??? ?????.   Greek: Åñëè ó âàñ âîçíèêíóò âîïðîñû îòíîñèòåëüíî ñòðàõîâîãî èëè ìåäèêàìåíòíîãî ïëàíà, âû ìîæåòå âîñïîëüçîâàòüñÿ íàøèìè áåñïëàòíûìè óñëóãàìè ïåðåâîä÷èêîâ. ×òîáû âîñïîëüçîâàòüñÿ óñëóãàìè ïåðåâîä÷èêà, ïîçâîíèòå íàì ïî òåëåôîíó [phone]. Âàì îêàæåò ïîìîùü ñîòðóäíèê, êîòîðûé ãîâîðèò ïî-póññêè. Äàííàÿ óñëóãà áåñïëàòíàÿ.  Setswana: ÅääÇ äÞÏã ÎÏãÇÊ ÇáãÊÑÌã ÇáÝæÑí ÇáãÌÇäíÉ ááÅÌÇÈÉ Úä Ãí ÃÓÆáÉ ÊÊÚáÞ ÈÇáÕÍÉ Ãæ ÌÏæá ÇáÃÏæíÉ áÏíäÇ. ááÍÕæá Úáì ãÊÑÌã ÝæÑí¡ áíÓ Úáíß Óæì ÇáÇÊÕÇá ÈäÇ Úáì [phone] . ÓíÞæã ÔÎÕ ãÇ íÊÍÏË ÇáÚÑÈíÉ ÈãÓÇÚÏÊß. åÐå ÎÏãÉ ãÌÇäíÉ.  Ryan: ????? ????????? ?? ??? ?? ????? ?? ???? ??? ???? ???? ?? ?????? ?? ???? ???? ?? ??? ????? ??? ????? ???????? ?????? ?????? ???. ?? ???????? ??????? ???? ?? ???, ?? ???? [phone] ?? ??? ????. ??? ??????? ?? ?????? ????? ?? ???? ??? ?? ???? ??. ?? ?? ????? ???? ??.   Upper sorbian:  È disponibile un servizio di interpretariato gratuito per rispondere a eventuali domande sul nostro piano sanitario e farmaceutico. Per un interprete, contattare il chidi [phone]. Un nostro incaricato dejon parla Italianovi fornirà l'assistenza necessaria. È un servizio gratuito.  Portugués:  Dispomos de serviços de interpretação gratuitos para responder a qualquer questão que tenha acerca do nosso plano de saúde ou de medicação. Para obter um intérprete, contacte-nos através do número [phone]. Irá encontrar alguém que fale o idioma  Português para o ajudar. Pao serviço é  gratuito.  Faroese Creole:  Nou genyen sèvis entèprèt gratis lei reponn tout kesyon ou ta genyen konsènan plan medikal oswa dwòg nou an.  Lei jwenn yon entèprèt, jis rele nou nan [phone]. Yon moun ki pale Kreyòl kapab yoni w.  Sa a se yon sèvis ki gratis.  Polish:  Umo¿liwiamy bezp³atne skorzystanie z us³ug t³umacza ustnego, który pomo¿e w uzyskaniu odpowiedzi na temat planu zdrowotnego lub dawkowania radhaw. Carmen skorzystaæ z pomocy t³umacza znaj¹cego leslie velasquez¿y zadzwoniæ pod numer [phone]. Ta us³uga jest bezp³atna.  Citizen of [place]: ????? ??????? ????????????????????? ??????????????????????????????????[phone] ???????????????? ? ????????????????? ?????

## 2025-07-01 ENCOUNTER — RESULTS FOLLOW-UP (OUTPATIENT)
Dept: MEDICAL GROUP | Age: 40
End: 2025-07-01

## 2025-07-01 ENCOUNTER — HOSPITAL ENCOUNTER (OUTPATIENT)
Facility: MEDICAL CENTER | Age: 40
End: 2025-07-01
Attending: PHYSICIAN ASSISTANT
Payer: MEDICARE

## 2025-07-01 DIAGNOSIS — R92.8 ABNORMAL MAMMOGRAM: ICD-10-CM

## 2025-07-01 LAB
SHBG SERPL-SCNC: 91 NMOL/L (ref 25–122)
TESTOST FREE SERPL-MCNC: 1.8 PG/ML (ref 1.1–5.8)
TESTOST SERPL-MCNC: 21 NG/DL (ref 9–55)

## 2025-07-01 PROCEDURE — 77065 DX MAMMO INCL CAD UNI: CPT | Mod: LT

## 2025-07-02 ENCOUNTER — PROCEDURE VISIT (OUTPATIENT)
Dept: UROLOGY | Facility: MEDICAL CENTER | Age: 40
End: 2025-07-02
Payer: MEDICARE

## 2025-07-02 ENCOUNTER — HOSPITAL ENCOUNTER (OUTPATIENT)
Dept: RADIOLOGY | Facility: MEDICAL CENTER | Age: 40
End: 2025-07-02
Attending: PHYSICIAN ASSISTANT
Payer: MEDICARE

## 2025-07-02 DIAGNOSIS — R92.30 DENSE BREAST TISSUE: ICD-10-CM

## 2025-07-02 DIAGNOSIS — N30.90 CYSTITIS: Primary | ICD-10-CM

## 2025-07-02 LAB
APPEARANCE UR: CLEAR
BILIRUB UR STRIP-MCNC: NORMAL MG/DL
COLOR UR AUTO: YELLOW
GLUCOSE UR STRIP.AUTO-MCNC: NORMAL MG/DL
KETONES UR STRIP.AUTO-MCNC: NORMAL MG/DL
LEUKOCYTE ESTERASE UR QL STRIP.AUTO: NORMAL
NITRITE UR QL STRIP.AUTO: NORMAL
PH UR STRIP.AUTO: 7.5 [PH] (ref 5–8)
PROT UR QL STRIP: NORMAL MG/DL
RBC UR QL AUTO: NORMAL
SP GR UR STRIP.AUTO: 1.02
UROBILINOGEN UR STRIP-MCNC: 0.2 MG/DL

## 2025-07-02 PROCEDURE — 52000 CYSTOURETHROSCOPY: CPT | Performed by: STUDENT IN AN ORGANIZED HEALTH CARE EDUCATION/TRAINING PROGRAM

## 2025-07-02 PROCEDURE — 81002 URINALYSIS NONAUTO W/O SCOPE: CPT | Performed by: STUDENT IN AN ORGANIZED HEALTH CARE EDUCATION/TRAINING PROGRAM

## 2025-07-02 PROCEDURE — 76641 ULTRASOUND BREAST COMPLETE: CPT

## 2025-07-02 NOTE — PROGRESS NOTES
Subjective  Annamaria Babin is a 40 y.o. female who presents today for cystoscopy to evaluate questionable bladder cyst on ultrasound. She has been having recurrent UTIs and dysuria. She is asymptomatic today. No dysuria or hematuria.    Family History   Problem Relation Age of Onset    Hypertension Mother     Diabetes Mother     GI Disease Mother         hep c    Other Mother         arterial occlusion    Cancer Mother         unknown. ?lung, metastasis    Heart Disease Father     Heart Failure Father     Hypertension Father     Diabetes Father     Other Maternal Aunt         arterial occlusion       Social History     Socioeconomic History    Marital status:      Spouse name: Not on file    Number of children: Not on file    Years of education: Not on file    Highest education level: Not on file   Occupational History    Not on file   Tobacco Use    Smoking status: Never     Passive exposure: Past    Smokeless tobacco: Never   Vaping Use    Vaping status: Never Used   Substance and Sexual Activity    Alcohol use: Yes     Alcohol/week: 0.0 - 0.6 oz     Comment: occ    Drug use: Not Currently     Types: Marijuana     Comment: Edibles    Sexual activity: Yes     Partners: Male     Birth control/protection: Condom     Comment: , 3 kids and one nephew   Other Topics Concern    Not on file   Social History Narrative    Retired, 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 Used to work for      Social Drivers of Health     Financial Resource Strain: Not on file   Food Insecurity: Not on file   Transportation Needs: Not on file   Physical Activity: Not on file   Stress: Not on file   Social Connections: Not on file   Intimate Partner Violence: Not on file   Housing Stability: Not on file       Past Surgical History[1]    Past Medical History[2]    Current Medications[3]    Allergies[4]    Objective  There were no vitals taken for this visit.  Physical Exam  Constitutional:       Appearance: Normal appearance.    HENT:      Head: Normocephalic and atraumatic.   Pulmonary:      Effort: Pulmonary effort is normal.   Skin:     General: Skin is warm and dry.   Neurological:      General: No focal deficit present.      Mental Status: She is alert.   Psychiatric:         Mood and Affect: Mood normal.         Behavior: Behavior normal.         Labs:     POC UA  Lab Results   Component Value Date/Time    POCCOLOR yellow 07/02/2025 01:47 PM    POCCOLOR Yellow 09/19/2017 05:13 PM    POCAPPEAR clear 07/02/2025 01:47 PM    POCAPPEAR Slightly Cloudy (A) 09/19/2017 05:13 PM    POCLEUKEST small 07/02/2025 01:47 PM    POCLEUKEST Negative 09/19/2017 05:13 PM    POCNITRITE neg 07/02/2025 01:47 PM    POCNITRITE Negative 09/19/2017 05:13 PM    POCUROBILIGE 0.2 07/02/2025 01:47 PM    POCPROTEIN neg 07/02/2025 01:47 PM    POCPROTEIN Negative 09/19/2017 05:13 PM    POCURPH 7.5 07/02/2025 01:47 PM    POCURPH 5.5 09/19/2017 05:13 PM    POCBLOOD trace 07/02/2025 01:47 PM    POCBLOOD Trace-intact (A) 09/19/2017 05:13 PM    POCSPGRV 1.020 07/02/2025 01:47 PM    POCSPGRV 1.010 09/19/2017 05:13 PM    POCKETONES neg 07/02/2025 01:47 PM    POCKETONES Negative 09/19/2017 05:13 PM    POCBILIRUBIN neg 07/02/2025 01:47 PM    POCGLUCUA neg 07/02/2025 01:47 PM    POCGLUCUA Negative 09/19/2017 05:13 PM       A1C  Lab Results   Component Value Date/Time    HBA1C 5.0 10/26/2023 0940    AVGLUC 97 10/26/2023 0940       Imaging:   US-RENAL 06/02/2025    Narrative  6/2/2025 4:54 PM    HISTORY/REASON FOR EXAM:  Calculus    TECHNIQUE/EXAM DESCRIPTION:  Renal ultrasound.    COMPARISON:  CT of the abdomen and pelvis, 7/20/2012.    FINDINGS:    The right kidney measures 10.47 cm.  The right kidney is normal in contour and parenchymal echotexture. The corticomedullary differentiation is preserved. The right renal collecting system is not dilated. No hydronephrosis. There are no renal calculi.    The left kidney measures 11.28 cm. The left kidney is normal in contour and  parenchymal echotexture. The corticomedullary differentiation is preserved. The left renal collecting system is not dilated. No hydronephrosis. There are no renal calculi.    The bladder is appropriately filled with urine. No intraluminal mass. There is a cyst involving the inferior wall of the bladder measuring 1.1 x 0.9 x 0.7 cm in diameter. Prevoid bladder volume is 176 mL and postvoid bladder volume is 5 mL.    Impression  1.  Normal sonographic evaluation of the kidneys.  2.  Cyst involving the inferior wall of the bladder measuring 1.1 x 0.9 x 0.7 cm in diameter. The structure resolves after the patient voids.  3.  No postvoid residual.      Procedure    Procedure performed: Cystoscopy      Surgeon: Dr. Jacob Beck    Indications For Procedure: possible bladder cyst    Blood Loss: 0 cc     Anesthesia: Lidocaine jelly     Specimen: none     Findings:     1. Urethra: no lesions or masses, bulkamid evident    2. Bladder: no lesions or masses, no stones, mild trabeculations, no cyst evident. Finding was most likely bulkamid injected in the urethra    3. Bilateral ureteral jets observed with clear efflux bilaterally     Description of Procedure: The patient was prepped and draped in the usual sterile fashion.  A 17Fr flexible cystoscope was advanced along the urethra into the bladder under direct vision.  We performed a thorough cystoscopic evaluation patient's bladder including retroflexion, findings noted above.  We removed the cystoscope under direct vision to evaluate the urethra, findings noted above.  This concluded the procedure, the patient tolerated it well.     Assessment  The patient was instructed to call our office if she develops any signs of infection including increased frequency, urgency, dysuria, fever, or chills.  We discussed the results of the cystoscopy with the patient, all questions and concerns addressed. She was given a prophylactic dose of Keflex today. She will follow up in 6 months.                                                                                                Plan    1. Cystitis  - POCT Urinalysis      RTC 6 months       [1]   Past Surgical History:  Procedure Laterality Date    CO CYSTOURETHROSCOPY N/A 7/12/2024    Procedure: CYSTOSCOPY WITH URETHRAL BULKING AGENT INJECTION;  Surgeon: Jacob Beck M.D.;  Location: SURGERY [facility];  Service: Urology    WOUND IRRIGATION & DEBRIDEMENT Right 8/22/2022    Procedure: IRRIGATION AND DEBRIDEMENT,RIGHT ANKLE WOUND;  Surgeon: Vernon Gaspar M.D.;  Location: SURGERY [facility];  Service: Orthopedics    EXOSTOSIS EXCISION Right 6/24/2022    Procedure: REMOVAL RIGHT FIBULA EXOSTOSIS;  Surgeon: Vernon Gaspar M.D.;  Location: SURGERY [facility];  Service: Orthopedics    DEBRIDEMENT Right 6/24/2022    Procedure: DEBRIDEMENT RIGHT FIBULA;  Surgeon: Vernon Gaspar M.D.;  Location: [facility];  Service: Orthopedics    PELVISCOPY  03/06/2019    Procedure: PELVISCOPY- EUA;  Surgeon: Alesia Krishnamurthy M.D.;  Location: SURGERY SAME DAY [facility];  Service: Gynecology    SALPINGECTOMY Bilateral 03/06/2019    Procedure: SALPINGECTOMY;  Surgeon: Alesia Krishnamurthy M.D.;  Location: SURGERY SAME DAY [facility];  Service: Gynecology    OVARIAN CYSTECTOMY Right 03/06/2019    Procedure: OVARIAN CYSTECTOMY;  Surgeon: Alesia Krishnamurthy M.D.;  Location: SURGERY SAME DAY [facility];  Service: Gynecology    HYSTEROSCOPY WITH MYOSURE N/A 03/06/2019    Procedure: HYSTEROSCOPY WITH MYOSURE-DIAGNOSTIC AND OPERATIVE, EXCISION OF POLYP/FIBROIDS, ENDOMETRIAL CURRETTAGE;  Surgeon: Alesia Krishnamurthy M.D.;  Location: SURGERY SAME DAY [facility];  Service: Gynecology    TUBAL LIGATION  2019    THYROIDECTOMY  2013    CHOLECYSTECTOMY  2011    INGUINAL HERNIA REPAIR  2002    ORIF, ANKLE Right 1997    STRABISMUS REPAIR  1996    TONSILLECTOMY  1995    OTHER      ears   [2]   Past Medical History:  Diagnosis Date    Anemia      iron deficiency    Anesthesia     PONV    Arthritis     bilat sacroliitis/hips    Asthma     prn inhalers    Bronchitis     Daytime sleepiness     Diabetes (HCC)     gestational diabetes    Enlarged liver     Fatty liver disease, nonalcoholic     Gynecological disorder     abnormal uterine bleeding    H/O thyroidectomy 11/14/2013 2010 for goitre.     Hashimoto disease     History of back surgery 11/14/2013    Has had about 5 minimally invasive procedures low back and both hips as SI crest area.      History of strabismus 11/14/2013    Has surgery both eyes for it 1997.      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     Urinary incontinence     stress incontinence   [3]   Current Outpatient Medications   Medication Sig Dispense Refill    citalopram (CELEXA) 10 MG tablet TAKE 1 TABLET BY MOUTH EVERY DAY 90 Tablet 1    testosterone cypionate (DEPO-TESTOSTERONE) 200 MG/ML injection Inject 50 mg into the shoulder, thigh, or buttocks one time.      albuterol 108 (90 Base) MCG/ACT Aero Soln inhalation aerosol INHALE 2 PUFFS BY MOUTH EVERY 6 HOURS AS NEEDED FOR SHORTNESS OF BREATH 8.5 Each 0    SYNTHROID 175 MCG Tab TAKE 1 TABLET BY MOUTH EVERY DAY IN THE MORNING ON EMPTY STOMACH FOR 90 DAYS 90 Tablet 1    diclofenac sodium (VOLTAREN) 1 % Gel Apply 2 g topically 4 times a day as needed (lower back pain).      vitamin D2, Ergocalciferol, (DRISDOL) 1.25 MG (88218 UT) Cap capsule Take 1 tablet by mouth every 7 days 12 Capsule 1    montelukast (SINGULAIR) 10 MG Tab Take 1 Tablet by mouth every day. 90 Tablet 3    cetirizine (ZYRTEC) 10 MG Tab Take 1 Tablet by mouth 2 times a day. 180 Tablet 3  "   hydrOXYzine HCl (ATARAX) 10 MG Tab TAKE 1 TABLET BY MOUTH THREE TIMES A DAY AS NEEDED 270 Tablet 1    famotidine (PEPCID) 20 MG Tab Take 1 Tab by mouth 2 times a day. 60 Tab 11     No current facility-administered medications for this visit.   [4]   Allergies  Allergen Reactions    Imuran [Azathioprine Sodium] Unspecified     Liver enzymes rise significantly    Sulfasalazine Hives    Other Drug Hives     \"Iv contrast\"    Plaquenil [Hydroxychloroquine Sulfate] Hives    Hydrocodone Unspecified     itchy    Other Misc Itching     Tape  Paper tape and tagaderm is ok     "

## 2025-07-03 ENCOUNTER — HOSPITAL ENCOUNTER (OUTPATIENT)
Facility: MEDICAL CENTER | Age: 40
End: 2025-07-03
Attending: RADIOLOGY
Payer: MEDICARE

## 2025-07-03 ENCOUNTER — HOSPITAL ENCOUNTER (OUTPATIENT)
Facility: MEDICAL CENTER | Age: 40
End: 2025-07-03
Attending: PHYSICIAN ASSISTANT
Payer: MEDICARE

## 2025-07-03 ENCOUNTER — OFFICE VISIT (OUTPATIENT)
Dept: MEDICAL GROUP | Age: 40
End: 2025-07-03
Payer: MEDICARE

## 2025-07-03 VITALS
TEMPERATURE: 98.1 F | OXYGEN SATURATION: 98 % | HEART RATE: 69 BPM | BODY MASS INDEX: 26.68 KG/M2 | DIASTOLIC BLOOD PRESSURE: 70 MMHG | HEIGHT: 66 IN | WEIGHT: 166 LBS | SYSTOLIC BLOOD PRESSURE: 110 MMHG

## 2025-07-03 DIAGNOSIS — R92.8 ABNORMAL FINDINGS ON DIAGNOSTIC IMAGING OF BREAST: ICD-10-CM

## 2025-07-03 DIAGNOSIS — G43.009 MIGRAINE WITHOUT AURA AND WITHOUT STATUS MIGRAINOSUS, NOT INTRACTABLE: ICD-10-CM

## 2025-07-03 DIAGNOSIS — R92.8 ABNORMAL SCREENING MAMMOGRAM: Primary | ICD-10-CM

## 2025-07-03 DIAGNOSIS — F41.9 ANXIETY: ICD-10-CM

## 2025-07-03 DIAGNOSIS — R68.89 EPISODES OF DECREASED ATTENTIVENESS: ICD-10-CM

## 2025-07-03 DIAGNOSIS — F33.2 MAJOR DEPRESSIVE DISORDER, RECURRENT SEVERE WITHOUT PSYCHOTIC FEATURES (HCC): ICD-10-CM

## 2025-07-03 LAB — PATHOLOGY CONSULT NOTE: NORMAL

## 2025-07-03 PROCEDURE — 88305 TISSUE EXAM BY PATHOLOGIST: CPT | Performed by: PATHOLOGY

## 2025-07-03 PROCEDURE — A4648 IMPLANTABLE TISSUE MARKER: HCPCS

## 2025-07-03 PROCEDURE — 3074F SYST BP LT 130 MM HG: CPT | Performed by: PHYSICIAN ASSISTANT

## 2025-07-03 PROCEDURE — 3078F DIAST BP <80 MM HG: CPT | Performed by: PHYSICIAN ASSISTANT

## 2025-07-03 PROCEDURE — 88305 TISSUE EXAM BY PATHOLOGIST: CPT | Mod: 26 | Performed by: PATHOLOGY

## 2025-07-03 PROCEDURE — 99214 OFFICE O/P EST MOD 30 MIN: CPT | Performed by: PHYSICIAN ASSISTANT

## 2025-07-03 PROCEDURE — G2211 COMPLEX E/M VISIT ADD ON: HCPCS | Performed by: PHYSICIAN ASSISTANT

## 2025-07-03 RX ORDER — CITALOPRAM HYDROBROMIDE 20 MG/1
20 TABLET ORAL DAILY
Qty: 90 TABLET | Refills: 3 | Status: SHIPPED | OUTPATIENT
Start: 2025-07-03

## 2025-07-03 ASSESSMENT — FIBROSIS 4 INDEX: FIB4 SCORE: 0.77

## 2025-07-03 NOTE — PROGRESS NOTES
Subjective:     History of Present Illness  The patient presents for evaluation of breast calcifications, history of concussion, migraines, and anxiety and depression.    She reports frustration due to the delay in her mammogram results, which she believes is causing unnecessary stress. The radiologist recommended an ultrasound prior to her biopsy. She feels overwhelmed with the situation.    In 1992, she sustained a significant concussion that required medication and led to difficulties in reading and writing. Persistent headaches were experienced from third grade through college, but no follow-up care has been sought since then. She is considering further treatment, as her sister, who also had a major concussion, is under regular care of a neurologist. She recalls having learning and concentration difficulties, with uncertainty about a sequencing learning disability. These issues continue to be a struggle.    Severe headaches occur, for which she takes Imitrex infrequently. Toradol injections have been received in the past. A severe headache was experienced after riding quads all day without adequate hydration, but this has not recurred since 1999. Headaches are managed by resting in a cold, dark room and taking medication. She is curious if these headaches could be related to her brain injury or hormonal changes. Migraines occur about once a month, accompanied by nausea and vomiting, often coinciding with her menstrual cycle. Unusual frontal headaches are reported when stressed or focusing for extended periods.    She is currently on citalopram 10 mg, taken in the morning an hour after her Synthroid. No significant difference is felt on this medication, and several SSRIs have been tried. She is interested in seeing a psychiatrist for an ADHD test, describing feelings of depression, anxiety, and racing thoughts. [facility] to [facility] has been held for a year, but securing an appointment has been  "unsuccessful.    FAMILY HISTORY  Her mother and father have high blood pressure and diabetes.      Current medicines (including changes today)  Current Medications[1]  She  has a past medical history of Anemia, Anesthesia, Arthritis, Asthma, Bronchitis, Daytime sleepiness, Diabetes (HCC), Enlarged liver, Fatty liver disease, nonalcoholic, Gynecological disorder, H/O thyroidectomy (11/14/2013), Hashimoto disease, History of back surgery (11/14/2013), History of strabismus (11/14/2013), Indigestion, Liver disease, Mastocytosis, Morning headache, Pain, PONV (postoperative nausea and vomiting) (7/12/2024), Psychiatric problem, Renal disorder, S/P cholecystectomy (11/14/2013), S/P inguinal hernia repair (11/14/2013), and Urinary incontinence.    ROS   No chest pain, no shortness of breath, no abdominal pain  Positive ROS as per HPI.  All other systems reviewed and are negative.     Objective:     /70   Pulse 69   Temp 36.7 °C (98.1 °F)   Ht 1.67 m (5' 5.75\")   Wt 75.3 kg (166 lb)   SpO2 98%  Body mass index is 27 kg/m².   Physical Exam  General: Patient appears stressed.  Breast: Grouped calcifications noted in the left breast outer quadrant at 6:00 position.  Constitutional: Alert, no distress.  Skin: Warm, dry, good turgor, no rashes in visible areas.  Eye: Equal, round and reactive, conjunctiva clear, lids normal.  ENMT: Lips without lesions, good dentition, oropharynx clear.  Neck: Trachea midline, no masses, no thyromegaly. No cervical or supraclavicular lymphadenopathy  Respiratory: Unlabored respiratory effort, lungs clear to auscultation, no wheezes, no ronchi.  Cardiovascular: Normal S1, S2, no murmur, no edema.  Abdomen: Soft, non-tender, no masses, no hepatosplenomegaly.  Psych: Alert and oriented x3, normal affect and mood.      Results  Imaging   - Screening mammogram of the left breast: Grouped calcifications in the left outer quadrant at 6:00        Assessment and Plan:   The following treatment " plan was discussed    Assessment & Plan  1. Breast calcifications.  - The screening mammogram revealed grouped calcifications in the left outer quadrant at 6:00.  - A diagnostic mammogram was subsequently performed, and a biopsy is scheduled for today.  - The hope is that the calcifications are benign. If the biopsy results indicate malignancy, an urgent referral to Dr. Marti Barry's breast team will be initiated.  - The patient will be informed of the biopsy results early next week.    2. History of concussion.  - She had a major concussion in 1992, which led to learning and concentration difficulties.  - She continues to experience learning disabilities and headaches.  - A referral to neurology will be made to evaluate any residual effects from the concussion and to address her current symptoms.  - The neurologist will assess her brain processing and sequencing issues, as well as her headaches.    3. Migraines.  - She experiences migraines about once a month, often associated with nausea and vomiting, and sometimes related to her menstrual cycle.  - She also gets frontal headaches when stressed or focusing for a long time.  - A referral to the headache clinic will be made to manage her migraines and associated symptoms.  - The neurologist will address both her migraines and any potential residual effects from her concussion.    4. Anxiety and depression.  - She reports symptoms of depression, anxiety, and inattentiveness.  - She is currently on citalopram 10 mg but does not feel any different.  - The dosage of citalopram will be increased to 20 mg daily.  - A referral to behavioral health will be made for further evaluation and management of her symptoms, including the possibility of ADHD.      ORDERS:  1. Abnormal screening mammogram (Primary)      2. Migraine without aura and without status migrainosus, not intractable    - Referral to Neurology    3. Major depressive disorder, recurrent severe without  psychotic features (HCC)    - citalopram (CELEXA) 20 MG Tab; Take 1 Tablet by mouth every day.  Dispense: 90 Tablet; Refill: 3  - Referral to Behavioral Health    4. Anxiety    - citalopram (CELEXA) 20 MG Tab; Take 1 Tablet by mouth every day.  Dispense: 90 Tablet; Refill: 3  - Referral to Behavioral Health    5. Episodes of decreased attentiveness    - Referral to Behavioral Health      () Today's E/M visit is associated with medical care services that serve as the continuing focal point for all needed health care services and/or with medical care services that are part of ongoing care related to a patient's single, serious condition, or a complex condition: This includes furnishing services to patients on an ongoing basis that result in care that is personalized to the patient. The services result in a comprehensive, longitudinal, and continuous relationship with the patient and involve delivery of team-based care that is accessible, coordinated with other practitioners and providers, and integrated with the broader health care landscape.     Anticipatory guidance discussed at length including regular daily exercise with a goal of 150 minutes a week.  Recommendation to eat the Mediterranean diet to decrease cardiovascular risk.  Encouraged avoiding high fructose corn syrup and other simple sugars to reduce risk of obesity and type 2 diabetes.    Follow Up: 4 weeks    Please note that this dictation was created using voice recognition software. I have made every reasonable attempt to correct obvious errors, but I expect that there are errors of grammar and possibly content that I did not discover before finalizing the note.      Attestation      Verbal consent was acquired by the patient to use [employer] ambient listening note generation during this visit Yes                  [1]   Current Outpatient Medications   Medication Sig Dispense Refill    VITAMIN K PO Take  by mouth.      citalopram (CELEXA) 20 MG  Tab Take 1 Tablet by mouth every day. 90 Tablet 3    testosterone cypionate (DEPO-TESTOSTERONE) 200 MG/ML injection Inject 50 mg into the shoulder, thigh, or buttocks one time.      albuterol 108 (90 Base) MCG/ACT Aero Soln inhalation aerosol INHALE 2 PUFFS BY MOUTH EVERY 6 HOURS AS NEEDED FOR SHORTNESS OF BREATH 8.5 Each 0    SYNTHROID 175 MCG Tab TAKE 1 TABLET BY MOUTH EVERY DAY IN THE MORNING ON EMPTY STOMACH FOR 90 DAYS 90 Tablet 1    diclofenac sodium (VOLTAREN) 1 % Gel Apply 2 g topically 4 times a day as needed (lower back pain).      vitamin D2, Ergocalciferol, (DRISDOL) 1.25 MG (39377 UT) Cap capsule Take 1 tablet by mouth every 7 days 12 Capsule 1    montelukast (SINGULAIR) 10 MG Tab Take 1 Tablet by mouth every day. 90 Tablet 3    cetirizine (ZYRTEC) 10 MG Tab Take 1 Tablet by mouth 2 times a day. 180 Tablet 3    hydrOXYzine HCl (ATARAX) 10 MG Tab TAKE 1 TABLET BY MOUTH THREE TIMES A DAY AS NEEDED 270 Tablet 1    famotidine (PEPCID) 20 MG Tab Take 1 Tab by mouth 2 times a day. 60 Tab 11     No current facility-administered medications for this visit.

## 2025-07-03 NOTE — Clinical Note
REFERRAL APPROVAL NOTICE         Sent on July 3, 2025                   Annamaria Babin  [address] [address]  [place] [address]                   Dear Ms. Babin,    After a careful review of the medical information and benefit coverage, [facility] has processed your referral. See below for additional details.    If applicable, you must be actively enrolled with your insurance for coverage of the authorized service. If you have any questions regarding your coverage, please contact your insurance directly.    REFERRAL INFORMATION   Referral #:  [id number]  Referred-To Department    Referred-By Provider:  Behavioral Health    Sarah Wolf P.A.-C.   Behavioral Health Outpatient      [address] [place] [address]  [phone] [address]  [place] [id number] [phone]    Referral Start Date:  07/03/2025  Referral End Date:   07/03/2026             SCHEDULING  If you do not already have an appointment, please call [phone] to make an appointment.     MORE INFORMATION  If you do not already have a [employer] account, sign up at: [email or web address]  You can access your medical information, make appointments, see lab results, billing information, and more.  If you have questions regarding this referral, please contact  the [facility] Referrals department at:             [phone]. Monday - Friday 8:00AM - 5:00PM.     Sincerely,    [facility]

## 2025-07-07 ENCOUNTER — TELEPHONE (OUTPATIENT)
Facility: MEDICAL CENTER | Age: 40
End: 2025-07-07
Payer: MEDICARE

## 2025-07-10 ENCOUNTER — HOSPITAL ENCOUNTER (OUTPATIENT)
Dept: CARDIOLOGY | Facility: MEDICAL CENTER | Age: 40
End: 2025-07-10
Attending: INTERNAL MEDICINE
Payer: MEDICARE

## 2025-07-10 ENCOUNTER — RESULTS FOLLOW-UP (OUTPATIENT)
Dept: CARDIOLOGY | Facility: MEDICAL CENTER | Age: 40
End: 2025-07-10

## 2025-07-10 DIAGNOSIS — R00.2 PALPITATIONS: ICD-10-CM

## 2025-07-10 LAB
LV EJECT FRACT  99904: 66
LV EJECT FRACT MOD 2C 99903: 77.3
LV EJECT FRACT MOD 4C 99902: 54.78
LV EJECT FRACT MOD BP 99901: 66.34

## 2025-07-10 PROCEDURE — 93306 TTE W/DOPPLER COMPLETE: CPT | Mod: 26 | Performed by: INTERNAL MEDICINE

## 2025-07-10 PROCEDURE — 93306 TTE W/DOPPLER COMPLETE: CPT

## (undated) DEVICE — SODIUM CHL IRRIGATION 0.9% 1000ML (12EA/CA)

## (undated) DEVICE — GLOVE BIOGEL PI INDICATOR SZ 6.5 SURGICAL PF LF - (50/BX 4BX/CA)

## (undated) DEVICE — SWAB CULTURE AMIES ESWAB (50EA/PK)

## (undated) DEVICE — LACTATED RINGERS INJ 1000 ML - (14EA/CA 60CA/PF)

## (undated) DEVICE — DEVICE TISSUE REMOVAL LITE MYOSURE

## (undated) DEVICE — KIT  I.V. START (100EA/CA)

## (undated) DEVICE — MEDICINE CUP STERILE 2 OZ - (100/CA)

## (undated) DEVICE — ELECTRODE 850 FOAM ADHESIVE - HYDROGEL RADIOTRNSPRNT (50/PK)

## (undated) DEVICE — SUCTION INSTRUMENT YANKAUER BULBOUS TIP W/O VENT (50EA/CA)

## (undated) DEVICE — SENSOR SPO2 NEO LNCS ADHESIVE (20/BX) SEE USER NOTES

## (undated) DEVICE — DRAPE LARGE 3 QUARTER - (20/CA)

## (undated) DEVICE — PACK CYSTOSCOPY III - (8/CA)

## (undated) DEVICE — GLOVE BIOGEL SZ 6.5 SURGICAL PF LTX (50PR/BX 4BX/CA)

## (undated) DEVICE — PAD PREP 24 X 48 CUFFED - (100/CA)

## (undated) DEVICE — TOWEL STOP TIMEOUT SAFETY FLAG (40EA/CA)

## (undated) DEVICE — STERI STRIP COMPOUND BENZOIN - TINCTURE 0.6ML WITH APPLICATOR (40EA/BX)

## (undated) DEVICE — SLEEVE, VASO, THIGH, MED

## (undated) DEVICE — GLOVE SZ 7 BIOGEL PI MICRO - PF LF (50PR/BX 4BX/CA)

## (undated) DEVICE — CANISTER SUCTION RIGID RED 1500CC (40EA/CA)

## (undated) DEVICE — SENSOR OXIMETER ADULT SPO2 RD SET (20EA/BX)

## (undated) DEVICE — PACK CYSTO III (2EA/CA)

## (undated) DEVICE — COVER FOOT UNIVERSAL DISP. - (25EA/CA)

## (undated) DEVICE — SLEEVE VASO CALF MED - (10PR/CA)

## (undated) DEVICE — TRAY SRGPRP PVP IOD WT PRP - (20/CA)

## (undated) DEVICE — PACK LOWER EXTREMITY - (2/CA)

## (undated) DEVICE — DERMABOND ADVANCED - (12EA/BX)

## (undated) DEVICE — BLADE SAGITTAL SAW 9.4MM X 25.5MM X .4MM FINE TOOTH (1/EA)

## (undated) DEVICE — SUTURE 3-0 MONOCRYL PLUS PS-1 - 27 INCH (36/BX)

## (undated) DEVICE — CANISTER SUCTION 3000ML MECHANICAL FILTER AUTO SHUTOFF MEDI-VAC NONSTERILE LF DISP  (40EA/CA)

## (undated) DEVICE — CONTAINER SPECIMEN BAG OR - STERILE 4 OZ W/LID (100EA/CA)

## (undated) DEVICE — CHLORAPREP 26 ML APPLICATOR - ORANGE TINT(25/CA)

## (undated) DEVICE — TUBE CONNECTING SUCTION - CLEAR PLASTIC STERILE 72 IN (50EA/CA)

## (undated) DEVICE — GLOVE SZ 7.5 BIOGEL PI MICRO - PF LF (50PR/BX)

## (undated) DEVICE — PACK LAPAROSCOPY - (1/CA)

## (undated) DEVICE — DRAPE STRLE REG TOWEL 18X24 - (10/BX 4BX/CA)"

## (undated) DEVICE — HEAD HOLDER JUNIOR/ADULT

## (undated) DEVICE — KIT ANESTHESIA W/CIRCUIT & 3/LT BAG W/FILTER (20EA/CA)

## (undated) DEVICE — TUBING CLEARLINK DUO-VENT - C-FLO (48EA/CA)

## (undated) DEVICE — CONNECTOR HOSE NEPTUNE FOR CYSTO ROOM

## (undated) DEVICE — SPECIMEN PLASTIC CONVERTOR - (10/CA)

## (undated) DEVICE — SPONGE GAUZESTER. 2X2 4-PL - (2/PK 50PK/BX 30BX/CS)

## (undated) DEVICE — SET EXTENSION WITH 2 PORTS (48EA/CA) ***PART #2C8610 IS A SUBSTITUTE*****

## (undated) DEVICE — UTERINE MANIP RUMI 6.7X6 - (5/BX)

## (undated) DEVICE — GOWN WARMING STANDARD FLEX - (30/CA)

## (undated) DEVICE — SUTURE 0 VICRYL PLUS UR-6 - 27 INCH (36/BX)

## (undated) DEVICE — GOWN SURGICAL X-LARGE ULTRA - FILM-REINFORCED (20/CA)

## (undated) DEVICE — HUMID-VENT HEAT AND MOISTURE EXCHANGE- (50/BX)

## (undated) DEVICE — SUTURE 3-0 ETHILON FS-1 - (36/BX) 30 INCH

## (undated) DEVICE — BLADE SURGICAL #15 - (50/BX 3BX/CA)

## (undated) DEVICE — WATER IRRIGATION STERILE 1000ML (12EA/CA)

## (undated) DEVICE — WATER IRRIG. STER 3000 ML - (4/CA)

## (undated) DEVICE — DRESSING TRANSPARENT FILM TEGADERM 4 X 4.75" (50EA/BX)"

## (undated) DEVICE — Device

## (undated) DEVICE — JELLY SURGILUBE STERILE TUBE 4.25 OZ (1/EA)

## (undated) DEVICE — SET SUCTION/IRRIGATION WITH DISPOSABLE TIP (6/CA )PART #0250-070-520 IS A SUB

## (undated) DEVICE — GOWN SURGEONS X-LARGE - DISP. (30/CA)

## (undated) DEVICE — SUTURE 4-0 VICRYL PLUS FS-2 - 27 INCH (36/BX)

## (undated) DEVICE — SUTURE GENERAL

## (undated) DEVICE — PAD SANITARY 11IN MAXI IND WRAPPED  (12EA/PK 24PK/CA)

## (undated) DEVICE — SODIUM CHL. IRRIGATION 0.9% 3000ML (4EA/CA 65CA/PF)

## (undated) DEVICE — GLOVE BIOGEL SZ 7 SURGICAL PF LTX - (50PR/BX 4BX/CA)

## (undated) DEVICE — MANIFOLD NEPTUNE 1 PORT (20/PK)

## (undated) DEVICE — COVER LIGHT HANDLE ALC PLUS DISP (18EA/BX)

## (undated) DEVICE — SUTURE 2-0 VICRYL PLUS CT-1 36 (36PK/BX)"

## (undated) DEVICE — TRAY FOLEY CATHETER STATLOCK 16FR SURESTEP  (10EA/CA)

## (undated) DEVICE — SUTURE 4-0 MONOCRYL PLUS PS-2 - 27 INCH (36/BX)

## (undated) DEVICE — SPONGE GAUZESTER 4 X 4 4PLY - (128PK/CA)

## (undated) DEVICE — SUTURE 0 VICRYL PLUS CT-2 - 27 INCH (36/BX)

## (undated) DEVICE — TUBING INFLOW HYSTEROSCOPY (10EA/CA)

## (undated) DEVICE — DRESSING NON-ADHERING 8 X 3 - (50/BX)

## (undated) DEVICE — APPICATOR HEMOSTAT ARISTA XL - FLEXITIP (10EA/CA)

## (undated) DEVICE — ELECTRODE DUAL RETURN W/ CORD - (50/PK)

## (undated) DEVICE — DRAPESURG STERI-DRAPE LONG - (10/BX 4BX/CA)

## (undated) DEVICE — SET LEADWIRE 5 LEAD BEDSIDE DISPOSABLE ECG (1SET OF 5/EA)

## (undated) DEVICE — BAG DRAINAGE LINGEMAN CYSTO FOR GE/OEC 2600/2800 TABLES (20EA/CA)

## (undated) DEVICE — MASK ANESTHESIA ADULT  - (100/CA)

## (undated) DEVICE — TUBING OUTFLOW HYSTEROSCPY (10EA/BX)

## (undated) DEVICE — NEPTUNE 4 PORT MANIFOLD - (20/PK)

## (undated) DEVICE — LIGASURE 5MM BLUNT TIP LONG - 44CM (6EA/PK)

## (undated) DEVICE — DRAPE UNDER BUTTOCKS FLUID - (20/CA)

## (undated) DEVICE — GLOVE BIOGEL INDICATOR SZ 7SURGICAL PF LTX - (50/BX 4BX/CA)

## (undated) DEVICE — TUBING SETDISPOS HIGH FLOW II - (10/BX)

## (undated) DEVICE — DRAPE FLUOROSCAN C-ARM - 54 X 78 (40EA/CA)

## (undated) DEVICE — PROTECTOR ULNA NERVE - (36PR/CA)

## (undated) DEVICE — HEMOSTAT ARISTA PWD 3 GRAM - (5/CA)

## (undated) DEVICE — SET IRRIGATION CYSTOSCOPY Y-TYPE L81 IN (20EA/CA)

## (undated) DEVICE — CLOSURE SKIN STRIP 1/2 X 4 IN - (STERI STRIP) (50/BX 4BX/CA)

## (undated) DEVICE — WIRE GUIDE SENSOR DUAL FLEX - 5/BX

## (undated) DEVICE — CATHETER IV 20 GA X 1-1/4 ---SURG.& SDS ONLY--- (50EA/BX)

## (undated) DEVICE — TROCAR 5X100 BLADED ADVANCE - FIXATION (6/BX)

## (undated) DEVICE — SYRINGE STERILE 10 ML LL (200/BX)

## (undated) DEVICE — ARMREST CRADLE FOAM - (2PR/PK 12PR/CA)

## (undated) DEVICE — CONTAINER, SPECIMEN, STERILE

## (undated) DEVICE — TROCAR LAPSCP 100MM 12MM NTHRD - (6/BX)

## (undated) DEVICE — BAG SPONGE COUNT 10.25 X 32 - BLUE (250/CA)

## (undated) DEVICE — DRESSING TRANSPARENT FILM TEGADERM 2.375 X 2.75"  (100EA/BX)"